# Patient Record
Sex: MALE | Race: WHITE | NOT HISPANIC OR LATINO | Employment: OTHER | URBAN - METROPOLITAN AREA
[De-identification: names, ages, dates, MRNs, and addresses within clinical notes are randomized per-mention and may not be internally consistent; named-entity substitution may affect disease eponyms.]

---

## 2020-10-17 ENCOUNTER — HOSPITAL ENCOUNTER (EMERGENCY)
Facility: HOSPITAL | Age: 65
Discharge: HOME | End: 2020-10-17
Attending: EMERGENCY MEDICINE | Admitting: EMERGENCY MEDICINE
Payer: MEDICARE

## 2020-10-17 VITALS
BODY MASS INDEX: 29.82 KG/M2 | SYSTOLIC BLOOD PRESSURE: 182 MMHG | HEART RATE: 96 BPM | DIASTOLIC BLOOD PRESSURE: 94 MMHG | HEIGHT: 67 IN | RESPIRATION RATE: 14 BRPM | OXYGEN SATURATION: 99 % | WEIGHT: 190 LBS | TEMPERATURE: 97.3 F

## 2020-10-17 DIAGNOSIS — R45.851 SUICIDAL IDEATION: ICD-10-CM

## 2020-10-17 DIAGNOSIS — Z72.89 ALCOHOL USE: ICD-10-CM

## 2020-10-17 DIAGNOSIS — F10.929 ACUTE ALCOHOL INTOXICATION (HCC): Primary | ICD-10-CM

## 2020-10-17 LAB
ALBUMIN SERPL BCP-MCNC: 3.5 G/DL (ref 3.5–5)
ALP SERPL-CCNC: 76 U/L (ref 46–116)
ALT SERPL W P-5'-P-CCNC: 29 U/L (ref 12–78)
AMPHETAMINES SERPL QL SCN: NEGATIVE
ANION GAP SERPL CALCULATED.3IONS-SCNC: 7 MMOL/L (ref 4–13)
AST SERPL W P-5'-P-CCNC: 26 U/L (ref 5–45)
ATRIAL RATE: 83 BPM
BARBITURATES UR QL: NEGATIVE
BASOPHILS # BLD AUTO: 0.05 THOUSANDS/ΜL (ref 0–0.1)
BASOPHILS NFR BLD AUTO: 1 % (ref 0–1)
BENZODIAZ UR QL: NEGATIVE
BILIRUB SERPL-MCNC: 0.6 MG/DL (ref 0.2–1)
BUN SERPL-MCNC: 12 MG/DL (ref 5–25)
CALCIUM SERPL-MCNC: 8.1 MG/DL (ref 8.3–10.1)
CHLORIDE SERPL-SCNC: 109 MMOL/L (ref 100–108)
CLARITY, POC: CLEAR
CO2 SERPL-SCNC: 27 MMOL/L (ref 21–32)
COCAINE UR QL: NEGATIVE
COLOR, POC: YELLOW
CREAT SERPL-MCNC: 1.11 MG/DL (ref 0.6–1.3)
EOSINOPHIL # BLD AUTO: 0.6 THOUSAND/ΜL (ref 0–0.61)
EOSINOPHIL NFR BLD AUTO: 8 % (ref 0–6)
ERYTHROCYTE [DISTWIDTH] IN BLOOD BY AUTOMATED COUNT: 14.5 % (ref 11.6–15.1)
ETHANOL EXG-MCNC: 0.07 MG/DL
ETHANOL EXG-MCNC: 0.08 MG/DL
ETHANOL EXG-MCNC: 0.11 MG/DL
ETHANOL EXG-MCNC: 0.12 MG/DL
ETHANOL EXG-MCNC: 0.22 MG/DL
ETHANOL EXG-MCNC: 0.24 MG/DL
EXT BILIRUBIN, UA: NEGATIVE
EXT BLOOD URINE: NEGATIVE
EXT GLUCOSE, UA: NEGATIVE
EXT KETONES: NEGATIVE
EXT NITRITE, UA: NEGATIVE
EXT PH, UA: 6
EXT PROTEIN, UA: NEGATIVE
EXT SPECIFIC GRAVITY, UA: 1.01
EXT UROBILINOGEN: 0.2
GFR SERPL CREATININE-BSD FRML MDRD: 70 ML/MIN/1.73SQ M
GLUCOSE SERPL-MCNC: 102 MG/DL (ref 65–140)
HCT VFR BLD AUTO: 34.9 % (ref 36.5–49.3)
HGB BLD-MCNC: 11.7 G/DL (ref 12–17)
IMM GRANULOCYTES # BLD AUTO: 0.01 THOUSAND/UL (ref 0–0.2)
IMM GRANULOCYTES NFR BLD AUTO: 0 % (ref 0–2)
LYMPHOCYTES # BLD AUTO: 4.48 THOUSANDS/ΜL (ref 0.6–4.47)
LYMPHOCYTES NFR BLD AUTO: 57 % (ref 14–44)
MAGNESIUM SERPL-MCNC: 1.8 MG/DL (ref 1.6–2.6)
MCH RBC QN AUTO: 33.1 PG (ref 26.8–34.3)
MCHC RBC AUTO-ENTMCNC: 33.5 G/DL (ref 31.4–37.4)
MCV RBC AUTO: 99 FL (ref 82–98)
METHADONE UR QL: NEGATIVE
MONOCYTES # BLD AUTO: 0.77 THOUSAND/ΜL (ref 0.17–1.22)
MONOCYTES NFR BLD AUTO: 10 % (ref 4–12)
NEUTROPHILS # BLD AUTO: 1.83 THOUSANDS/ΜL (ref 1.85–7.62)
NEUTS SEG NFR BLD AUTO: 24 % (ref 43–75)
NRBC BLD AUTO-RTO: 0 /100 WBCS
OPIATES UR QL SCN: NEGATIVE
OXYCODONE+OXYMORPHONE UR QL SCN: NEGATIVE
P AXIS: 54 DEGREES
PCP UR QL: NEGATIVE
PLATELET # BLD AUTO: 309 THOUSANDS/UL (ref 149–390)
PMV BLD AUTO: 9.8 FL (ref 8.9–12.7)
POTASSIUM SERPL-SCNC: 3.5 MMOL/L (ref 3.5–5.3)
PR INTERVAL: 170 MS
PROT SERPL-MCNC: 7.4 G/DL (ref 6.4–8.2)
QRS AXIS: 12 DEGREES
QRSD INTERVAL: 74 MS
QT INTERVAL: 388 MS
QTC INTERVAL: 455 MS
RBC # BLD AUTO: 3.53 MILLION/UL (ref 3.88–5.62)
SARS-COV-2 RNA RESP QL NAA+PROBE: NEGATIVE
SODIUM SERPL-SCNC: 143 MMOL/L (ref 136–145)
T WAVE AXIS: 48 DEGREES
THC UR QL: NEGATIVE
TSH SERPL DL<=0.05 MIU/L-ACNC: 1.66 UIU/ML (ref 0.36–3.74)
VENTRICULAR RATE: 83 BPM
WBC # BLD AUTO: 7.74 THOUSAND/UL (ref 4.31–10.16)
WBC # BLD EST: NEGATIVE 10*3/UL

## 2020-10-17 PROCEDURE — 85025 COMPLETE CBC W/AUTO DIFF WBC: CPT

## 2020-10-17 PROCEDURE — 87635 SARS-COV-2 COVID-19 AMP PRB: CPT | Performed by: EMERGENCY MEDICINE

## 2020-10-17 PROCEDURE — 93005 ELECTROCARDIOGRAM TRACING: CPT

## 2020-10-17 PROCEDURE — 80307 DRUG TEST PRSMV CHEM ANLYZR: CPT

## 2020-10-17 PROCEDURE — 82075 ASSAY OF BREATH ETHANOL: CPT | Performed by: EMERGENCY MEDICINE

## 2020-10-17 PROCEDURE — 36415 COLL VENOUS BLD VENIPUNCTURE: CPT

## 2020-10-17 PROCEDURE — 99285 EMERGENCY DEPT VISIT HI MDM: CPT | Performed by: EMERGENCY MEDICINE

## 2020-10-17 PROCEDURE — 96361 HYDRATE IV INFUSION ADD-ON: CPT

## 2020-10-17 PROCEDURE — 93010 ELECTROCARDIOGRAM REPORT: CPT | Performed by: INTERNAL MEDICINE

## 2020-10-17 PROCEDURE — 81002 URINALYSIS NONAUTO W/O SCOPE: CPT | Performed by: EMERGENCY MEDICINE

## 2020-10-17 PROCEDURE — 82075 ASSAY OF BREATH ETHANOL: CPT

## 2020-10-17 PROCEDURE — 96360 HYDRATION IV INFUSION INIT: CPT

## 2020-10-17 PROCEDURE — 99285 EMERGENCY DEPT VISIT HI MDM: CPT

## 2020-10-17 PROCEDURE — 80053 COMPREHEN METABOLIC PANEL: CPT

## 2020-10-17 PROCEDURE — 84443 ASSAY THYROID STIM HORMONE: CPT | Performed by: EMERGENCY MEDICINE

## 2020-10-17 PROCEDURE — 83735 ASSAY OF MAGNESIUM: CPT | Performed by: EMERGENCY MEDICINE

## 2020-10-17 RX ORDER — FOLIC ACID 1 MG/1
1 TABLET ORAL DAILY
Status: DISCONTINUED | OUTPATIENT
Start: 2020-10-17 | End: 2020-10-17 | Stop reason: HOSPADM

## 2020-10-17 RX ORDER — THIAMINE MONONITRATE (VIT B1) 100 MG
100 TABLET ORAL DAILY
Status: DISCONTINUED | OUTPATIENT
Start: 2020-10-17 | End: 2020-10-17 | Stop reason: HOSPADM

## 2020-10-17 RX ADMIN — THIAMINE HCL TAB 100 MG 100 MG: 100 TAB at 08:03

## 2020-10-17 RX ADMIN — SODIUM CHLORIDE 1000 ML: 0.9 INJECTION, SOLUTION INTRAVENOUS at 02:30

## 2020-10-17 RX ADMIN — FOLIC ACID 1 MG: 1 TABLET ORAL at 08:03

## 2021-10-29 ENCOUNTER — HOSPITAL ENCOUNTER (EMERGENCY)
Facility: HOSPITAL | Age: 66
Discharge: HOME | End: 2021-10-29
Attending: EMERGENCY MEDICINE
Payer: MEDICARE

## 2021-10-29 VITALS
SYSTOLIC BLOOD PRESSURE: 147 MMHG | OXYGEN SATURATION: 94 % | RESPIRATION RATE: 17 BRPM | TEMPERATURE: 98.2 F | DIASTOLIC BLOOD PRESSURE: 81 MMHG | HEART RATE: 71 BPM

## 2021-10-29 DIAGNOSIS — F10.10 ALCOHOL ABUSE: ICD-10-CM

## 2021-10-29 DIAGNOSIS — K60.2 ANAL FISSURE: Primary | ICD-10-CM

## 2021-10-29 LAB
ETHANOL EXG-MCNC: 0.08 MG/DL
ETHANOL EXG-MCNC: 0.11 MG/DL
ETHANOL EXG-MCNC: NORMAL MG/DL

## 2021-10-29 PROCEDURE — 82272 OCCULT BLD FECES 1-3 TESTS: CPT

## 2021-10-29 PROCEDURE — 82075 ASSAY OF BREATH ETHANOL: CPT | Performed by: EMERGENCY MEDICINE

## 2021-10-29 PROCEDURE — 99285 EMERGENCY DEPT VISIT HI MDM: CPT

## 2021-10-29 PROCEDURE — 99282 EMERGENCY DEPT VISIT SF MDM: CPT | Performed by: EMERGENCY MEDICINE

## 2021-10-29 RX ORDER — BUPROPION HYDROCHLORIDE 150 MG/1
TABLET ORAL
COMMUNITY
End: 2022-07-26

## 2021-10-29 RX ORDER — DOCUSATE SODIUM 100 MG/1
100 CAPSULE, LIQUID FILLED ORAL EVERY 12 HOURS
Qty: 60 CAPSULE | Refills: 0 | Status: SHIPPED | OUTPATIENT
Start: 2021-10-29

## 2021-10-29 RX ORDER — FOLIC ACID 1 MG/1
1 TABLET ORAL DAILY
Status: DISCONTINUED | OUTPATIENT
Start: 2021-10-29 | End: 2021-10-29 | Stop reason: HOSPADM

## 2021-10-29 RX ORDER — LANOLIN ALCOHOL/MO/W.PET/CERES
100 CREAM (GRAM) TOPICAL DAILY
Status: DISCONTINUED | OUTPATIENT
Start: 2021-10-29 | End: 2021-10-29 | Stop reason: HOSPADM

## 2021-10-29 RX ORDER — MULTIVITAMIN/IRON/FOLIC ACID 18MG-0.4MG
1 TABLET ORAL DAILY
Status: DISCONTINUED | OUTPATIENT
Start: 2021-10-29 | End: 2021-10-29 | Stop reason: HOSPADM

## 2021-10-29 RX ORDER — PANTOPRAZOLE SODIUM 40 MG/1
TABLET, DELAYED RELEASE ORAL
COMMUNITY

## 2021-10-29 RX ORDER — CITALOPRAM 20 MG/1
TABLET ORAL
COMMUNITY
End: 2022-07-26

## 2021-10-29 RX ORDER — AMLODIPINE BESYLATE 10 MG/1
TABLET ORAL
COMMUNITY

## 2021-10-29 RX ORDER — AMITRIPTYLINE HYDROCHLORIDE 50 MG/1
TABLET, FILM COATED ORAL
COMMUNITY
End: 2022-07-26

## 2021-10-29 RX ADMIN — THIAMINE HCL TAB 100 MG 100 MG: 100 TAB at 08:04

## 2021-10-29 RX ADMIN — MULTIPLE VITAMINS W/ MINERALS TAB 1 TABLET: TAB ORAL at 08:04

## 2021-10-29 RX ADMIN — FOLIC ACID 1 MG: 1 TABLET ORAL at 08:04

## 2022-04-20 ENCOUNTER — HOSPITAL ENCOUNTER (EMERGENCY)
Facility: HOSPITAL | Age: 67
Discharge: HOME | End: 2022-04-20
Attending: EMERGENCY MEDICINE | Admitting: EMERGENCY MEDICINE
Payer: MEDICARE

## 2022-04-20 ENCOUNTER — APPOINTMENT (EMERGENCY)
Dept: CT IMAGING | Facility: HOSPITAL | Age: 67
End: 2022-04-20
Payer: MEDICARE

## 2022-04-20 VITALS
SYSTOLIC BLOOD PRESSURE: 208 MMHG | TEMPERATURE: 97.5 F | HEART RATE: 97 BPM | OXYGEN SATURATION: 99 % | DIASTOLIC BLOOD PRESSURE: 111 MMHG | RESPIRATION RATE: 18 BRPM

## 2022-04-20 DIAGNOSIS — Z91.14 NONCOMPLIANCE WITH MEDICATIONS: ICD-10-CM

## 2022-04-20 DIAGNOSIS — I10 POORLY-CONTROLLED HYPERTENSION: ICD-10-CM

## 2022-04-20 DIAGNOSIS — R10.9 ABDOMINAL PAIN: Primary | ICD-10-CM

## 2022-04-20 DIAGNOSIS — K29.70 GASTRITIS: ICD-10-CM

## 2022-04-20 LAB
ALBUMIN SERPL BCP-MCNC: 4 G/DL (ref 3.5–5)
ALP SERPL-CCNC: 106 U/L (ref 46–116)
ALT SERPL W P-5'-P-CCNC: 41 U/L (ref 12–78)
ANION GAP SERPL CALCULATED.3IONS-SCNC: 12 MMOL/L (ref 4–13)
AST SERPL W P-5'-P-CCNC: 37 U/L (ref 5–45)
BASOPHILS # BLD AUTO: 0.02 THOUSANDS/ΜL (ref 0–0.1)
BASOPHILS NFR BLD AUTO: 0 % (ref 0–1)
BILIRUB SERPL-MCNC: 2 MG/DL (ref 0.2–1)
BUN SERPL-MCNC: 15 MG/DL (ref 5–25)
CALCIUM SERPL-MCNC: 9 MG/DL (ref 8.3–10.1)
CHLORIDE SERPL-SCNC: 101 MMOL/L (ref 100–108)
CO2 SERPL-SCNC: 23 MMOL/L (ref 21–32)
CREAT SERPL-MCNC: 1.13 MG/DL (ref 0.6–1.3)
EOSINOPHIL # BLD AUTO: 0.14 THOUSAND/ΜL (ref 0–0.61)
EOSINOPHIL NFR BLD AUTO: 3 % (ref 0–6)
ERYTHROCYTE [DISTWIDTH] IN BLOOD BY AUTOMATED COUNT: 13.3 % (ref 11.6–15.1)
GFR SERPL CREATININE-BSD FRML MDRD: 67 ML/MIN/1.73SQ M
GLUCOSE SERPL-MCNC: 107 MG/DL (ref 65–140)
HCT VFR BLD AUTO: 44.9 % (ref 36.5–49.3)
HGB BLD-MCNC: 15.6 G/DL (ref 12–17)
IMM GRANULOCYTES # BLD AUTO: 0.02 THOUSAND/UL (ref 0–0.2)
IMM GRANULOCYTES NFR BLD AUTO: 0 % (ref 0–2)
LIPASE SERPL-CCNC: 75 U/L (ref 73–393)
LYMPHOCYTES # BLD AUTO: 1.59 THOUSANDS/ΜL (ref 0.6–4.47)
LYMPHOCYTES NFR BLD AUTO: 33 % (ref 14–44)
MCH RBC QN AUTO: 32.8 PG (ref 26.8–34.3)
MCHC RBC AUTO-ENTMCNC: 34.7 G/DL (ref 31.4–37.4)
MCV RBC AUTO: 95 FL (ref 82–98)
MONOCYTES # BLD AUTO: 0.37 THOUSAND/ΜL (ref 0.17–1.22)
MONOCYTES NFR BLD AUTO: 8 % (ref 4–12)
NEUTROPHILS # BLD AUTO: 2.75 THOUSANDS/ΜL (ref 1.85–7.62)
NEUTS SEG NFR BLD AUTO: 56 % (ref 43–75)
NRBC BLD AUTO-RTO: 0 /100 WBCS
PLATELET # BLD AUTO: 255 THOUSANDS/UL (ref 149–390)
PMV BLD AUTO: 10 FL (ref 8.9–12.7)
POTASSIUM SERPL-SCNC: 3.6 MMOL/L (ref 3.5–5.3)
PROT SERPL-MCNC: 8 G/DL (ref 6.4–8.2)
RBC # BLD AUTO: 4.75 MILLION/UL (ref 3.88–5.62)
SODIUM SERPL-SCNC: 136 MMOL/L (ref 136–145)
WBC # BLD AUTO: 4.89 THOUSAND/UL (ref 4.31–10.16)

## 2022-04-20 PROCEDURE — 99284 EMERGENCY DEPT VISIT MOD MDM: CPT

## 2022-04-20 PROCEDURE — 74177 CT ABD & PELVIS W/CONTRAST: CPT

## 2022-04-20 PROCEDURE — 83690 ASSAY OF LIPASE: CPT | Performed by: EMERGENCY MEDICINE

## 2022-04-20 PROCEDURE — G1004 CDSM NDSC: HCPCS

## 2022-04-20 PROCEDURE — 36415 COLL VENOUS BLD VENIPUNCTURE: CPT | Performed by: EMERGENCY MEDICINE

## 2022-04-20 PROCEDURE — 96375 TX/PRO/DX INJ NEW DRUG ADDON: CPT

## 2022-04-20 PROCEDURE — 85025 COMPLETE CBC W/AUTO DIFF WBC: CPT | Performed by: EMERGENCY MEDICINE

## 2022-04-20 PROCEDURE — 93005 ELECTROCARDIOGRAM TRACING: CPT

## 2022-04-20 PROCEDURE — 99285 EMERGENCY DEPT VISIT HI MDM: CPT | Performed by: EMERGENCY MEDICINE

## 2022-04-20 PROCEDURE — 96365 THER/PROPH/DIAG IV INF INIT: CPT

## 2022-04-20 PROCEDURE — 80053 COMPREHEN METABOLIC PANEL: CPT | Performed by: EMERGENCY MEDICINE

## 2022-04-20 PROCEDURE — 96366 THER/PROPH/DIAG IV INF ADDON: CPT

## 2022-04-20 PROCEDURE — C9113 INJ PANTOPRAZOLE SODIUM, VIA: HCPCS | Performed by: EMERGENCY MEDICINE

## 2022-04-20 RX ORDER — PANTOPRAZOLE SODIUM 40 MG/1
40 INJECTION, POWDER, FOR SOLUTION INTRAVENOUS ONCE
Status: COMPLETED | OUTPATIENT
Start: 2022-04-20 | End: 2022-04-20

## 2022-04-20 RX ORDER — ONDANSETRON 2 MG/ML
4 INJECTION INTRAMUSCULAR; INTRAVENOUS ONCE
Status: COMPLETED | OUTPATIENT
Start: 2022-04-20 | End: 2022-04-20

## 2022-04-20 RX ORDER — AMLODIPINE BESYLATE 5 MG/1
10 TABLET ORAL ONCE
Status: COMPLETED | OUTPATIENT
Start: 2022-04-20 | End: 2022-04-20

## 2022-04-20 RX ADMIN — IOHEXOL 100 ML: 350 INJECTION, SOLUTION INTRAVENOUS at 16:19

## 2022-04-20 RX ADMIN — PANTOPRAZOLE SODIUM 40 MG: 40 INJECTION, POWDER, FOR SOLUTION INTRAVENOUS at 15:05

## 2022-04-20 RX ADMIN — ONDANSETRON 4 MG: 2 INJECTION INTRAMUSCULAR; INTRAVENOUS at 15:05

## 2022-04-20 RX ADMIN — AMLODIPINE BESYLATE 10 MG: 5 TABLET ORAL at 15:06

## 2022-04-20 RX ADMIN — SODIUM CHLORIDE, SODIUM LACTATE, POTASSIUM CHLORIDE, AND CALCIUM CHLORIDE 1000 ML: .6; .31; .03; .02 INJECTION, SOLUTION INTRAVENOUS at 15:06

## 2022-04-20 NOTE — ED PROVIDER NOTES
History  Chief Complaint   Patient presents with    Abdominal Pain     Pt reports abdominal pain on left side, startred Matti morning  Pt reports vomiting "once or twice" on Matti morning, with no vomiting since then  Pt reports diarrhea since Sunday  Pt reports being an alcoholic, drinking "1/2 of a fifth of gin per day " Pt reports last drink was on Sunday night  Pt reports not eating or drinking since Sunday  Pt reports not taking prescribed medications since Saturday  73year old male brought by EMS for evaluation of dull left upper quadrant abdominal pain which has been constant for the past 4 days  Patient states he has not had anything to eat or drink since symptoms began  He also has not taken any of his prescribed medications including his antihypertensive  Patient reports that he drinks 1/2 of a fifth of gin daily with the last drink just before the onset of pain  He states he has had similar episodes in the past and typically stops eating and drinking with improvement in symptoms  Patient reports history of prior hospitalizations for alcohol withdrawal        History provided by:  Patient  Abdominal Pain  Pain location:  LUQ  Pain quality: aching    Pain radiates to:  Does not radiate  Pain severity:  Moderate  Onset quality:  Sudden  Duration:  4 days  Timing:  Constant  Progression:  Worsening  Chronicity:  Recurrent  Context: alcohol use    Relieved by:  Nothing  Worsened by:  Nothing  Ineffective treatments: not eating or drinking  Associated symptoms: diarrhea and nausea    Associated symptoms: no chest pain, no chills, no cough, no fever and no shortness of breath    Risk factors: alcohol abuse        Prior to Admission Medications   Prescriptions Last Dose Informant Patient Reported? Taking?    amLODIPine (NORVASC) 10 mg tablet   Yes No   amitriptyline (ELAVIL) 50 mg tablet   Yes No   buPROPion (WELLBUTRIN XL) 150 mg 24 hr tablet   Yes No   citalopram (CeleXA) 20 mg tablet   Yes No docusate sodium (COLACE) 100 mg capsule   No No   Sig: Take 1 capsule (100 mg total) by mouth every 12 (twelve) hours   pantoprazole (PROTONIX) 40 mg tablet   Yes No      Facility-Administered Medications: None       Past Medical History:   Diagnosis Date    Alcohol abuse     Hypertension        Past Surgical History:   Procedure Laterality Date    UMBILICAL HERNIA REPAIR         History reviewed  No pertinent family history  I have reviewed and agree with the history as documented  E-Cigarette/Vaping    E-Cigarette Use Never User      E-Cigarette/Vaping Substances    Nicotine No     THC No     CBD No     Flavoring No     Other No     Unknown No      Social History     Tobacco Use    Smoking status: Never Smoker    Smokeless tobacco: Never Used   Vaping Use    Vaping Use: Never used   Substance Use Topics    Alcohol use: Yes    Drug use: Never       Review of Systems   Constitutional: Positive for appetite change  Negative for chills and fever  HENT: Negative for congestion  Respiratory: Negative for cough and shortness of breath  Cardiovascular: Negative for chest pain  Gastrointestinal: Positive for abdominal pain, diarrhea and nausea  All other systems reviewed and are negative  Physical Exam  Physical Exam  Vitals and nursing note reviewed  Constitutional:       General: He is not in acute distress  Appearance: He is well-developed  He is not toxic-appearing or diaphoretic  HENT:      Head: Normocephalic and atraumatic  Right Ear: External ear normal       Left Ear: External ear normal       Nose: Nose normal    Eyes:      General: No scleral icterus  Cardiovascular:      Rate and Rhythm: Normal rate and regular rhythm  Pulses: Normal pulses  Pulmonary:      Effort: Pulmonary effort is normal  No respiratory distress  Abdominal:      General: There is no distension  Tenderness: There is abdominal tenderness in the left upper quadrant  Musculoskeletal:         General: No deformity  Normal range of motion  Skin:     Findings: No rash  Neurological:      General: No focal deficit present  Mental Status: He is alert        Gait: Gait normal    Psychiatric:         Mood and Affect: Mood normal          Vital Signs  ED Triage Vitals   Temperature Pulse Respirations Blood Pressure SpO2   04/20/22 1439 04/20/22 1439 04/20/22 1439 04/20/22 1439 04/20/22 1439   97 5 °F (36 4 °C) 97 18 (!) 221/118 99 %      Temp Source Heart Rate Source Patient Position - Orthostatic VS BP Location FiO2 (%)   04/20/22 1439 04/20/22 1439 04/20/22 1439 04/20/22 1439 --   Tympanic Monitor Sitting Left arm       Pain Score       04/20/22 1443       5           Vitals:    04/20/22 1439 04/20/22 1506 04/20/22 1654   BP: (!) 221/118 (!) 221/118 (!) 208/111   Pulse: 97     Patient Position - Orthostatic VS: Sitting           Visual Acuity      ED Medications  Medications   lactated ringers bolus 1,000 mL (1,000 mL Intravenous New Bag 4/20/22 1506)   ondansetron (ZOFRAN) injection 4 mg (4 mg Intravenous Given 4/20/22 1505)   amLODIPine (NORVASC) tablet 10 mg (10 mg Oral Given 4/20/22 1506)   pantoprazole (PROTONIX) injection 40 mg (40 mg Intravenous Given 4/20/22 1505)   iohexol (OMNIPAQUE) 350 MG/ML injection (SINGLE-DOSE) 100 mL (100 mL Intravenous Given 4/20/22 1619)       Diagnostic Studies  Results Reviewed     Procedure Component Value Units Date/Time    Comprehensive metabolic panel [[id number]]  (Abnormal) Collected: 04/20/22 1507    Lab Status: Final result Specimen: Blood from Arm, Left Updated: 04/20/22 1538     Sodium 136 mmol/L      Potassium 3 6 mmol/L      Chloride 101 mmol/L      CO2 23 mmol/L      ANION GAP 12 mmol/L      BUN 15 mg/dL      Creatinine 1 13 mg/dL      Glucose 107 mg/dL      Calcium 9 0 mg/dL      AST 37 U/L      ALT 41 U/L      Alkaline Phosphatase 106 U/L      Total Protein 8 0 g/dL      Albumin 4 0 g/dL      Total Bilirubin 2 00 mg/dL eGFR 67 ml/min/1 73sq m     Narrative:      [place] guidelines for Chronic Kidney Disease (CKD):     Stage 1 with normal or high GFR (GFR > 90 mL/min/1 73 square meters)    Stage 2 Mild CKD (GFR = 60-89 mL/min/1 73 square meters)    Stage 3A Moderate CKD (GFR = 45-59 mL/min/1 73 square meters)    Stage 3B Moderate CKD (GFR = 30-44 mL/min/1 73 square meters)    Stage 4 Severe CKD (GFR = 15-29 mL/min/1 73 square meters)    Stage 5 End Stage CKD (GFR <15 mL/min/1 73 square meters)  Note: GFR calculation is accurate only with a steady state creatinine    Lipase [[id number]]  (Normal) Collected: 04/20/22 1507    Lab Status: Final result Specimen: Blood from Arm, Left Updated: 04/20/22 1538     Lipase 75 u/L     CBC and differential [[id number]] Collected: 04/20/22 1507    Lab Status: Final result Specimen: Blood from Arm, Left Updated: 04/20/22 1515     WBC 4 89 Thousand/uL      RBC 4 75 Million/uL      Hemoglobin 15 6 g/dL      Hematocrit 44 9 %      MCV 95 fL      MCH 32 8 pg      MCHC 34 7 g/dL      RDW 13 3 %      MPV 10 0 fL      Platelets 998 Thousands/uL      nRBC 0 /100 WBCs      Neutrophils Relative 56 %      Immat GRANS % 0 %      Lymphocytes Relative 33 %      Monocytes Relative 8 %      Eosinophils Relative 3 %      Basophils Relative 0 %      Neutrophils Absolute 2 75 Thousands/µL      Immature Grans Absolute 0 02 Thousand/uL      Lymphocytes Absolute 1 59 Thousands/µL      Monocytes Absolute 0 37 Thousand/µL      Eosinophils Absolute 0 14 Thousand/µL      Basophils Absolute 0 02 Thousands/µL                  CT abdomen pelvis with contrast   Final Result by Jayesh Polo MD (04/20 1646)      No acute abnormality in the abdomen or pelvis        Workstation performed: [id number]                    Procedures  ECG 12 Lead Documentation Only    Date/Time: 4/20/2022 2:51 PM  Performed by: Tracey Mcmahon MD  Authorized by: Tracey Mcmahon MD     Indications / Diagnosis: Abdominal pain  ECG reviewed by me, the ED Provider: yes    Patient location:  ED  Previous ECG:     Previous ECG:  Compared to current    Comparison ECG info:  10/17/20 normal sinus rhythm with twave flattening III, aVL, V2, V3    Similarity:  No change  Interpretation:     Interpretation: non-specific    Rate:     ECG rate:  84    ECG rate assessment: normal    Rhythm:     Rhythm: sinus rhythm    Ectopy:     Ectopy: none    QRS:     QRS axis:  Normal    QRS intervals:  Normal  Conduction:     Conduction: normal    ST segments:     ST segments:  Normal  T waves:     T waves: flattening      Flattening:  V2             ED Course  ED Course as of 04/20/22 1656   Wed Apr 20, 2022   1549 TOTAL BILIRUBIN(!): 2 00  0 6 one year ago                                             MDM  Number of Diagnoses or Management Options  Abdominal pain: new and requires workup  Gastritis: new and requires workup  Noncompliance with medications: new and requires workup  Poorly-controlled hypertension: new and requires workup  Diagnosis management comments: 73year old male presents for evaluation of LUQ abdominal pain associated with alcohol abuse  Labs unremarkable with the exception of isolated elevation of bilirubin  Blood pressure improving after restarting patient's home antihypertensive  CT abd/pelvis unremarkable  Symptoms improved after IV protonix  Patient advised to take his medications, which include pantoprazole, as prescribed  Return precautions provided         Amount and/or Complexity of Data Reviewed  Clinical lab tests: ordered and reviewed  Tests in the radiology section of CPT®: ordered and reviewed    Patient Progress  Patient progress: stable      Disposition  Final diagnoses:   Abdominal pain   Poorly-controlled hypertension   Noncompliance with medications   Gastritis     Time reflects when diagnosis was documented in both MDM as applicable and the Disposition within this note     Time User Action Codes Description Comment    4/20/2022  4:06 PM Neelam Bettencourt Parkinson Add [R10 9] Abdominal pain     4/20/2022  4:06 PM Cynthia Blades Add [I10] Poorly-controlled hypertension     4/20/2022  4:06 PM Cynthia Blades Add [Z91 14] Noncompliance with medications     4/20/2022  4:49 PM Cynthia Blades Add [K29 70] Gastritis       ED Disposition     ED Disposition Condition Date/Time Comment    Discharge Stable Wed Apr 20, 2022  4:54  [facility] discharge to home/self care  Follow-up Information     Follow up With Specialties Details Why Contact Info Additional Information    Brady Long  Schedule an appointment as soon as possible for a visit in 2 days for recheck of your blood pressure [address] Emergency Department Emergency Medicine Go to  If symptoms worsen [address] [place], [address]  [address] [facility] Emergency Department, [address] [facility], [facility] [address]          Patient's Medications   Discharge Prescriptions    No medications on file       No discharge procedures on file      PDMP Review     None          ED Provider  Electronically Signed by           Maribel Chow MD  04/20/22 1470

## 2022-04-22 LAB
ATRIAL RATE: 84 BPM
P AXIS: 65 DEGREES
PR INTERVAL: 166 MS
QRS AXIS: -22 DEGREES
QRSD INTERVAL: 68 MS
QT INTERVAL: 378 MS
QTC INTERVAL: 446 MS
T WAVE AXIS: 64 DEGREES
VENTRICULAR RATE: 84 BPM

## 2022-04-22 PROCEDURE — 93010 ELECTROCARDIOGRAM REPORT: CPT | Performed by: INTERNAL MEDICINE

## 2022-05-20 ENCOUNTER — TELEPHONE (OUTPATIENT)
Dept: GASTROENTEROLOGY | Facility: CLINIC | Age: 67
End: 2022-05-20

## 2022-05-20 NOTE — TELEPHONE ENCOUNTER
Message from [facility] @ [facility] ph: [phone]   - pt seen recently for abd pain in ED  Needs office visit   Thank you

## 2022-06-03 ENCOUNTER — OFFICE VISIT (OUTPATIENT)
Dept: GASTROENTEROLOGY | Facility: CLINIC | Age: 67
End: 2022-06-03
Payer: MEDICARE

## 2022-06-03 VITALS
BODY MASS INDEX: 29.67 KG/M2 | WEIGHT: 195.8 LBS | SYSTOLIC BLOOD PRESSURE: 166 MMHG | DIASTOLIC BLOOD PRESSURE: 110 MMHG | HEIGHT: 68 IN

## 2022-06-03 DIAGNOSIS — E80.6 HYPERBILIRUBINEMIA: ICD-10-CM

## 2022-06-03 DIAGNOSIS — K62.5 RECTAL BLEEDING: Primary | ICD-10-CM

## 2022-06-03 DIAGNOSIS — F10.10 ALCOHOL ABUSE: ICD-10-CM

## 2022-06-03 DIAGNOSIS — Z12.11 SCREENING FOR COLON CANCER: ICD-10-CM

## 2022-06-03 PROCEDURE — 99204 OFFICE O/P NEW MOD 45 MIN: CPT | Performed by: NURSE PRACTITIONER

## 2022-06-03 RX ORDER — POLYETHYLENE GLYCOL 3350, SODIUM SULFATE ANHYDROUS, SODIUM BICARBONATE, SODIUM CHLORIDE, POTASSIUM CHLORIDE 236; 22.74; 6.74; 5.86; 2.97 G/4L; G/4L; G/4L; G/4L; G/4L
4000 POWDER, FOR SOLUTION ORAL ONCE
Qty: 4000 ML | Refills: 0 | OUTPATIENT
Start: 2022-06-03 | End: 2022-07-26

## 2022-06-03 NOTE — PROGRESS NOTES
[id number] - Outpatient Consultation  Piper May 73 y o  male MRN: [id number]  Encounter: [id number]    ASSESSMENT AND PLAN:      1  Rectal bleeding  Patient states he is having increased rectal bleeding over the past 1-2 months  Patient states he is noticing more frequency on the toilet tissue  He denies constipation  States he believes he has a history of hemorrhoids  Hemoglobin 15 6, platelets 236 with recent lab work  Consider hemorrhoid, anal fissure, microscopic colitis, diverticular disease     - Colonoscopy scheduled at [facility])  - polyethylene glycol (Golytely) 4000 mL solution; Take 4,000 mL by mouth once for 1 dose Take 4000 mL by mouth once for 1 dose  Use as directed  Dispense: 4000 mL; Refill: 0    2  Hyperbilirubinemia  Annual blood work with [facility] was normal with the exception of T bili at 2 0  Patient is unaware of any historical elevated liver enzymes  With patient's history of alcohol abuse, will repeat basic lab and right upper quadrant ultrasound  Consider primary biliary cholangitis, hepatic injury, less likely Gilbert's  - Comprehensive metabolic panel  - Fe+TIBC+Matthew  - Direct bilirubin  - US right upper quadrant; Future    3  Alcohol abuse  Patient states he has been drinking a gal of gin for approximately 20-25 years  He did attend and rehab for a month last year and was sober for approximately a month thereafter however states he started back up again  4  Screening for colon cancer  Patient states he has had a colonoscopy in the past but is unsure when and where or results  Requesting medical records from his PCP who he believes has information      - colonoscopy ordered at [facility])    Followup Appointment:  2-3 months after colonoscopy  ______________________________________________________________________    Chief Complaint   Patient presents with    Colonoscopy    Abdominal Pain    Constipation    Rectal Bleeding    Diarrhea HPI:   Anayeli Del Angel is a 73y o  year old male with history of hypertension and alcohol abuse presents with complaint rectal bleeding for approximately the last 2 months  Patient also notes that he consumes approximately a gal of gin a week he states for the past 20-25 years  He denies nausea however states he does have some vomiting with excessive drinking  Denies any acid reflux symptoms while taking pantoprazole 40 mg daily  States he has occasional dull abdominal pain that comes and goes is not dependent on food intake  Suspect sick could be also due to his drinking  Patient states he is having 1-2 soft to normal bowel movements per day  States he does have hematochezia however notes he was told he has a history of hemorrhoids  He does state there has been increased blood on the tissue when wiping in last 1-2 months  He denies any melena  Recent lab work 04/20/2022; lipase normal, CBC normal, liver enzymes with T bili 2 0  Patient states he had a colonoscopy in the past as remember how long ago but does not remember polyps or hemorrhoids    We are requesting medical records from his PCP to get prior medical information    Historical Information   Past Medical History:   Diagnosis Date    Alcohol abuse     Hypertension      Past Surgical History:   Procedure Laterality Date    UMBILICAL HERNIA REPAIR       Social History     Substance and Sexual Activity   Alcohol Use Yes    Comment: a gallon of gin a week     Social History     Substance and Sexual Activity   Drug Use Never     Social History     Tobacco Use   Smoking Status Never Smoker   Smokeless Tobacco Never Used     Family History   Problem Relation Age of Onset    Colon cancer Neg Hx     Colon polyps Neg Hx        Meds/Allergies     Current Outpatient Medications:     amitriptyline (ELAVIL) 50 mg tablet    amLODIPine (NORVASC) 10 mg tablet    buPROPion (WELLBUTRIN XL) 150 mg 24 hr tablet    citalopram (CeleXA) 20 mg tablet   docusate sodium (COLACE) 100 mg capsule    pantoprazole (PROTONIX) 40 mg tablet    polyethylene glycol (Golytely) 4000 mL solution    No Known Allergies    PHYSICAL EXAM:    Blood pressure (!) 166/110, height 5' 8" (1 727 m), weight 88 8 kg (195 lb 12 8 oz)  Body mass index is 29 77 kg/m²  Normal exam    General Appearance: NAD, cooperative, alert  Eyes: Anicteric, PERRLA, EOMI  ENT:  Normocephalic, atraumatic, normal mucosa  Neck:  Supple, symmetrical, trachea midline,   Resp:  Clear to auscultation bilaterally; no rales, rhonchi or wheezing; respirations unlabored   CV:  S1 S2, Regular rate and rhythm; no murmur, rub, or gallop  GI:  Soft, non-tender, non-distended; normal bowel sounds; no masses, no organomegaly   Rectal: Deferred  Musculoskeletal: No cyanosis, clubbing or edema  Normal ROM  Skin:  No jaundice, rashes, or lesions   Heme/Lymph: No palpable cervical lymphadenopathy  Psych: Normal affect, good eye contact  Neuro: No gross deficits, AAOx3    Lab Results:   Lab Results   Component Value Date    WBC 4 89 04/20/2022    HGB 15 6 04/20/2022    HCT 44 9 04/20/2022    MCV 95 04/20/2022     04/20/2022     Lab Results   Component Value Date    K 3 6 04/20/2022     04/20/2022    CO2 23 04/20/2022    BUN 15 04/20/2022    CREATININE 1 13 04/20/2022    CALCIUM 9 0 04/20/2022    AST 37 04/20/2022    ALT 41 04/20/2022    ALKPHOS 106 04/20/2022    EGFR 67 04/20/2022     No results found for: IRON, TIBC, FERRITIN  Lab Results   Component Value Date    LIPASE 75 04/20/2022       Radiology Results:   No results found  REVIEW OF SYSTEMS:    CONSTITUTIONAL: Denies any fever, chills, rigors, and weight loss  HEENT: No earache or tinnitus  Denies hearing loss or visual disturbances  CARDIOVASCULAR: No chest pain or palpitations  RESPIRATORY: Denies any cough, hemoptysis, shortness of breath or dyspnea on exertion  GASTROINTESTINAL: As noted in the History of Present Illness  GENITOURINARY: No problems with urination  Denies any hematuria or dysuria  NEUROLOGIC: No dizziness or vertigo, denies headaches  MUSCULOSKELETAL: Denies any muscle or joint pain  SKIN: Denies skin rashes or itching  ENDOCRINE: Denies excessive thirst  Denies intolerance to heat or cold  PSYCHOSOCIAL: Denies depression or anxiety  Denies any recent memory loss

## 2022-06-03 NOTE — TELEPHONE ENCOUNTER
Scheduled date of colonoscopy (as of today):7/26/2022  Physician performing colonoscopy: Dr Goyo Juárez  Location of colonoscopy:[facility]  Bowel prep reviewed with patient:Colyte  Instructions reviewed with patient by:Nusrat Garcia CMA  Clearances: None

## 2022-07-01 LAB
ALBUMIN SERPL-MCNC: 4.5 G/DL (ref 3.8–4.8)
ALBUMIN/GLOB SERPL: 1.7 {RATIO} (ref 1.2–2.2)
ALP SERPL-CCNC: 92 IU/L (ref 44–121)
ALT SERPL-CCNC: 23 IU/L (ref 0–44)
AST SERPL-CCNC: 23 IU/L (ref 0–40)
BILIRUB DIRECT SERPL-MCNC: 0.39 MG/DL (ref 0–0.4)
BILIRUB SERPL-MCNC: 1.5 MG/DL (ref 0–1.2)
BUN SERPL-MCNC: 12 MG/DL (ref 8–27)
BUN/CREAT SERPL: 10 (ref 10–24)
CALCIUM SERPL-MCNC: 9.5 MG/DL (ref 8.6–10.2)
CHLORIDE SERPL-SCNC: 100 MMOL/L (ref 96–106)
CO2 SERPL-SCNC: 23 MMOL/L (ref 20–29)
CREAT SERPL-MCNC: 1.17 MG/DL (ref 0.76–1.27)
EGFR: 69 ML/MIN/1.73
GLOBULIN SER-MCNC: 2.7 G/DL (ref 1.5–4.5)
GLUCOSE SERPL-MCNC: 111 MG/DL (ref 65–99)
IRON SATN MFR SERPL: 61 % (ref 15–55)
IRON SERPL-MCNC: 248 UG/DL (ref 38–169)
POTASSIUM SERPL-SCNC: 4.7 MMOL/L (ref 3.5–5.2)
PROT SERPL-MCNC: 7.2 G/DL (ref 6–8.5)
SODIUM SERPL-SCNC: 136 MMOL/L (ref 134–144)
TIBC SERPL-MCNC: 409 UG/DL (ref 250–450)
UIBC SERPL-MCNC: 161 UG/DL (ref 111–343)

## 2022-07-22 ENCOUNTER — TELEPHONE (OUTPATIENT)
Dept: GASTROENTEROLOGY | Facility: AMBULATORY SURGERY CENTER | Age: 67
End: 2022-07-22

## 2022-07-26 ENCOUNTER — TELEPHONE (OUTPATIENT)
Dept: GASTROENTEROLOGY | Facility: CLINIC | Age: 67
End: 2022-07-26

## 2022-07-26 ENCOUNTER — ANESTHESIA EVENT (OUTPATIENT)
Dept: GASTROENTEROLOGY | Facility: AMBULATORY SURGERY CENTER | Age: 67
End: 2022-07-26

## 2022-07-26 ENCOUNTER — ANESTHESIA (OUTPATIENT)
Dept: GASTROENTEROLOGY | Facility: AMBULATORY SURGERY CENTER | Age: 67
End: 2022-07-26

## 2022-07-26 ENCOUNTER — HOSPITAL ENCOUNTER (OUTPATIENT)
Dept: GASTROENTEROLOGY | Facility: AMBULATORY SURGERY CENTER | Age: 67
Discharge: HOME | End: 2022-07-26
Payer: MEDICARE

## 2022-07-26 VITALS
HEART RATE: 82 BPM | OXYGEN SATURATION: 98 % | HEIGHT: 68 IN | SYSTOLIC BLOOD PRESSURE: 196 MMHG | DIASTOLIC BLOOD PRESSURE: 91 MMHG | RESPIRATION RATE: 22 BRPM | WEIGHT: 195 LBS | TEMPERATURE: 97.3 F | BODY MASS INDEX: 29.55 KG/M2

## 2022-07-26 DIAGNOSIS — K62.5 RECTAL BLEEDING: ICD-10-CM

## 2022-07-26 PROCEDURE — 45378 DIAGNOSTIC COLONOSCOPY: CPT | Performed by: INTERNAL MEDICINE

## 2022-07-26 RX ORDER — PROPOFOL 10 MG/ML
INJECTION, EMULSION INTRAVENOUS AS NEEDED
Status: DISCONTINUED | OUTPATIENT
Start: 2022-07-26 | End: 2022-07-26

## 2022-07-26 RX ORDER — SODIUM CHLORIDE, SODIUM LACTATE, POTASSIUM CHLORIDE, CALCIUM CHLORIDE 600; 310; 30; 20 MG/100ML; MG/100ML; MG/100ML; MG/100ML
INJECTION, SOLUTION INTRAVENOUS CONTINUOUS PRN
Status: DISCONTINUED | OUTPATIENT
Start: 2022-07-26 | End: 2022-07-26

## 2022-07-26 RX ORDER — SODIUM CHLORIDE, SODIUM LACTATE, POTASSIUM CHLORIDE, CALCIUM CHLORIDE 600; 310; 30; 20 MG/100ML; MG/100ML; MG/100ML; MG/100ML
50 INJECTION, SOLUTION INTRAVENOUS CONTINUOUS
Status: DISCONTINUED | OUTPATIENT
Start: 2022-07-26 | End: 2022-07-30 | Stop reason: HOSPADM

## 2022-07-26 RX ORDER — FLUOXETINE HYDROCHLORIDE 20 MG/1
20 CAPSULE ORAL EVERY MORNING
COMMUNITY
Start: 2022-06-15

## 2022-07-26 RX ORDER — LEVETIRACETAM 500 MG/1
500 TABLET ORAL 2 TIMES DAILY
COMMUNITY
Start: 2022-07-11

## 2022-07-26 RX ADMIN — PROPOFOL 50 MG: 10 INJECTION, EMULSION INTRAVENOUS at 10:05

## 2022-07-26 RX ADMIN — PROPOFOL 100 MG: 10 INJECTION, EMULSION INTRAVENOUS at 09:59

## 2022-07-26 RX ADMIN — PROPOFOL 50 MG: 10 INJECTION, EMULSION INTRAVENOUS at 10:07

## 2022-07-26 RX ADMIN — SODIUM CHLORIDE, SODIUM LACTATE, POTASSIUM CHLORIDE, CALCIUM CHLORIDE 50 ML/HR: 600; 310; 30; 20 INJECTION, SOLUTION INTRAVENOUS at 09:21

## 2022-07-26 RX ADMIN — SODIUM CHLORIDE, SODIUM LACTATE, POTASSIUM CHLORIDE, CALCIUM CHLORIDE: 600; 310; 30; 20 INJECTION, SOLUTION INTRAVENOUS at 09:46

## 2022-07-26 RX ADMIN — PROPOFOL 50 MG: 10 INJECTION, EMULSION INTRAVENOUS at 10:01

## 2022-07-26 RX ADMIN — PROPOFOL 50 MG: 10 INJECTION, EMULSION INTRAVENOUS at 10:10

## 2022-07-26 NOTE — ANESTHESIA PREPROCEDURE EVALUATION
Procedure:  COLONOSCOPY    Relevant Problems   CARDIO   (+) Hypertension      GI/HEPATIC   (+) GERD (gastroesophageal reflux disease)      NEURO/PSYCH   (+) Anxiety      Other   (+) Alcohol abuse        Physical Exam    Airway    Mallampati score: II  TM Distance: >3 FB  Neck ROM: full     Dental   Comment: Extremely poor dentition  Missing, broken teeth with severe decay throughout ,     Cardiovascular      Pulmonary      Other Findings        Anesthesia Plan  ASA Score- 3     Anesthesia Type- IV sedation with anesthesia with ASA Monitors  Additional Monitors:   Airway Plan:     Comment: Pts /101  Looking back at past ER visits, pt is non-compliant with meds and has had /119 range  Instructed we will do the colonoscopy because he is prepped and pt needs to be more compliant with meds and see his PCP  Glynn Duran Plan Factors-    Patient summary reviewed  Patient is not a current smoker  Induction- intravenous  Postoperative Plan-     Informed Consent- Anesthetic plan and risks discussed with patient  I personally reviewed this patient with the CRNA  Discussed and agreed on the Anesthesia Plan with the KATI Duran

## 2022-07-26 NOTE — ANESTHESIA POSTPROCEDURE EVALUATION
Post-Op Assessment Note    CV Status:  Stable  Pain Score: 0    Pain management: adequate     Mental Status:  Arousable and sleepy   Hydration Status:  Stable   PONV Controlled:  Controlled   Airway Patency:  Patent      Post Op Vitals Reviewed: Yes      Staff: CRNA         No complications documented      BP  123/81   Temp 97 6   Pulse 79   Resp 14   SpO2 99

## 2022-07-26 NOTE — TELEPHONE ENCOUNTER
Patient had colonoscopy today for rectal bleeding  No findings other than hemorrhoids  He would like to set up banding, please arrange three banding visits, any physician

## 2022-07-26 NOTE — H&P
History and Physical - [facility]  Venkata Flank 73 y o  male MRN: [id number]    HPI: Venkata Armstrong is a 73y o  year old male who presents for rectal bleeding    REVIEW OF SYSTEMS: Per the HPI, and otherwise unremarkable  Historical Information   Past Medical History:   Diagnosis Date    Alcohol abuse     Anxiety     Cancer ([address] )     kidney    Depression     GERD (gastroesophageal reflux disease)     Hypertension      Past Surgical History:   Procedure Laterality Date    NEPHRECTOMY Left     UMBILICAL HERNIA REPAIR       Social History   Social History     Substance and Sexual Activity   Alcohol Use Yes    Comment: a gallon of gin a week     Social History     Substance and Sexual Activity   Drug Use Never     Social History     Tobacco Use   Smoking Status Never Smoker   Smokeless Tobacco Never Used     Family History   Problem Relation Age of Onset    Colon cancer Neg Hx     Colon polyps Neg Hx        Meds/Allergies       Current Outpatient Medications:     amLODIPine (NORVASC) 10 mg tablet    docusate sodium (COLACE) 100 mg capsule    FLUoxetine (PROzac) 20 mg capsule    levETIRAcetam (KEPPRA) 500 mg tablet    pantoprazole (PROTONIX) 40 mg tablet    polyethylene glycol (Golytely) 4000 mL solution    Current Facility-Administered Medications:     lactated ringers infusion, 50 mL/hr, Intravenous, Continuous, 50 mL/hr at 07/26/22 2522    Facility-Administered Medications Ordered in Other Encounters:     lactated ringers infusion, , Intravenous, Continuous PRN, New Bag at 07/26/22 0946    No Known Allergies    Objective     BP (!) 206/101   Pulse 87   Temp (!) 97 3 °F (36 3 °C) (Temporal)   Resp 20   Ht 5' 8" (1 727 m)   Wt 88 5 kg (195 lb)   SpO2 96%   BMI 29 65 kg/m²     PHYSICAL EXAM    Gen: NAD AAOx3  Head: Normocephalic, Atraumatic  CV: S1S2 RRR no m/r/g  CHEST: Clear b/l no c/r/w  ABD: soft, +BS NT/ND  EXT: no edema    ASSESSMENT/PLAN:  This is a 73 y o  year old male here for colonoscopy, and he is stable and optimized for his procedure

## 2022-08-23 ENCOUNTER — TELEPHONE (OUTPATIENT)
Dept: OTHER | Facility: OTHER | Age: 67
End: 2022-08-23

## 2022-08-23 NOTE — TELEPHONE ENCOUNTER
Patient is calling regarding cancelling an appointment      Date/Time: 8/23/22 2:00pm     Patient was rescheduled: YES [] NO [x]    Patient requesting call back to reschedule: YES [x] NO []

## 2022-09-01 ENCOUNTER — TELEPHONE (OUTPATIENT)
Dept: GASTROENTEROLOGY | Facility: CLINIC | Age: 67
End: 2022-09-01

## 2022-09-01 NOTE — TELEPHONE ENCOUNTER
Left message for patient regarding lab work from previous office visit as well as ultrasound of the abdomen that has not been done per EMR  Requested patient contact the office with any questions

## 2022-09-08 ENCOUNTER — HOSPITAL ENCOUNTER (EMERGENCY)
Facility: HOSPITAL | Age: 67
Discharge: HOME | End: 2022-09-08
Attending: EMERGENCY MEDICINE
Payer: MEDICARE

## 2022-09-08 ENCOUNTER — APPOINTMENT (OUTPATIENT)
Dept: RADIOLOGY | Facility: HOSPITAL | Age: 67
End: 2022-09-08
Attending: EMERGENCY MEDICINE
Payer: MEDICARE

## 2022-09-08 ENCOUNTER — APPOINTMENT (EMERGENCY)
Dept: CT IMAGING | Facility: HOSPITAL | Age: 67
End: 2022-09-08
Attending: EMERGENCY MEDICINE
Payer: MEDICARE

## 2022-09-08 VITALS
HEART RATE: 67 BPM | TEMPERATURE: 97.8 F | SYSTOLIC BLOOD PRESSURE: 122 MMHG | WEIGHT: 195 LBS | BODY MASS INDEX: 29.55 KG/M2 | DIASTOLIC BLOOD PRESSURE: 77 MMHG | OXYGEN SATURATION: 99 % | HEIGHT: 68 IN | RESPIRATION RATE: 16 BRPM

## 2022-09-08 DIAGNOSIS — F10.929 ALCOHOL INTOXICATION (HCC): ICD-10-CM

## 2022-09-08 DIAGNOSIS — S00.83XA FACIAL CONTUSION: Primary | ICD-10-CM

## 2022-09-08 LAB
ALBUMIN SERPL BCP-MCNC: 3.9 G/DL (ref 3.5–5)
ALP SERPL-CCNC: 93 U/L (ref 46–116)
ALT SERPL W P-5'-P-CCNC: 37 U/L (ref 12–78)
ANION GAP SERPL CALCULATED.3IONS-SCNC: 11 MMOL/L (ref 4–13)
AST SERPL W P-5'-P-CCNC: 22 U/L (ref 5–45)
BASOPHILS # BLD AUTO: 0.05 THOUSANDS/ΜL (ref 0–0.1)
BASOPHILS NFR BLD AUTO: 1 % (ref 0–1)
BILIRUB SERPL-MCNC: 0.7 MG/DL (ref 0.2–1)
BUN SERPL-MCNC: 9 MG/DL (ref 5–25)
CALCIUM SERPL-MCNC: 8.4 MG/DL (ref 8.3–10.1)
CHLORIDE SERPL-SCNC: 104 MMOL/L (ref 96–108)
CO2 SERPL-SCNC: 26 MMOL/L (ref 21–32)
CREAT SERPL-MCNC: 1.26 MG/DL (ref 0.6–1.3)
EOSINOPHIL # BLD AUTO: 0.29 THOUSAND/ΜL (ref 0–0.61)
EOSINOPHIL NFR BLD AUTO: 5 % (ref 0–6)
ERYTHROCYTE [DISTWIDTH] IN BLOOD BY AUTOMATED COUNT: 13.3 % (ref 11.6–15.1)
ETHANOL EXG-MCNC: 0.06 MG/DL
ETHANOL EXG-MCNC: 0.17 MG/DL
ETHANOL SERPL-MCNC: 291 MG/DL (ref 0–3)
GFR SERPL CREATININE-BSD FRML MDRD: 59 ML/MIN/1.73SQ M
GLUCOSE SERPL-MCNC: 110 MG/DL (ref 65–140)
HCT VFR BLD AUTO: 42.4 % (ref 36.5–49.3)
HGB BLD-MCNC: 14.8 G/DL (ref 12–17)
IMM GRANULOCYTES # BLD AUTO: 0.02 THOUSAND/UL (ref 0–0.2)
IMM GRANULOCYTES NFR BLD AUTO: 0 % (ref 0–2)
LYMPHOCYTES # BLD AUTO: 3.15 THOUSANDS/ΜL (ref 0.6–4.47)
LYMPHOCYTES NFR BLD AUTO: 56 % (ref 14–44)
MCH RBC QN AUTO: 32.1 PG (ref 26.8–34.3)
MCHC RBC AUTO-ENTMCNC: 34.9 G/DL (ref 31.4–37.4)
MCV RBC AUTO: 92 FL (ref 82–98)
MONOCYTES # BLD AUTO: 0.43 THOUSAND/ΜL (ref 0.17–1.22)
MONOCYTES NFR BLD AUTO: 8 % (ref 4–12)
NEUTROPHILS # BLD AUTO: 1.66 THOUSANDS/ΜL (ref 1.85–7.62)
NEUTS SEG NFR BLD AUTO: 30 % (ref 43–75)
NRBC BLD AUTO-RTO: 0 /100 WBCS
PLATELET # BLD AUTO: 273 THOUSANDS/UL (ref 149–390)
PMV BLD AUTO: 9.9 FL (ref 8.9–12.7)
POTASSIUM SERPL-SCNC: 3.8 MMOL/L (ref 3.5–5.3)
PROT SERPL-MCNC: 7.7 G/DL (ref 6.4–8.4)
RBC # BLD AUTO: 4.61 MILLION/UL (ref 3.88–5.62)
SODIUM SERPL-SCNC: 141 MMOL/L (ref 135–147)
WBC # BLD AUTO: 5.6 THOUSAND/UL (ref 4.31–10.16)

## 2022-09-08 PROCEDURE — 70450 CT HEAD/BRAIN W/O DYE: CPT

## 2022-09-08 PROCEDURE — 72170 X-RAY EXAM OF PELVIS: CPT

## 2022-09-08 PROCEDURE — 82075 ASSAY OF BREATH ETHANOL: CPT | Performed by: EMERGENCY MEDICINE

## 2022-09-08 PROCEDURE — 93005 ELECTROCARDIOGRAM TRACING: CPT

## 2022-09-08 PROCEDURE — 99285 EMERGENCY DEPT VISIT HI MDM: CPT | Performed by: EMERGENCY MEDICINE

## 2022-09-08 PROCEDURE — 82077 ASSAY SPEC XCP UR&BREATH IA: CPT | Performed by: EMERGENCY MEDICINE

## 2022-09-08 PROCEDURE — 80053 COMPREHEN METABOLIC PANEL: CPT | Performed by: EMERGENCY MEDICINE

## 2022-09-08 PROCEDURE — 72125 CT NECK SPINE W/O DYE: CPT

## 2022-09-08 PROCEDURE — 99285 EMERGENCY DEPT VISIT HI MDM: CPT

## 2022-09-08 PROCEDURE — 36415 COLL VENOUS BLD VENIPUNCTURE: CPT | Performed by: EMERGENCY MEDICINE

## 2022-09-08 PROCEDURE — 71045 X-RAY EXAM CHEST 1 VIEW: CPT

## 2022-09-08 PROCEDURE — 85025 COMPLETE CBC W/AUTO DIFF WBC: CPT | Performed by: EMERGENCY MEDICINE

## 2022-09-08 PROCEDURE — 82075 ASSAY OF BREATH ETHANOL: CPT

## 2022-09-08 NOTE — ED PROVIDER NOTES
Emergency Department Trauma Note  Tyson Mahoney 73 y o  male MRN: [id number]  Unit/Bed#: Z1 H2/Z1 H2 Encounter: [id number]      Trauma Alert: Trauma Acuity: Trauma Evaluation  Model of Arrival: Mode of Arrival: BLS via    Trauma Team: Current Providers  Attending Provider: Tao Sin DO  Registered Nurse: Brenda Salinas RN  Registered Nurse: Ryan Hsieh RN  Consultants:     None      History of Present Illness     Chief Complaint:   Chief Complaint   Patient presents with    Fall     Patient presents to the ED s/p mechanical fall while walking outside, states ETOH this morning      HPI:  Tyson Mahoney is a 73 y o  male who presents with  Facial injury after trip and fall patient is intoxicated so trauma evaluation was called  Mechanism:Details of Incident: mechanical fall while walking  Injury Date: 09/08/22 Injury Time: 80       71-year-old male who tripped and fell on the sidewalk striking his face trauma evaluation called secondary to alcohol intoxication  He  Has poor dental hygiene with them that abrasion to the mid upper gumline  History provided by:  Patient  Medical Problem  Location:   facial  Quality:   contusion and abrasion  Severity:  Moderate  Onset quality:  Sudden  Timing:  Constant  Progression:  Unchanged  Chronicity:  New  Context:   trip and fall   Worsened by:   alcohol intoxication    Review of Systems   HENT: Positive for facial swelling  Facial injury   Musculoskeletal: Negative for back pain  Neurological: Negative for syncope  All other systems reviewed and are negative  Historical Information     Immunizations: There is no immunization history on file for this patient      Past Medical History:   Diagnosis Date    Alcohol abuse     Anxiety     Cancer ([facility] [address] )     kidney    Depression     GERD (gastroesophageal reflux disease)     Hypertension        Family History   Problem Relation Age of Onset    Colon cancer Neg Hx     Colon polyps Neg Hx      Past Surgical History:   Procedure Laterality Date    NEPHRECTOMY Left     UMBILICAL HERNIA REPAIR       Social History     Tobacco Use    Smoking status: Never Smoker    Smokeless tobacco: Never Used   Vaping Use    Vaping Use: Never used   Substance Use Topics    Alcohol use: Yes     Comment: a gallon of gin a week    Drug use: Never     E-Cigarette/Vaping    E-Cigarette Use Never User      E-Cigarette/Vaping Substances    Nicotine No     THC No     CBD No     Flavoring No     Other No     Unknown No        Family History: non-contributory    Meds/Allergies   Prior to Admission Medications   Prescriptions Last Dose Informant Patient Reported? Taking? FLUoxetine (PROzac) 20 mg capsule   Yes No   Sig: Take 20 mg by mouth every morning   amLODIPine (NORVASC) 10 mg tablet  Self Yes Yes   docusate sodium (COLACE) 100 mg capsule  Self No No   Sig: Take 1 capsule (100 mg total) by mouth every 12 (twelve) hours   levETIRAcetam (KEPPRA) 500 mg tablet   Yes Yes   Sig: Take 500 mg by mouth 2 (two) times a day   pantoprazole (PROTONIX) 40 mg tablet  Self Yes No      Facility-Administered Medications: None       No Known Allergies    PHYSICAL EXAM    PE limited by:   Alcohol use    Objective   Vitals:   First set: Temperature: 97 5 °F (36 4 °C) (09/08/22 1049)  Pulse: 87 (09/08/22 1047)  Respirations: 18 (09/08/22 1047)  Blood Pressure: 162/81 (09/08/22 1047)  SpO2: 95 % (09/08/22 1047)    Primary Survey:   (A) Airway:  patent  (B) Breathing:  Clear bilateral  (C) Circulation: Pulses:   normal  (D) Disabliity:  GCS Total:  15  (E) Expose:  Completed    Secondary Survey: (Click on Physical Exam tab above)  Physical Exam  Vitals and nursing note reviewed  Constitutional:       General: He is not in acute distress  Appearance: He is not ill-appearing, toxic-appearing or diaphoretic  Comments: Intoxicated but awake and oriented answering appropriately   HENT:      Head: Normocephalic  Right Ear: Tympanic membrane, ear canal and external ear normal       Left Ear: Tympanic membrane, ear canal and external ear normal       Mouth/Throat:      Comments: Mouth contusion and abrasion to the upper gumline with poor dental hygiene and multiple   Corroded teeth  Eyes:      General: No scleral icterus  Right eye: No discharge  Left eye: No discharge  Extraocular Movements: Extraocular movements intact  Comments: Pupils 3-4 mm and sluggish bilateral   Neck:      Comments: Cervical collar in place  Cardiovascular:      Rate and Rhythm: Normal rate and regular rhythm  Pulses: Normal pulses  Heart sounds: No murmur heard  No friction rub  No gallop  Pulmonary:      Effort: Pulmonary effort is normal  No respiratory distress  Breath sounds: No stridor  No wheezing, rhonchi or rales  Abdominal:      General: There is no distension  Palpations: Abdomen is soft  Tenderness: There is no abdominal tenderness  There is no guarding or rebound  Musculoskeletal:         General: No swelling, tenderness, deformity or signs of injury  Normal range of motion  Right lower leg: No edema  Left lower leg: No edema  Skin:     General: Skin is warm and dry  Coloration: Skin is not jaundiced  Findings: No bruising, erythema or rash  Neurological:      General: No focal deficit present  Mental Status: He is oriented to person, place, and time  Cranial Nerves: No cranial nerve deficit  Sensory: No sensory deficit  Coordination: Coordination abnormal    Psychiatric:         Behavior: Behavior normal          Thought Content: Thought content normal          Cervical spine cleared by clinical criteria?  No (imaging required)      Invasive Devices  Report    Peripheral Intravenous Line  Duration           Peripheral IV 09/08/22 Right Antecubital <1 day                Lab Results:   Results Reviewed     Procedure Component Value Units Date/Time    POCT alcohol breath test [[id number]]  (Normal) Resulted: 09/08/22 1551    Lab Status: Final result Updated: 09/08/22 1551     EXTBreath Alcohol 0 17    Comprehensive metabolic panel [[id number]] Collected: 09/08/22 1100    Lab Status: Final result Specimen: Blood from Arm, Right Updated: 09/08/22 1126     Sodium 141 mmol/L      Potassium 3 8 mmol/L      Chloride 104 mmol/L      CO2 26 mmol/L      ANION GAP 11 mmol/L      BUN 9 mg/dL      Creatinine 1 26 mg/dL      Glucose 110 mg/dL      Calcium 8 4 mg/dL      AST 22 U/L      ALT 37 U/L      Alkaline Phosphatase 93 U/L      Total Protein 7 7 g/dL      Albumin 3 9 g/dL      Total Bilirubin 0 70 mg/dL      eGFR 59 ml/min/1 73sq m     Narrative:      [facility] guidelines for Chronic Kidney Disease (CKD):     Stage 1 with normal or high GFR (GFR > 90 mL/min/1 73 square meters)    Stage 2 Mild CKD (GFR = 60-89 mL/min/1 73 square meters)    Stage 3A Moderate CKD (GFR = 45-59 mL/min/1 73 square meters)    Stage 3B Moderate CKD (GFR = 30-44 mL/min/1 73 square meters)    Stage 4 Severe CKD (GFR = 15-29 mL/min/1 73 square meters)    Stage 5 End Stage CKD (GFR <15 mL/min/1 73 square meters)  Note: GFR calculation is accurate only with a steady state creatinine    Ethanol [[id number]]  (Abnormal) Collected: 09/08/22 1100    Lab Status: Final result Specimen: Blood from Arm, Right Updated: 09/08/22 1121     Ethanol Lvl 291 mg/dL     CBC and differential [[id number]]  (Abnormal) Collected: 09/08/22 1100    Lab Status: Final result Specimen: Blood from Arm, Right Updated: 09/08/22 1109     WBC 5 60 Thousand/uL      RBC 4 61 Million/uL      Hemoglobin 14 8 g/dL      Hematocrit 42 4 %      MCV 92 fL      MCH 32 1 pg      MCHC 34 9 g/dL      RDW 13 3 %      MPV 9 9 fL      Platelets 262 Thousands/uL      nRBC 0 /100 WBCs      Neutrophils Relative 30 %      Immat GRANS % 0 %      Lymphocytes Relative 56 %      Monocytes Relative 8 % Eosinophils Relative 5 %      Basophils Relative 1 %      Neutrophils Absolute 1 66 Thousands/µL      Immature Grans Absolute 0 02 Thousand/uL      Lymphocytes Absolute 3 15 Thousands/µL      Monocytes Absolute 0 43 Thousand/µL      Eosinophils Absolute 0 29 Thousand/µL      Basophils Absolute 0 05 Thousands/µL                  Imaging Studies:   Direct to CT: Yes  TRAUMA - CT head wo contrast   Final Result by Lois West MD (09/08 1152)      No acute intracranial CT abnormality  Workstation performed: [id number]         TRAUMA - CT spine cervical wo contrast   Final Result by Lois West MD (09/08 1158)      1  No acute cervical spine fracture or traumatic malalignment  2   Degenerative change as above  Workstation performed: [id number]         XR Trauma chest portable   Final Result by Araceli Stewart MD (09/08 1116)      No acute cardiopulmonary disease  Workstation performed: [id number]         XR Trauma pelvis ap only 1 or 2 vw   Final Result by Araceli Stewart MD (09/08 1117)      No acute osseous abnormality        Workstation performed: [id number]               Procedures  ECG 12 Lead Documentation Only    Date/Time: 9/8/2022 11:03 AM  Performed by: Wilmar Eli DO  Authorized by: Wilmar Eli DO     ECG reviewed by me, the ED Provider: yes    Patient location:  ED  Rate:     ECG rate:  79  Rhythm:     Rhythm: sinus rhythm    Conduction:     Conduction: normal    ST segments:     ST segments:  Non-specific             ED Course  ED Course as of 09/08/22 1630   Thu Sep 08, 2022   1249  Patient cleared medically other than alcohol intoxication he states that he lives alone and does not have anybody to pick him up will need to observe until his level comes down           MDM  Number of Diagnoses or Management Options  Diagnosis management comments:  Trip and fall while intoxicated trauma evaluation called workup in progress including lab work and x-rays       Amount and/or Complexity of Data Reviewed  Clinical lab tests: ordered  Tests in the radiology section of CPT®: ordered            Disposition  [facility] Transfer: No  Final diagnoses:   Facial contusion   Alcohol intoxication ([facility] [address] )     Time reflects when diagnosis was documented in both MDM as applicable and the Disposition within this note     Time User Action Codes Description Comment    9/8/2022 11:43 AM B[place]ide [place] Add [S00 83XA] Facial contusion     9/8/2022 11:43 AM Lily Columbus Add [F10 929] Alcohol intoxication [facility])       ED Disposition     None      Follow-up Information     Follow up With Specialties Details Why Contact Info Additional Information    Chely Almanza MD Internal Medicine   [address] Emergency Department Emergency Medicine  As needed, If symptoms worsen [address] [place], [address]  [address] [facility] Emergency Department, [address] [facility], [facility], [facility]        Patient's Medications   Discharge Prescriptions    No medications on file     No discharge procedures on file      PDMP Review     None          ED Provider  Electronically Signed by         Maxime Saenz DO  09/08/22 1205

## 2022-09-09 LAB
ATRIAL RATE: 79 BPM
P AXIS: 62 DEGREES
PR INTERVAL: 170 MS
QRS AXIS: -33 DEGREES
QRSD INTERVAL: 68 MS
QT INTERVAL: 404 MS
QTC INTERVAL: 463 MS
T WAVE AXIS: 39 DEGREES
VENTRICULAR RATE: 79 BPM

## 2022-09-09 PROCEDURE — 93010 ELECTROCARDIOGRAM REPORT: CPT | Performed by: INTERNAL MEDICINE

## 2022-09-15 ENCOUNTER — TELEPHONE (OUTPATIENT)
Dept: GASTROENTEROLOGY | Facility: CLINIC | Age: 67
End: 2022-09-15

## 2022-09-15 NOTE — TELEPHONE ENCOUNTER
Called pt today to check on labs/US ordered in June  Reminded him of appt 9/26/2022 at 9:30 am   He states he has to arrange his "bus" for the appointment

## 2022-09-26 ENCOUNTER — TELEPHONE (OUTPATIENT)
Dept: GASTROENTEROLOGY | Facility: CLINIC | Age: 67
End: 2022-09-26

## 2022-10-06 ENCOUNTER — HOSPITAL ENCOUNTER (EMERGENCY)
Facility: HOSPITAL | Age: 67
Discharge: HOME | End: 2022-10-06
Attending: EMERGENCY MEDICINE
Payer: MEDICARE

## 2022-10-06 VITALS
HEIGHT: 68 IN | SYSTOLIC BLOOD PRESSURE: 114 MMHG | RESPIRATION RATE: 17 BRPM | TEMPERATURE: 97.9 F | DIASTOLIC BLOOD PRESSURE: 64 MMHG | HEART RATE: 74 BPM | BODY MASS INDEX: 28.64 KG/M2 | OXYGEN SATURATION: 94 % | WEIGHT: 189 LBS

## 2022-10-06 DIAGNOSIS — F10.929 ALCOHOL INTOXICATION (HCC): Primary | ICD-10-CM

## 2022-10-06 DIAGNOSIS — R45.851 SUICIDAL IDEATION: ICD-10-CM

## 2022-10-06 LAB
ALBUMIN SERPL BCP-MCNC: 3.6 G/DL (ref 3.5–5)
ALP SERPL-CCNC: 105 U/L (ref 46–116)
ALT SERPL W P-5'-P-CCNC: 25 U/L (ref 12–78)
AMPHETAMINES SERPL QL SCN: NEGATIVE
ANION GAP SERPL CALCULATED.3IONS-SCNC: 12 MMOL/L (ref 4–13)
APTT PPP: 26 SECONDS (ref 23–37)
AST SERPL W P-5'-P-CCNC: 32 U/L (ref 5–45)
BARBITURATES UR QL: NEGATIVE
BASOPHILS # BLD AUTO: 0.05 THOUSANDS/ΜL (ref 0–0.1)
BASOPHILS NFR BLD AUTO: 1 % (ref 0–1)
BENZODIAZ UR QL: NEGATIVE
BILIRUB SERPL-MCNC: 1.2 MG/DL (ref 0.2–1)
BILIRUB UR QL STRIP: NEGATIVE
BUN SERPL-MCNC: 9 MG/DL (ref 5–25)
CALCIUM SERPL-MCNC: 8.6 MG/DL (ref 8.3–10.1)
CHLORIDE SERPL-SCNC: 95 MMOL/L (ref 96–108)
CLARITY UR: CLEAR
CO2 SERPL-SCNC: 24 MMOL/L (ref 21–32)
COCAINE UR QL: NEGATIVE
COLOR UR: ABNORMAL
CREAT SERPL-MCNC: 1.22 MG/DL (ref 0.6–1.3)
EOSINOPHIL # BLD AUTO: 0.26 THOUSAND/ΜL (ref 0–0.61)
EOSINOPHIL NFR BLD AUTO: 4 % (ref 0–6)
ERYTHROCYTE [DISTWIDTH] IN BLOOD BY AUTOMATED COUNT: 13.4 % (ref 11.6–15.1)
ETHANOL EXG-MCNC: 0.17 MG/DL
ETHANOL EXG-MCNC: 0.22 MG/DL
FLUAV RNA RESP QL NAA+PROBE: NEGATIVE
FLUBV RNA RESP QL NAA+PROBE: NEGATIVE
GFR SERPL CREATININE-BSD FRML MDRD: 61 ML/MIN/1.73SQ M
GLUCOSE SERPL-MCNC: 96 MG/DL (ref 65–140)
GLUCOSE UR STRIP-MCNC: NEGATIVE MG/DL
HCT VFR BLD AUTO: 40 % (ref 36.5–49.3)
HGB BLD-MCNC: 14.1 G/DL (ref 12–17)
HGB UR QL STRIP.AUTO: NEGATIVE
IMM GRANULOCYTES # BLD AUTO: 0.01 THOUSAND/UL (ref 0–0.2)
IMM GRANULOCYTES NFR BLD AUTO: 0 % (ref 0–2)
INR PPP: 0.98 (ref 0.84–1.19)
KETONES UR STRIP-MCNC: NEGATIVE MG/DL
LEUKOCYTE ESTERASE UR QL STRIP: NEGATIVE
LYMPHOCYTES # BLD AUTO: 3.33 THOUSANDS/ΜL (ref 0.6–4.47)
LYMPHOCYTES NFR BLD AUTO: 43 % (ref 14–44)
MAGNESIUM SERPL-MCNC: 2 MG/DL (ref 1.6–2.6)
MCH RBC QN AUTO: 31.8 PG (ref 26.8–34.3)
MCHC RBC AUTO-ENTMCNC: 35.3 G/DL (ref 31.4–37.4)
MCV RBC AUTO: 90 FL (ref 82–98)
METHADONE UR QL: NEGATIVE
MONOCYTES # BLD AUTO: 0.64 THOUSAND/ΜL (ref 0.17–1.22)
MONOCYTES NFR BLD AUTO: 9 % (ref 4–12)
NEUTROPHILS # BLD AUTO: 3.23 THOUSANDS/ΜL (ref 1.85–7.62)
NEUTS SEG NFR BLD AUTO: 43 % (ref 43–75)
NITRITE UR QL STRIP: NEGATIVE
NRBC BLD AUTO-RTO: 0 /100 WBCS
OPIATES UR QL SCN: NEGATIVE
OXYCODONE+OXYMORPHONE UR QL SCN: NEGATIVE
PCP UR QL: NEGATIVE
PH UR STRIP.AUTO: 5.5 [PH]
PLATELET # BLD AUTO: 285 THOUSANDS/UL (ref 149–390)
PMV BLD AUTO: 9.4 FL (ref 8.9–12.7)
POTASSIUM SERPL-SCNC: 3.4 MMOL/L (ref 3.5–5.3)
PROT SERPL-MCNC: 7.4 G/DL (ref 6.4–8.4)
PROT UR STRIP-MCNC: NEGATIVE MG/DL
PROTHROMBIN TIME: 13.7 SECONDS (ref 11.6–14.5)
RBC # BLD AUTO: 4.44 MILLION/UL (ref 3.88–5.62)
RSV RNA RESP QL NAA+PROBE: NEGATIVE
SARS-COV-2 RNA RESP QL NAA+PROBE: NEGATIVE
SODIUM SERPL-SCNC: 131 MMOL/L (ref 135–147)
SP GR UR STRIP.AUTO: <1.005 (ref 1–1.03)
THC UR QL: NEGATIVE
TSH SERPL DL<=0.05 MIU/L-ACNC: 1.68 UIU/ML (ref 0.45–4.5)
UROBILINOGEN UR STRIP-ACNC: 2 MG/DL
WBC # BLD AUTO: 7.52 THOUSAND/UL (ref 4.31–10.16)

## 2022-10-06 PROCEDURE — 85025 COMPLETE CBC W/AUTO DIFF WBC: CPT | Performed by: EMERGENCY MEDICINE

## 2022-10-06 PROCEDURE — 36415 COLL VENOUS BLD VENIPUNCTURE: CPT | Performed by: EMERGENCY MEDICINE

## 2022-10-06 PROCEDURE — 99285 EMERGENCY DEPT VISIT HI MDM: CPT | Performed by: EMERGENCY MEDICINE

## 2022-10-06 PROCEDURE — 85730 THROMBOPLASTIN TIME PARTIAL: CPT | Performed by: EMERGENCY MEDICINE

## 2022-10-06 PROCEDURE — 81003 URINALYSIS AUTO W/O SCOPE: CPT | Performed by: EMERGENCY MEDICINE

## 2022-10-06 PROCEDURE — 93005 ELECTROCARDIOGRAM TRACING: CPT

## 2022-10-06 PROCEDURE — 83735 ASSAY OF MAGNESIUM: CPT | Performed by: EMERGENCY MEDICINE

## 2022-10-06 PROCEDURE — 84443 ASSAY THYROID STIM HORMONE: CPT | Performed by: EMERGENCY MEDICINE

## 2022-10-06 PROCEDURE — 82075 ASSAY OF BREATH ETHANOL: CPT | Performed by: EMERGENCY MEDICINE

## 2022-10-06 PROCEDURE — 80307 DRUG TEST PRSMV CHEM ANLYZR: CPT | Performed by: EMERGENCY MEDICINE

## 2022-10-06 PROCEDURE — 99285 EMERGENCY DEPT VISIT HI MDM: CPT

## 2022-10-06 PROCEDURE — 80053 COMPREHEN METABOLIC PANEL: CPT | Performed by: EMERGENCY MEDICINE

## 2022-10-06 PROCEDURE — 85610 PROTHROMBIN TIME: CPT | Performed by: EMERGENCY MEDICINE

## 2022-10-06 PROCEDURE — 0241U HB NFCT DS VIR RESP RNA 4 TRGT: CPT | Performed by: EMERGENCY MEDICINE

## 2022-10-06 RX ORDER — PANTOPRAZOLE SODIUM 40 MG/1
40 TABLET, DELAYED RELEASE ORAL
Status: DISCONTINUED | OUTPATIENT
Start: 2022-10-06 | End: 2022-10-06 | Stop reason: HOSPADM

## 2022-10-06 RX ORDER — FOLIC ACID 1 MG/1
1 TABLET ORAL DAILY
Status: DISCONTINUED | OUTPATIENT
Start: 2022-10-06 | End: 2022-10-06 | Stop reason: HOSPADM

## 2022-10-06 RX ORDER — POTASSIUM CHLORIDE 20 MEQ/1
20 TABLET, EXTENDED RELEASE ORAL ONCE
Status: COMPLETED | OUTPATIENT
Start: 2022-10-06 | End: 2022-10-06

## 2022-10-06 RX ORDER — FLUOXETINE 10 MG/1
20 CAPSULE ORAL DAILY
Status: DISCONTINUED | OUTPATIENT
Start: 2022-10-06 | End: 2022-10-06 | Stop reason: HOSPADM

## 2022-10-06 RX ORDER — LEVETIRACETAM 250 MG/1
500 TABLET ORAL EVERY 12 HOURS SCHEDULED
Status: DISCONTINUED | OUTPATIENT
Start: 2022-10-06 | End: 2022-10-06 | Stop reason: HOSPADM

## 2022-10-06 RX ORDER — LANOLIN ALCOHOL/MO/W.PET/CERES
100 CREAM (GRAM) TOPICAL DAILY
Status: DISCONTINUED | OUTPATIENT
Start: 2022-10-06 | End: 2022-10-06 | Stop reason: HOSPADM

## 2022-10-06 RX ORDER — DOCUSATE SODIUM 100 MG/1
100 CAPSULE, LIQUID FILLED ORAL DAILY
Status: DISCONTINUED | OUTPATIENT
Start: 2022-10-06 | End: 2022-10-06 | Stop reason: HOSPADM

## 2022-10-06 RX ORDER — AMLODIPINE BESYLATE 5 MG/1
10 TABLET ORAL DAILY
Status: DISCONTINUED | OUTPATIENT
Start: 2022-10-06 | End: 2022-10-06 | Stop reason: HOSPADM

## 2022-10-06 RX ADMIN — POTASSIUM CHLORIDE 20 MEQ: 20 TABLET, EXTENDED RELEASE ORAL at 05:02

## 2022-10-06 RX ADMIN — DOCUSATE SODIUM 100 MG: 100 CAPSULE, LIQUID FILLED ORAL at 08:41

## 2022-10-06 RX ADMIN — MULTIPLE VITAMINS W/ MINERALS TAB 1 TABLET: TAB ORAL at 08:41

## 2022-10-06 RX ADMIN — FOLIC ACID 1 MG: 1 TABLET ORAL at 08:41

## 2022-10-06 RX ADMIN — AMLODIPINE BESYLATE 10 MG: 5 TABLET ORAL at 08:41

## 2022-10-06 RX ADMIN — PANTOPRAZOLE SODIUM 40 MG: 40 TABLET, DELAYED RELEASE ORAL at 08:41

## 2022-10-06 RX ADMIN — FLUOXETINE 20 MG: 10 CAPSULE ORAL at 08:41

## 2022-10-06 RX ADMIN — LEVETIRACETAM 500 MG: 250 TABLET, FILM COATED ORAL at 08:41

## 2022-10-06 RX ADMIN — THIAMINE HCL TAB 100 MG 100 MG: 100 TAB at 08:40

## 2022-10-06 NOTE — ED CARE HANDOFF
Emergency Department Sign Out Note        Sign out and transfer of care from Dr Ursula Allen  See Separate Emergency Department note  The patient, Destiny Ortiz, was evaluated by the previous provider for suicidal ideation and alcohol intoxication  Workup Completed:  Labs unremarkable  Awaiting sobriety  ED Course / Workup Pending (followup): Patient evaluated by Crisis  Declined 201  No criteria for 302  Patient discharged with outpatient resources  Return precautions provided  Procedures  MDM        Disposition  Final diagnoses:   Alcohol intoxication ([address] )   Suicidal ideation     Time reflects when diagnosis was documented in both MDM as applicable and the Disposition within this note     Time User Action Codes Description Comment    10/6/2022  4:46 AM Jose Pina Add [F10 929] Alcohol intoxication ([address] )     10/6/2022  4:46 AM Jose Pina Add [H33 048] Suicidal ideation       ED Disposition     ED Disposition   Discharge    Condition   Stable    Date/Time   Thu Oct 6, 2022 12:26 PM    Comment   Destiny Ortiz discharge to home/self care  Follow-up Information     Follow up With Specialties Details Why Contact Info Additional Sanjeev Maynard [address] Emergency Department Emergency Medicine Go to  If symptoms worsen, thoughts of hurting yourself or others [address] [place],[address]  [address] [facility] Emergency Department, [address] [place] [address] 10    [address]  Call  As needed for rehab and mental health resources in [address] [place] [address]  [address] [place] [address] [address] [facility]  [phone]           Patient's Medications   Discharge Prescriptions    No medications on file     No discharge procedures on file         ED Provider  Electronically Signed by     Silvia Vilchis MD  10/06/22 6723

## 2022-10-06 NOTE — ED PROVIDER NOTES
History  Chief Complaint   Patient presents with    Alcohol Intoxication     Pt been drinking all day and had passive suicidal thoughts  Pt states he had no plan  74 yo M BIBA from [facility] for suicidal ideation and alcohol intoxication  Pt states he is an alcoholic, and he likes drinking  No h/o withdrawal or seizure, however he is on keppra  States he got depressed and thought about killing himself, but he has no plan and "would never do it because I don't like pain " Denies any other complaints  No SI or hallucinations  No recent trauma  Has had inpt [facility] for SI in past        History provided by:  Patient, medical records and EMS personnel   used: No    Suicidal  Presenting symptoms: suicidal thoughts    Degree of incapacity (severity): Unable to specify  Onset quality:  Unable to specify  Chronicity:  Recurrent  Context: alcohol use    Associated symptoms: no abdominal pain, no anxiety, no chest pain, no fatigue and no headaches        Prior to Admission Medications   Prescriptions Last Dose Informant Patient Reported? Taking?    FLUoxetine (PROzac) 20 mg capsule   Yes No   Sig: Take 20 mg by mouth every morning   amLODIPine (NORVASC) 10 mg tablet  Self Yes No   docusate sodium (COLACE) 100 mg capsule  Self No No   Sig: Take 1 capsule (100 mg total) by mouth every 12 (twelve) hours   levETIRAcetam (KEPPRA) 500 mg tablet   Yes No   Sig: Take 500 mg by mouth 2 (two) times a day   pantoprazole (PROTONIX) 40 mg tablet  Self Yes No      Facility-Administered Medications: None       Past Medical History:   Diagnosis Date    Alcohol abuse     Anxiety     Cancer ([address] )     kidney    Depression     GERD (gastroesophageal reflux disease)     Hypertension        Past Surgical History:   Procedure Laterality Date    NEPHRECTOMY Left     UMBILICAL HERNIA REPAIR         Family History   Problem Relation Age of Onset    Colon cancer Neg Hx     Colon polyps Neg Hx      I have reviewed and agree with the history as documented  E-Cigarette/Vaping    E-Cigarette Use Never User      E-Cigarette/Vaping Substances    Nicotine No     THC No     CBD No     Flavoring No     Other No     Unknown No      Social History     Tobacco Use    Smoking status: Never Smoker    Smokeless tobacco: Never Used   Vaping Use    Vaping Use: Never used   Substance Use Topics    Alcohol use: Yes     Comment: a gallon of gin a week    Drug use: Never       Review of Systems   Constitutional: Negative for chills, diaphoresis, fatigue, fever and unexpected weight change  HENT: Negative for congestion, ear pain, rhinorrhea, sore throat, trouble swallowing and voice change  Eyes: Negative for pain and visual disturbance  Respiratory: Negative for cough, chest tightness and shortness of breath  Cardiovascular: Negative for chest pain, palpitations and leg swelling  Gastrointestinal: Negative for abdominal pain, blood in stool, constipation, diarrhea, nausea and vomiting  Genitourinary: Negative for difficulty urinating and hematuria  Musculoskeletal: Negative for arthralgias, back pain and neck pain  Skin: Negative for rash  Neurological: Negative for dizziness, syncope, light-headedness and headaches  Psychiatric/Behavioral: Positive for suicidal ideas  Negative for confusion  The patient is not nervous/anxious  Physical Exam  Physical Exam  Vitals and nursing note reviewed  Constitutional:       General: He is not in acute distress  Appearance: He is well-developed  He is not diaphoretic  Comments: Very disheveled and unkempt  HENT:      Head: Normocephalic and atraumatic  Right Ear: External ear normal       Left Ear: External ear normal       Ears:      Comments: Very [place]     Nose: Nose normal       Mouth/Throat:      Dentition: Abnormal dentition  Dental caries present  Comments: Poor dentition throughout  Eyes:      General: No scleral icterus          Right eye: No discharge  Left eye: No discharge  Conjunctiva/sclera: Conjunctivae normal       Pupils: Pupils are equal, round, and reactive to light  Neck:      Vascular: No JVD  Trachea: No tracheal deviation  Cardiovascular:      Rate and Rhythm: Normal rate and regular rhythm  Heart sounds: Normal heart sounds  No murmur heard  No friction rub  No gallop  Pulmonary:      Effort: Pulmonary effort is normal  No respiratory distress  Breath sounds: Normal breath sounds  No stridor  No wheezing or rales  Chest:      Chest wall: No tenderness  Abdominal:      General: Bowel sounds are normal  There is no distension  Palpations: Abdomen is soft  Tenderness: There is no abdominal tenderness  There is no guarding or rebound  Musculoskeletal:         General: No tenderness or deformity  Normal range of motion  Cervical back: Normal range of motion and neck supple  Lymphadenopathy:      Cervical: No cervical adenopathy  Skin:     General: Skin is warm and dry  Findings: No rash  Neurological:      General: No focal deficit present  Mental Status: He is alert and oriented to person, place, and time  GCS: GCS eye subscore is 4  GCS verbal subscore is 5  GCS motor subscore is 6  Cranial Nerves: No cranial nerve deficit  Sensory: No sensory deficit  Motor: Motor function is intact  Coordination: Coordination normal       Comments: Not tremulous  Awake and alert  Psychiatric:         Mood and Affect: Mood normal          Speech: Speech normal          Behavior: Behavior normal          Thought Content: Thought content includes suicidal ideation           Vital Signs  ED Triage Vitals [10/06/22 0351]   Temperature Pulse Respirations Blood Pressure SpO2   97 9 °F (36 6 °C) 80 18 145/89 98 %      Temp Source Heart Rate Source Patient Position - Orthostatic VS BP Location FiO2 (%)   Temporal Monitor Lying Right arm --      Pain Score No Pain           Vitals:    10/06/22 0351 10/06/22 0415 10/06/22 0430 10/06/22 0530   BP: 145/89 145/89 122/78 120/76   Pulse: 80 93 83 76   Patient Position - Orthostatic VS: Lying            Visual Acuity      ED Medications  Medications   thiamine tablet 100 mg (has no administration in time range)   folic acid (FOLVITE) tablet 1 mg (has no administration in time range)   multivitamin-minerals (CENTRUM) tablet 1 tablet (has no administration in time range)   FLUoxetine (PROzac) capsule 20 mg (has no administration in time range)   amLODIPine (NORVASC) tablet 10 mg (has no administration in time range)   docusate sodium (COLACE) capsule 100 mg (has no administration in time range)   pantoprazole (PROTONIX) EC tablet 40 mg (has no administration in time range)   levETIRAcetam (KEPPRA) tablet 500 mg (has no administration in time range)   potassium chloride (K-DUR,KLOR-CON) CR tablet 20 mEq (20 mEq Oral Given 10/6/22 0502)       Diagnostic Studies  Results Reviewed     Procedure Component Value Units Date/Time    FLU/RSV/COVID - if FLU/RSV clinically relevant [[id number]]  (Normal) Collected: 10/06/22 0406    Lab Status: Final result Specimen: Nares from Nose Updated: 10/06/22 0452     SARS-CoV-2 Negative     INFLUENZA A PCR Negative     INFLUENZA B PCR Negative     RSV PCR Negative    Narrative:      FOR PEDIATRIC PATIENTS - copy/paste COVID Guidelines URL to browser: [email or web address] org/  [email or web address]    SARS-CoV-2 assay is a Nucleic Acid Amplification assay intended for the  qualitative detection of nucleic acid from SARS-CoV-2 in nasopharyngeal  swabs  Results are for the presumptive identification of SARS-CoV-2 RNA  Positive results are indicative of infection with SARS-CoV-2, the virus  causing COVID-19, but do not rule out bacterial infection or co-infection  with other viruses   Laboratories within the [place] and its  territories are required to report all positive results to the appropriate  public health authorities  Negative results do not preclude SARS-CoV-2  infection and should not be used as the sole basis for treatment or other  patient management decisions  Negative results must be combined with  clinical observations, patient history, and epidemiological information  This test has not been FDA cleared or approved  This test has been authorized by FDA under an Emergency Use Authorization  (EUA)  This test is only authorized for the duration of time the  declaration that circumstances exist justifying the authorization of the  emergency use of an in vitro diagnostic tests for detection of SARS-CoV-2  virus and/or diagnosis of COVID-19 infection under section 564(b)(1) of  the Act, 21 U  S C  311HIX-6(L)(5), unless the authorization is terminated  or revoked sooner  The test has been validated but independent review by FDA  and CLIA is pending  Test performed using [employer] Gene[employer]pert: This RT-PCR assay targets N2,  a region unique to SARS-CoV-2  A conserved region in the E-gene was chosen  for pan-Sarbecovirus detection which includes SARS-CoV-2  According to CMS-2020-01-R, this platform meets the definition of high-throughput technology  Rapid drug screen, urine [[id number]]  (Normal) Collected: 10/06/22 0422    Lab Status: Final result Specimen: Urine, Clean Catch Updated: 10/06/22 0450     Amph/Meth UR Negative     Barbiturate Ur Negative     Benzodiazepine Urine Negative     Cocaine Urine Negative     Methadone Urine Negative     Opiate Urine Negative     PCP Ur Negative     THC Urine Negative     Oxycodone Urine Negative    Narrative:      FOR MEDICAL PURPOSES ONLY  IF CONFIRMATION NEEDED PLEASE CONTACT THE LAB WITHIN 5 DAYS      Drug Screen Cutoff Levels:  AMPHETAMINE/METHAMPHETAMINES  1000 ng/mL  BARBITURATES     200 ng/mL  BENZODIAZEPINES     200 ng/mL  COCAINE      300 ng/mL  METHADONE      300 ng/mL  OPIATES      300 ng/mL  PHENCYCLIDINE     25 ng/mL  THC       50 ng/mL  OXYCODONE      100 ng/mL    UA w Reflex to Microscopic w Reflex to Culture [[id number]]  (Abnormal) Collected: 10/06/22 0422    Lab Status: Final result Specimen: Urine, Clean Catch Updated: 10/06/22 0448     Color, UA Light Yellow     Clarity, UA Clear     Specific Gravity, UA <1 005     pH, UA 5 5     Leukocytes, UA Negative     Nitrite, UA Negative     Protein, UA Negative mg/dl      Glucose, UA Negative mg/dl      Ketones, UA Negative mg/dl      Urobilinogen, UA 2 0 mg/dl      Bilirubin, UA Negative     Occult Blood, UA Negative    TSH [[id number]]  (Normal) Collected: 10/06/22 0406    Lab Status: Final result Specimen: Blood from Arm, Left Updated: 10/06/22 0441     TSH 3RD GENERATON 1 675 uIU/mL     Narrative:      Patients undergoing fluorescein dye angiography may retain small amounts of fluorescein in the body for 48-72 hours post procedure  Samples containing fluorescein can produce falsely depressed TSH values  If the patient had this procedure,a specimen should be resubmitted post fluorescein clearance        Protime-INR [[id number]]  (Normal) Collected: 10/06/22 0406    Lab Status: Final result Specimen: Blood from Arm, Left Updated: 10/06/22 0433     Protime 13 7 seconds      INR 0 98    APTT [[id number]]  (Normal) Collected: 10/06/22 0406    Lab Status: Final result Specimen: Blood from Arm, Left Updated: 10/06/22 0433     PTT 26 seconds     Comprehensive metabolic panel [[id number]]  (Abnormal) Collected: 10/06/22 0406    Lab Status: Final result Specimen: Blood from Arm, Left Updated: 10/06/22 0433     Sodium 131 mmol/L      Potassium 3 4 mmol/L      Chloride 95 mmol/L      CO2 24 mmol/L      ANION GAP 12 mmol/L      BUN 9 mg/dL      Creatinine 1 22 mg/dL      Glucose 96 mg/dL      Calcium 8 6 mg/dL      AST 32 U/L      ALT 25 U/L      Alkaline Phosphatase 105 U/L      Total Protein 7 4 g/dL      Albumin 3 6 g/dL      Total Bilirubin 1 20 mg/dL eGFR 61 ml/min/1 73sq m     Narrative:      [place] guidelines for Chronic Kidney Disease (CKD):     Stage 1 with normal or high GFR (GFR > 90 mL/min/1 73 square meters)    Stage 2 Mild CKD (GFR = 60-89 mL/min/1 73 square meters)    Stage 3A Moderate CKD (GFR = 45-59 mL/min/1 73 square meters)    Stage 3B Moderate CKD (GFR = 30-44 mL/min/1 73 square meters)    Stage 4 Severe CKD (GFR = 15-29 mL/min/1 73 square meters)    Stage 5 End Stage CKD (GFR <15 mL/min/1 73 square meters)  Note: GFR calculation is accurate only with a steady state creatinine    Magnesium [[id number]]  (Normal) Collected: 10/06/22 0406    Lab Status: Final result Specimen: Blood from Arm, Left Updated: 10/06/22 0427     Magnesium 2 0 mg/dL     CBC and differential [[id number]] Collected: 10/06/22 0406    Lab Status: Final result Specimen: Blood from Arm, Left Updated: 10/06/22 0418     WBC 7 52 Thousand/uL      RBC 4 44 Million/uL      Hemoglobin 14 1 g/dL      Hematocrit 40 0 %      MCV 90 fL      MCH 31 8 pg      MCHC 35 3 g/dL      RDW 13 4 %      MPV 9 4 fL      Platelets 409 Thousands/uL      nRBC 0 /100 WBCs      Neutrophils Relative 43 %      Immat GRANS % 0 %      Lymphocytes Relative 43 %      Monocytes Relative 9 %      Eosinophils Relative 4 %      Basophils Relative 1 %      Neutrophils Absolute 3 23 Thousands/µL      Immature Grans Absolute 0 01 Thousand/uL      Lymphocytes Absolute 3 33 Thousands/µL      Monocytes Absolute 0 64 Thousand/µL      Eosinophils Absolute 0 26 Thousand/µL      Basophils Absolute 0 05 Thousands/µL     POCT alcohol breath test [[id number]]  (Normal) Resulted: 10/06/22 0353    Lab Status: Final result Updated: 10/06/22 0353     EXTBreath Alcohol 0 22                 No orders to display              Procedures  ECG 12 Lead Documentation Only    Date/Time: 10/6/2022 4:26 AM  Performed by: Daniel Hamilton MD  Authorized by: Daniel Hamilton MD     Indications / Diagnosis:  Psych  ECG reviewed by me, the ED Provider: yes    Patient location:  ED  Previous ECG:     Previous ECG:  Compared to current    Similarity:  No change    Comparison to cardiac monitor: Yes    Interpretation:     Interpretation: non-specific    Rate:     ECG rate:  80    ECG rate assessment: normal    Rhythm:     Rhythm: sinus rhythm    Ectopy:     Ectopy: none    QRS:     QRS axis:  Left    QRS intervals:  Normal  Conduction:     Conduction: normal    ST segments:     ST segments:  Normal  T waves:     T waves: non-specific               ED Course  ED Course as of 10/06/22 0627   Thu Oct 06, 2022   0447 Pt intoxicated, suicidal with no plan  Will need reeval when sober for crisis  Regular medications ordered, CIWA ordered though pt denies withdrawal in past     1014 S/o to Dr Manuel Hull at end of shift  SBIRT 20yo+    Flowsheet Row Most Recent Value   SBIRT (23 yo +)    In order to provide better care to our patients, we are screening all of our patients for alcohol and drug use  Would it be okay to ask you these screening questions? Yes Filed at: 10/06/2022 0430   Initial Alcohol Screen: US AUDIT-C     1  How often do you have a drink containing alcohol? 6 Filed at: 10/06/2022 0430   2  How many drinks containing alcohol do you have on a typical day you are drinking? 6 Filed at: 10/06/2022 0430   3a  Male UNDER 65: How often do you have five or more drinks on one occasion? 6 Filed at: 10/06/2022 0430   3b  FEMALE Any Age, or MALE 65+: How often do you have 4 or more drinks on one occassion? 0 Filed at: 10/06/2022 0430   Audit-C Score 18 Filed at: 10/06/2022 0430   Full Alcohol Screen: US AUDIT    4  How often during the last year have you found that you were not able to stop drinking once you had started? 4 Filed at: 10/06/2022 0430   5  How often during past year have you failed to do what was normally expected of you because of drinking?   4 Filed at: 10/06/2022 0430 6  How often in past year have you needed a first drink in the morning to get yourself going after a heavy drinking session? 4 Filed at: 10/06/2022 0430   7  How often in past year have you had feeling of guilt or remorse after drinking? 1 Filed at: 10/06/2022 0430   8  How often in past year have you been unable to remember what happened night before because you had been drinking? 3 Filed at: 10/06/2022 0430   9  Have you or someone else been injured as a result of your drinking? 0 Filed at: 10/06/2022 0430   10  Has a relative, friend, doctor or other health worker been concerned about your drinking and suggested you cut down? 4 Filed at: 10/06/2022 0430   AUDIT Total Score 38 Filed at: 10/06/2022 0430   GERARD: How many times in the past year have you    Used an illegal drug or used a prescription medication for non-medical reasons?  Never Filed at: 10/06/2022 0430                    MDM  Number of Diagnoses or Management Options  Alcohol intoxication ([facility] ): new and requires workup  Suicidal ideation: new and requires workup     Amount and/or Complexity of Data Reviewed  Clinical lab tests: ordered and reviewed  Review and summarize past medical records: yes    Risk of Complications, Morbidity, and/or Mortality  Presenting problems: low  Diagnostic procedures: low  Management options: low    Patient Progress  Patient progress: stable      Disposition  Final diagnoses:   Alcohol intoxication ([facility] )   Suicidal ideation     Time reflects when diagnosis was documented in both MDM as applicable and the Disposition within this note     Time User Action Codes Description Comment    10/6/2022  4:46 AM Sunitha RUEDA Add [F10 929] Alcohol intoxication ([facility] [address] )     10/6/2022  4:46 AM Vee Rizzo Add [J30 915] Suicidal ideation       ED Disposition     ED Disposition   Transfer to [address] [place] [address]   --    Date/Time   Thu Oct 6, 2022  5:49 AM    Comment   Bushra Carlos should be transferred out to [facility] and has been medically cleared  Follow-up Information    None         Patient's Medications   Discharge Prescriptions    No medications on file       No discharge procedures on file      PDMP Review     None          ED Provider  Electronically Signed by           Bang Rashid MD  10/06/22 4408

## 2022-10-06 NOTE — ED NOTES
This writer discussed the patients current presentation and recommended discharge plan with Gonzalo Madison  They agree with the patient being discharged at this time with referrals and/or information about currently denying resources for mental health and drug and alcohol  The patient was Instructed to follow up with their Primary care  The patient was provided with referral information for:   currently denying resources for mental health and drug and alcohol  This writer and the patient completed a safety plan  The patient was provided with a copy of their safety plan with encouragement to utilize the plan following discharge  In addition, the patient was instructed to call [facility] crisis, other crisis services, [facility] or to go to the nearest ER immediately if their situation changes at any time  This writer discussed discharge plans with the patient who agrees with and understands the discharge plans  SAFETY PLAN  Warning Signs (thoughts, images, mood, behavior, situations) of a potential crisis: Housing and gossip there      Coping Skills (what can I do to take my mind off the problem, or to keep myself safe): reading and watching crime shows  Outside Support (who can I reach out to for support and help): Friends at [facility] Suicide Prevention Hotline:  Jamshid [address] [place] [address] [phone] - [place] Crisis/Mobile Crisis   [address]: [phone]  [facility]: [address] [phone] - Crisis   [phone] - Peer Support Talk Line (1-9pm daily)  [phone] - Teen Support Talk Line (1-9pm daily)  [address] [place] ([place]) [phone] - [address] [facility]

## 2022-10-06 NOTE — DISCHARGE INSTRUCTIONS
This writer discussed the patients current presentation and recommended discharge plan with Lior Cam  They agree with the patient being discharged at this time with referrals and/or information about currently denying resources for mental health and drug and alcohol  The patient was Instructed to follow up with their Primary care  The patient was provided with referral information for:   currently denying resources for mental health and drug and alcohol  This writer and the patient completed a safety plan  The patient was provided with a copy of their safety plan with encouragement to utilize the plan following discharge  In addition, the patient was instructed to call [facility] crisis, other crisis services, [facility] or to go to the nearest ER immediately if their situation changes at any time  This writer discussed discharge plans with the patient who agrees with and understands the discharge plans  SAFETY PLAN  Warning Signs (thoughts, images, mood, behavior, situations) of a potential crisis: Housing and gossip there      Coping Skills (what can I do to take my mind off the problem, or to keep myself safe): reading and watching crime shows  Outside Support (who can I reach out to for support and help): Friends at [facility] Suicide Prevention Hotline:  Jamshid [address] [place] [address] [phone] - [place] Crisis/Mobile Crisis   [address]: [phone]  [facility]: [address] [phone] - Crisis   [phone] - Peer Support Talk Line (1-9pm daily)  [phone] - Teen Support Talk Line (1-9pm daily)  [address] [place] ([address] [place] [address]) [phone] - 2696 [facility]

## 2022-10-06 NOTE — ED NOTES
Crisis met with pt in his room  Pt reported thoughts of suicide without plan and reported that he was initially unsure of his stressors  Pt [facility] and that the gossip there from the older women is a stressor  Pt reported that he is now retired and used to be a " "  Pt reported that he is currently on a waitlist for therapy and is unsure of what the place is called  It appears that pt is referred to [facility] from their clinical system  Pt reported that he is prescribed mental health medications from his primary care  Pt reported no medical issues  Pt denies legal issues  Pt reported drinking daily about "half of a fifth" of liquor  Pt reported that he does not smoke cigarettes  Pt reported suicidal ideation without plan and would never attempt due to not liking pain  Pt no history of attempts or self injurious behaviors  Pt denies homicidal ideation  Pt denies delusions or hallucinations  Pt is denying assistance with mental health referrals or drug and alcohol referrals

## 2022-10-06 NOTE — ED NOTES
[facility] contacted to set up Lyft ride back to pt's apartment  Will call back with pickup time when available        Claudia Wallis RN  10/06/22 1332

## 2022-10-06 NOTE — ED NOTES
Security states they have no processions for patient  Home medications returned from pharmacy and given back to patient prior to discharge        Valorie Abreu RN  10/06/22 7599

## 2022-10-07 ENCOUNTER — DOCUMENTATION (OUTPATIENT)
Dept: PSYCHIATRY | Facility: CLINIC | Age: 67
End: 2022-10-07

## 2022-10-08 LAB
ATRIAL RATE: 80 BPM
P AXIS: 49 DEGREES
PR INTERVAL: 164 MS
QRS AXIS: -26 DEGREES
QRSD INTERVAL: 66 MS
QT INTERVAL: 384 MS
QTC INTERVAL: 442 MS
T WAVE AXIS: 55 DEGREES
VENTRICULAR RATE: 80 BPM

## 2022-10-08 PROCEDURE — 93010 ELECTROCARDIOGRAM REPORT: CPT | Performed by: INTERNAL MEDICINE

## 2022-10-15 ENCOUNTER — HOSPITAL ENCOUNTER (EMERGENCY)
Facility: HOSPITAL | Age: 67
Discharge: HOME | End: 2022-10-15
Attending: EMERGENCY MEDICINE
Payer: MEDICARE

## 2022-10-15 ENCOUNTER — APPOINTMENT (EMERGENCY)
Dept: CT IMAGING | Facility: HOSPITAL | Age: 67
End: 2022-10-15
Payer: MEDICARE

## 2022-10-15 VITALS
BODY MASS INDEX: 28.03 KG/M2 | HEIGHT: 68 IN | TEMPERATURE: 98.4 F | RESPIRATION RATE: 16 BRPM | HEART RATE: 113 BPM | SYSTOLIC BLOOD PRESSURE: 150 MMHG | WEIGHT: 184.97 LBS | DIASTOLIC BLOOD PRESSURE: 70 MMHG | OXYGEN SATURATION: 98 %

## 2022-10-15 DIAGNOSIS — F10.929 ALCOHOL INTOXICATION (HCC): ICD-10-CM

## 2022-10-15 DIAGNOSIS — R79.89 ELEVATED SERUM CREATININE: ICD-10-CM
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
## 2022-10-15 LAB
4HR DELTA HS TROPONIN: 1 NG/L
ABO GROUP BLD: NORMAL
ALBUMIN SERPL BCP-MCNC: 3.5 G/DL (ref 3.5–5)
ALP SERPL-CCNC: 102 U/L (ref 46–116)
ALT SERPL W P-5'-P-CCNC: 24 U/L (ref 12–78)
ANION GAP SERPL CALCULATED.3IONS-SCNC: 10 MMOL/L (ref 4–13)
AST SERPL W P-5'-P-CCNC: 19 U/L (ref 5–45)
ATRIAL RATE: 70 BPM
BASOPHILS # BLD AUTO: 0.06 THOUSANDS/ΜL (ref 0–0.1)
BASOPHILS NFR BLD AUTO: 1 % (ref 0–1)
BILIRUB DIRECT SERPL-MCNC: 0.24 MG/DL (ref 0–0.2)
BILIRUB SERPL-MCNC: 0.7 MG/DL (ref 0.2–1)
BLD GP AB SCN SERPL QL: NEGATIVE
BUN SERPL-MCNC: 9 MG/DL (ref 5–25)
CALCIUM SERPL-MCNC: 8.5 MG/DL (ref 8.3–10.1)
CARDIAC TROPONIN I PNL SERPL HS: 6 NG/L
CARDIAC TROPONIN I PNL SERPL HS: 7 NG/L
CHLORIDE SERPL-SCNC: 103 MMOL/L (ref 96–108)
CO2 SERPL-SCNC: 26 MMOL/L (ref 21–32)
CREAT SERPL-MCNC: 1.32 MG/DL (ref 0.6–1.3)
EOSINOPHIL # BLD AUTO: 0.33 THOUSAND/ΜL (ref 0–0.61)
EOSINOPHIL NFR BLD AUTO: 6 % (ref 0–6)
ERYTHROCYTE [DISTWIDTH] IN BLOOD BY AUTOMATED COUNT: 14.5 % (ref 11.6–15.1)
ETHANOL EXG-MCNC: 0.08 MG/DL
ETHANOL EXG-MCNC: 0.1 MG/DL
ETHANOL EXG-MCNC: 0.12 MG/DL
ETHANOL SERPL-MCNC: 280 MG/DL (ref 0–3)
GFR SERPL CREATININE-BSD FRML MDRD: 55 ML/MIN/1.73SQ M
GLUCOSE SERPL-MCNC: 114 MG/DL (ref 65–140)
HCT VFR BLD AUTO: 39.6 % (ref 36.5–49.3)
HGB BLD-MCNC: 13.6 G/DL (ref 12–17)
IMM GRANULOCYTES # BLD AUTO: 0.01 THOUSAND/UL (ref 0–0.2)
IMM GRANULOCYTES NFR BLD AUTO: 0 % (ref 0–2)
LYMPHOCYTES # BLD AUTO: 3.27 THOUSANDS/ΜL (ref 0.6–4.47)
LYMPHOCYTES NFR BLD AUTO: 59 % (ref 14–44)
MCH RBC QN AUTO: 32.9 PG (ref 26.8–34.3)
MCHC RBC AUTO-ENTMCNC: 34.3 G/DL (ref 31.4–37.4)
MCV RBC AUTO: 96 FL (ref 82–98)
MONOCYTES # BLD AUTO: 0.42 THOUSAND/ΜL (ref 0.17–1.22)
MONOCYTES NFR BLD AUTO: 8 % (ref 4–12)
NEUTROPHILS # BLD AUTO: 1.45 THOUSANDS/ΜL (ref 1.85–7.62)
NEUTS SEG NFR BLD AUTO: 26 % (ref 43–75)
NRBC BLD AUTO-RTO: 0 /100 WBCS
P AXIS: 64 DEGREES
PLATELET # BLD AUTO: 242 THOUSANDS/UL (ref 149–390)
PMV BLD AUTO: 9.4 FL (ref 8.9–12.7)
POTASSIUM SERPL-SCNC: 3.6 MMOL/L (ref 3.5–5.3)
PR INTERVAL: 176 MS
PROT SERPL-MCNC: 7.3 G/DL (ref 6.4–8.4)
QRS AXIS: -27 DEGREES
QRSD INTERVAL: 72 MS
QT INTERVAL: 410 MS
QTC INTERVAL: 442 MS
RBC # BLD AUTO: 4.14 MILLION/UL (ref 3.88–5.62)
RH BLD: POSITIVE
SODIUM SERPL-SCNC: 139 MMOL/L (ref 135–147)
SPECIMEN EXPIRATION DATE: NORMAL
T WAVE AXIS: 43 DEGREES
VENTRICULAR RATE: 70 BPM
WBC # BLD AUTO: 5.54 THOUSAND/UL (ref 4.31–10.16)

## 2022-10-15 PROCEDURE — 86850 RBC ANTIBODY SCREEN: CPT | Performed by: EMERGENCY MEDICINE

## 2022-10-15 PROCEDURE — 99284 EMERGENCY DEPT VISIT MOD MDM: CPT | Performed by: EMERGENCY MEDICINE

## 2022-10-15 PROCEDURE — 86900 BLOOD TYPING SEROLOGIC ABO: CPT | Performed by: EMERGENCY MEDICINE

## 2022-10-15 PROCEDURE — 93005 ELECTROCARDIOGRAM TRACING: CPT

## 2022-10-15 PROCEDURE — 86901 BLOOD TYPING SEROLOGIC RH(D): CPT | Performed by: EMERGENCY MEDICINE

## 2022-10-15 PROCEDURE — 36415 COLL VENOUS BLD VENIPUNCTURE: CPT | Performed by: EMERGENCY MEDICINE

## 2022-10-15 PROCEDURE — 80076 HEPATIC FUNCTION PANEL: CPT | Performed by: EMERGENCY MEDICINE

## 2022-10-15 PROCEDURE — 70450 CT HEAD/BRAIN W/O DYE: CPT

## 2022-10-15 PROCEDURE — 84484 ASSAY OF TROPONIN QUANT: CPT | Performed by: EMERGENCY MEDICINE

## 2022-10-15 PROCEDURE — 80048 BASIC METABOLIC PNL TOTAL CA: CPT | Performed by: EMERGENCY MEDICINE

## 2022-10-15 PROCEDURE — 82075 ASSAY OF BREATH ETHANOL: CPT | Performed by: EMERGENCY MEDICINE

## 2022-10-15 PROCEDURE — 96361 HYDRATE IV INFUSION ADD-ON: CPT

## 2022-10-15 PROCEDURE — 99284 EMERGENCY DEPT VISIT MOD MDM: CPT

## 2022-10-15 PROCEDURE — 93010 ELECTROCARDIOGRAM REPORT: CPT | Performed by: INTERNAL MEDICINE

## 2022-10-15 PROCEDURE — 82077 ASSAY SPEC XCP UR&BREATH IA: CPT | Performed by: EMERGENCY MEDICINE

## 2022-10-15 PROCEDURE — 96360 HYDRATION IV INFUSION INIT: CPT

## 2022-10-15 PROCEDURE — 85025 COMPLETE CBC W/AUTO DIFF WBC: CPT | Performed by: EMERGENCY MEDICINE

## 2022-10-15 RX ADMIN — SODIUM CHLORIDE 1000 ML: 0.9 INJECTION, SOLUTION INTRAVENOUS at 05:21

## 2022-10-15 NOTE — ED PROVIDER NOTES
Emergency Department Trauma Note  Herminia Tidwell 78 y o  male MRN: [id number]  Unit/Bed#: ED 06/ED 06 Encounter: [id number]      Trauma Alert: Trauma Acuity: Trauma Evaluation  Model of Arrival: Mode of Arrival: BLS via Trauma Squad Name and Number: [id number]  Trauma Team: Current Providers  Attending Provider: Basia Luna DO  Registered Nurse: Janel Xavier, RN  Registered Nurse: Helena Ang RN  Consultants:     None      History of Present Illness     Chief Complaint:   Chief Complaint   Patient presents with   • Fall     EMS from [facility]; drinking Gin and states he fell and hit head     HPI:  Herminia Tidwell is a 78 y o  male who presents with fall  Mechanism:Details of Incident: pt was drinking and fell onto floor hitting head, no LOC Injury Date: 10/15/22 Injury Time: 0415 Injury Occurence Location - [address] [place] [address] Way: [address]    61-year-old male, past medical history of depression, previous seizures on Keppra, alcohol abuse, GERD, hypertension presents for evaluation of fall with head strike with no specific complaints, patient does admit to drinking every day states that he would like to get help with his drinking however “not until after the holidays" does not want to go into rehab at this time  He states that his doctor told him to get evaluated pre time he falls so he called for ambulance transport to the emergency department  No specific complaints currently, does have some slurred speech but answering questions appropriately  No external evidence of trauma, no chest pain or shortness of breath        Review of Systems   Constitutional: Negative for appetite change, chills and fever  HENT: Negative for rhinorrhea and sore throat  Eyes: Negative for photophobia and visual disturbance  Respiratory: Negative for cough and shortness of breath  Cardiovascular: Negative for chest pain and palpitations  Gastrointestinal: Negative for abdominal pain and diarrhea     Genitourinary: Negative for dysuria, frequency and urgency  Skin: Negative for rash  Neurological: Negative for dizziness and weakness  All other systems reviewed and are negative  Historical Information     Immunizations: There is no immunization history on file for this patient  Past Medical History:   Diagnosis Date   • Alcohol abuse    • Anxiety    • Cancer ([address] )     kidney   • Depression    • GERD (gastroesophageal reflux disease)    • Hypertension        Family History   Problem Relation Age of Onset   • Colon cancer Neg Hx    • Colon polyps Neg Hx      Past Surgical History:   Procedure Laterality Date   • NEPHRECTOMY Left    • UMBILICAL HERNIA REPAIR       Social History     Tobacco Use   • Smoking status: Never Smoker   • Smokeless tobacco: Never Used   Vaping Use   • Vaping Use: Never used   Substance Use Topics   • Alcohol use: Yes     Comment: a gallon of gin a week   • Drug use: Never     E-Cigarette/Vaping   • E-Cigarette Use Never User      E-Cigarette/Vaping Substances   • Nicotine No    • THC No    • CBD No    • Flavoring No    • Other No    • Unknown No        Family History: non-contributory    Meds/Allergies   Prior to Admission Medications   Prescriptions Last Dose Informant Patient Reported? Taking?    FLUoxetine (PROzac) 20 mg capsule   Yes No   Sig: Take 20 mg by mouth every morning   amLODIPine (NORVASC) 10 mg tablet  Self Yes No   docusate sodium (COLACE) 100 mg capsule  Self No No   Sig: Take 1 capsule (100 mg total) by mouth every 12 (twelve) hours   levETIRAcetam (KEPPRA) 500 mg tablet   Yes No   Sig: Take 500 mg by mouth 2 (two) times a day   pantoprazole (PROTONIX) 40 mg tablet  Self Yes No      Facility-Administered Medications: None       No Known Allergies    PHYSICAL EXAM    PE limited by: none    Objective   Vitals:   First set: Temperature: 98 4 °F (36 9 °C) (10/15/22 0440)  Pulse: 74 (10/15/22 0440)  Respirations: 18 (10/15/22 0440)  Blood Pressure: 161/79 (10/15/22 0440)  SpO2: 100 % (10/15/22 4010)    Primary Survey:   (A) Airway: intact  (B) Breathing: cta b/l  (C) Circulation: Pulses:   normal  (D) Disabliity:  GCS Total:  15  (E) Expose:  Completed    Secondary Survey: (Click on Physical Exam tab above)  Physical Exam  Vitals and nursing note reviewed  Constitutional:       Appearance: He is well-developed  HENT:      Head: Normocephalic and atraumatic  Right Ear: External ear normal       Left Ear: External ear normal    Eyes:      Conjunctiva/sclera: Conjunctivae normal       Pupils: Pupils are equal, round, and reactive to light  Neck:      Vascular: No JVD  Trachea: No tracheal deviation  Cardiovascular:      Rate and Rhythm: Normal rate and regular rhythm  Heart sounds: Normal heart sounds  No murmur heard  No friction rub  No gallop  Pulmonary:      Effort: Pulmonary effort is normal  No respiratory distress  Breath sounds: No stridor  No wheezing or rales  Abdominal:      General: There is no distension  Palpations: Abdomen is soft  There is no mass  Tenderness: There is no abdominal tenderness  There is no guarding or rebound  Musculoskeletal:         General: Normal range of motion  Cervical back: Normal range of motion and neck supple  No rigidity or tenderness  Skin:     General: Skin is warm and dry  Coloration: Skin is not pale  Findings: No erythema or rash  Neurological:      General: No focal deficit present  Mental Status: He is alert and oriented to person, place, and time  Mental status is at baseline  Cranial Nerves: No cranial nerve deficit  Cervical spine cleared by clinical criteria? Yes   Breath sounds equal  No crepitus or chest wall tenderness with compression over the chest  No pain or instability with compression over the pelvis  No bedside imaging required  Patient is stable to proceed to CT scan        Invasive Devices  Report    None                 Lab Results:   Results Reviewed     Procedure Component Value Units Date/Time    POCT alcohol breath test [[id number]]  (Normal) Resulted: 10/15/22 1527    Lab Status: Final result Updated: 10/15/22 1527     EXTBreath Alcohol 0 078    POCT alcohol breath test [[id number]]  (Normal) Resulted: 10/15/22 1402    Lab Status: Final result Updated: 10/15/22 1403     EXTBreath Alcohol 0 102    POCT alcohol breath test [[id number]]  (Normal) Resulted: 10/15/22 1130    Lab Status: Final result Updated: 10/15/22 1239     EXTBreath Alcohol 0 122    HS Troponin I 4hr [[id number]]  (Normal) Collected: 10/15/22 0909    Lab Status: Final result Specimen: Blood from Arm, Right Updated: 10/15/22 0946     hs TnI 4hr 7 ng/L      Delta 4hr hsTnI 1 ng/L     HS Troponin 0hr (reflex protocol) [[id number]]  (Normal) Collected: 10/15/22 0445    Lab Status: Final result Specimen: Blood from Arm, Right Updated: 10/15/22 0517     hs TnI 0hr 6 ng/L     Basic metabolic panel [[id number]]  (Abnormal) Collected: 10/15/22 0445    Lab Status: Final result Specimen: Blood from Arm, Right Updated: 10/15/22 0510     Sodium 139 mmol/L      Potassium 3 6 mmol/L      Chloride 103 mmol/L      CO2 26 mmol/L      ANION GAP 10 mmol/L      BUN 9 mg/dL      Creatinine 1 32 mg/dL      Glucose 114 mg/dL      Calcium 8 5 mg/dL      eGFR 55 ml/min/1 73sq m     Narrative:      [facility] guidelines for Chronic Kidney Disease (CKD):   •  Stage 1 with normal or high GFR (GFR > 90 mL/min/1 73 square meters)  •  Stage 2 Mild CKD (GFR = 60-89 mL/min/1 73 square meters)  •  Stage 3A Moderate CKD (GFR = 45-59 mL/min/1 73 square meters)  •  Stage 3B Moderate CKD (GFR = 30-44 mL/min/1 73 square meters)  •  Stage 4 Severe CKD (GFR = 15-29 mL/min/1 73 square meters)  •  Stage 5 End Stage CKD (GFR <15 mL/min/1 73 square meters)  Note: GFR calculation is accurate only with a steady state creatinine    Hepatic function panel [[id number]]  (Abnormal) Collected: 10/15/22 0445    Lab Status: Final result Specimen: Blood from Arm, Right Updated: 10/15/22 0510     Total Bilirubin 0 70 mg/dL      Bilirubin, Direct 0 24 mg/dL      Alkaline Phosphatase 102 U/L      AST 19 U/L      ALT 24 U/L      Total Protein 7 3 g/dL      Albumin 3 5 g/dL     Ethanol [[id number]]  (Abnormal) Collected: 10/15/22 0445    Lab Status: Final result Specimen: Blood from Arm, Right Updated: 10/15/22 0505     Ethanol Lvl 280 mg/dL     CBC and differential [[id number]]  (Abnormal) Collected: 10/15/22 0445    Lab Status: Final result Specimen: Blood from Arm, Right Updated: 10/15/22 0454     WBC 5 54 Thousand/uL      RBC 4 14 Million/uL      Hemoglobin 13 6 g/dL      Hematocrit 39 6 %      MCV 96 fL      MCH 32 9 pg      MCHC 34 3 g/dL      RDW 14 5 %      MPV 9 4 fL      Platelets 951 Thousands/uL      nRBC 0 /100 WBCs      Neutrophils Relative 26 %      Immat GRANS % 0 %      Lymphocytes Relative 59 %      Monocytes Relative 8 %      Eosinophils Relative 6 %      Basophils Relative 1 %      Neutrophils Absolute 1 45 Thousands/µL      Immature Grans Absolute 0 01 Thousand/uL      Lymphocytes Absolute 3 27 Thousands/µL      Monocytes Absolute 0 42 Thousand/µL      Eosinophils Absolute 0 33 Thousand/µL      Basophils Absolute 0 06 Thousands/µL                  Imaging Studies:   Direct to CT: No  TRAUMA - CT head wo contrast   Final Result by Madeline Bonilla MD (10/15 0545)      No acute intracranial abnormality  The study was marked in [facility] for immediate notification              Workstation performed: [id number]               Procedures  Procedures         ED Course  ED Course as of 10/15/22 2207   Sat Oct 15, 2022   0864 Procedure Note: EKG  Date/Time: 10/15/22 4:50 AM   Performed by: Matilde Starr  Authorized by: Matilde Starr  Indications / Diagnosis: Fall  ECG reviewed by me, the ED Provider: yes   The EKG demonstrates:  Rhythm: normal sinus  Intervals: normal intervals  Axis: normal axis  QRS/Blocks:normal QRS  ST Changes: No acute ST Changes, no STD/EMELIA        0513 MEDICAL ALCOHOL(!): 280   0514 Creatinine(!): 1 32           MDM  Number of Diagnoses or Management Options  Alcohol intoxication ([facility] [address] ): new and requires workup  Elevated serum creatinine: new and requires workup  Fall, initial encounter: new and requires workup  Diagnosis management comments: 44-year-old male history of alcohol abuse presents for evaluation of status post fall obtain CT scan, lab work will re-evaluate       Amount and/or Complexity of Data Reviewed  Clinical lab tests: reviewed and ordered  Tests in the radiology section of CPT®: ordered and reviewed  Tests in the medicine section of CPT®: ordered and reviewed  Independent visualization of images, tracings, or specimens: yes            Disposition  [facility] Transfer: No  Final diagnoses:   Fall, initial encounter   Alcohol intoxication ([facility] )   Elevated serum creatinine     Time reflects when diagnosis was documented in both MDM as applicable and the Disposition within this note     Time User Action Codes Description Comment    10/15/2022  5:12 AM Roanna Pat Add [R93  AEEP] Fall, initial encounter     10/15/2022  5:12 AM Roanna Pat Add [F10 929] Alcohol intoxication ([facility] )     10/15/2022  5:12 AM Roanna Pat Add [R79 89] Elevated serum creatinine       ED Disposition     ED Disposition   Discharge    Condition   Stable    Date/Time   Sat Oct 15, 2022  3:28 PM    [place] discharge to home/self care  Follow-up Information     Follow up With Specialties Details Why Contact Info Additional Information    Chuyita Ashley MD Internal Medicine Schedule an appointment as soon as possible for a visit  As needed [address] Emergency Department Emergency Medicine  If symptoms worsen [address] [address]  [phone] [address] [place] [address] [facility] Emergency Department, [address] [facility] [address], [facility], [address]        Discharge Medication List as of 10/15/2022  3:28 PM      CONTINUE these medications which have NOT CHANGED    Details   amLODIPine (NORVASC) 10 mg tablet Historical Med      docusate sodium (COLACE) 100 mg capsule Take 1 capsule (100 mg total) by mouth every 12 (twelve) hours, Starting Fri 10/29/2021, Normal      FLUoxetine (PROzac) 20 mg capsule Take 20 mg by mouth every morning, Starting Wed 6/15/2022, Historical Med      levETIRAcetam (KEPPRA) 500 mg tablet Take 500 mg by mouth 2 (two) times a day, Starting Mon 7/11/2022, Historical Med      pantoprazole (PROTONIX) 40 mg tablet Historical Med           No discharge procedures on file      PDMP Review     None          ED Provider  Electronically Signed by         Kassandra Duenas DO  10/15/22 2620 Stephen Willams DO  10/15/22 0648

## 2022-10-24 ENCOUNTER — APPOINTMENT (EMERGENCY)
Dept: RADIOLOGY | Facility: HOSPITAL | Age: 67
End: 2022-10-24
Payer: MEDICARE

## 2022-10-24 ENCOUNTER — APPOINTMENT (EMERGENCY)
Dept: CT IMAGING | Facility: HOSPITAL | Age: 67
End: 2022-10-24
Payer: MEDICARE

## 2022-10-24 ENCOUNTER — HOSPITAL ENCOUNTER (INPATIENT)
Facility: HOSPITAL | Age: 67
LOS: 1 days | Discharge: HOME HEALTH | End: 2022-10-26
Attending: EMERGENCY MEDICINE | Admitting: HOSPITALIST
Payer: MEDICARE

## 2022-10-24 DIAGNOSIS — R26.2 AMBULATORY DYSFUNCTION: ICD-10-CM

## 2022-10-24 DIAGNOSIS — S01.81XA LACERATION OF FACE: ICD-10-CM

## 2022-10-24 DIAGNOSIS — I10 PRIMARY HYPERTENSION: ICD-10-CM

## 2022-10-24 DIAGNOSIS — S01.81XA FACIAL LACERATION, INITIAL ENCOUNTER: Primary | ICD-10-CM
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
## 2022-10-24 DIAGNOSIS — F10.10 ALCOHOL ABUSE: ICD-10-CM

## 2022-10-24 PROBLEM — R82.2 BILIRUBIN IN URINE: Status: ACTIVE | Noted: 2022-10-24

## 2022-10-24 PROBLEM — N18.30 STAGE 3 CHRONIC KIDNEY DISEASE (HCC): Status: ACTIVE | Noted: 2022-10-24

## 2022-10-24 PROBLEM — E87.1 HYPONATREMIA: Status: ACTIVE | Noted: 2022-10-24

## 2022-10-24 PROBLEM — Z87.898 HISTORY OF SEIZURE: Status: ACTIVE | Noted: 2022-10-24

## 2022-10-24 LAB
2HR DELTA HS TROPONIN: 0 NG/L
4HR DELTA HS TROPONIN: -2 NG/L
ALBUMIN SERPL BCP-MCNC: 3.7 G/DL (ref 3.5–5)
ALP SERPL-CCNC: 113 U/L (ref 46–116)
ALT SERPL W P-5'-P-CCNC: 67 U/L (ref 12–78)
ANION GAP SERPL CALCULATED.3IONS-SCNC: 6 MMOL/L (ref 4–13)
APTT PPP: 25 SECONDS (ref 23–37)
AST SERPL W P-5'-P-CCNC: 75 U/L (ref 5–45)
ATRIAL RATE: 84 BPM
BACTERIA UR QL AUTO: ABNORMAL /HPF
BASOPHILS # BLD AUTO: 0.05 THOUSANDS/ÂΜL (ref 0–0.1)
BASOPHILS NFR BLD AUTO: 1 % (ref 0–1)
BILIRUB DIRECT SERPL-MCNC: 0.4 MG/DL (ref 0–0.2)
BILIRUB SERPL-MCNC: 2.4 MG/DL (ref 0.2–1)
BILIRUB UR QL STRIP: ABNORMAL
BUN SERPL-MCNC: 21 MG/DL (ref 5–25)
CALCIUM SERPL-MCNC: 9 MG/DL (ref 8.3–10.1)
CARDIAC TROPONIN I PNL SERPL HS: 7 NG/L
CARDIAC TROPONIN I PNL SERPL HS: 9 NG/L
CARDIAC TROPONIN I PNL SERPL HS: 9 NG/L
CHLORIDE SERPL-SCNC: 99 MMOL/L (ref 96–108)
CLARITY UR: CLEAR
CO2 SERPL-SCNC: 28 MMOL/L (ref 21–32)
COLOR UR: ABNORMAL
CREAT SERPL-MCNC: 1.32 MG/DL (ref 0.6–1.3)
EOSINOPHIL # BLD AUTO: 0.27 THOUSAND/ÂΜL (ref 0–0.61)
EOSINOPHIL NFR BLD AUTO: 3 % (ref 0–6)
ERYTHROCYTE [DISTWIDTH] IN BLOOD BY AUTOMATED COUNT: 14.9 % (ref 11.6–15.1)
ETHANOL SERPL-MCNC: <3 MG/DL (ref 0–3)
GFR SERPL CREATININE-BSD FRML MDRD: 55 ML/MIN/1.73SQ M
GLUCOSE SERPL-MCNC: 120 MG/DL (ref 65–140)
GLUCOSE SERPL-MCNC: 193 MG/DL (ref 65–140)
GLUCOSE UR STRIP-MCNC: NEGATIVE MG/DL
HCT VFR BLD AUTO: 42.7 % (ref 36.5–49.3)
HGB BLD-MCNC: 15.2 G/DL (ref 12–17)
HGB UR QL STRIP.AUTO: NEGATIVE
HYALINE CASTS #/AREA URNS LPF: ABNORMAL /LPF
IMM GRANULOCYTES # BLD AUTO: 0.02 THOUSAND/UL (ref 0–0.2)
IMM GRANULOCYTES NFR BLD AUTO: 0 % (ref 0–2)
INR PPP: 0.96 (ref 0.84–1.19)
KETONES UR STRIP-MCNC: ABNORMAL MG/DL
LEUKOCYTE ESTERASE UR QL STRIP: ABNORMAL
LYMPHOCYTES # BLD AUTO: 1.69 THOUSANDS/ÂΜL (ref 0.6–4.47)
LYMPHOCYTES NFR BLD AUTO: 20 % (ref 14–44)
MCH RBC QN AUTO: 33.4 PG (ref 26.8–34.3)
MCHC RBC AUTO-ENTMCNC: 35.6 G/DL (ref 31.4–37.4)
MCV RBC AUTO: 94 FL (ref 82–98)
MONOCYTES # BLD AUTO: 0.88 THOUSAND/ÂΜL (ref 0.17–1.22)
MONOCYTES NFR BLD AUTO: 10 % (ref 4–12)
MUCOUS THREADS UR QL AUTO: ABNORMAL
NEUTROPHILS # BLD AUTO: 5.73 THOUSANDS/ÂΜL (ref 1.85–7.62)
NEUTS SEG NFR BLD AUTO: 66 % (ref 43–75)
NITRITE UR QL STRIP: NEGATIVE
NON-SQ EPI CELLS URNS QL MICRO: ABNORMAL /HPF
NRBC BLD AUTO-RTO: 0 /100 WBCS
OSMOLALITY UR/SERPL-RTO: 294 MMOL/KG (ref 282–298)
OSMOLALITY UR: 882 MMOL/KG
P AXIS: 51 DEGREES
PH UR STRIP.AUTO: 5.5 [PH]
PLATELET # BLD AUTO: 288 THOUSANDS/UL (ref 149–390)
PMV BLD AUTO: 10.3 FL (ref 8.9–12.7)
POTASSIUM SERPL-SCNC: 3.5 MMOL/L (ref 3.5–5.3)
PR INTERVAL: 156 MS
PROT SERPL-MCNC: 7.8 G/DL (ref 6.4–8.4)
PROT UR STRIP-MCNC: ABNORMAL MG/DL
PROTHROMBIN TIME: 13.5 SECONDS (ref 11.6–14.5)
QRS AXIS: -2 DEGREES
QRSD INTERVAL: 68 MS
QT INTERVAL: 384 MS
QTC INTERVAL: 453 MS
RBC # BLD AUTO: 4.55 MILLION/UL (ref 3.88–5.62)
RBC #/AREA URNS AUTO: ABNORMAL /HPF
SODIUM 24H UR-SCNC: 26 MOL/L
SODIUM SERPL-SCNC: 133 MMOL/L (ref 135–147)
SP GR UR STRIP.AUTO: >=1.03 (ref 1–1.03)
T WAVE AXIS: 74 DEGREES
UROBILINOGEN UR STRIP-ACNC: >=12 MG/DL
VENTRICULAR RATE: 84 BPM
WBC # BLD AUTO: 8.64 THOUSAND/UL (ref 4.31–10.16)
WBC #/AREA URNS AUTO: ABNORMAL /HPF

## 2022-10-24 PROCEDURE — 80076 HEPATIC FUNCTION PANEL: CPT | Performed by: PHYSICIAN ASSISTANT

## 2022-10-24 PROCEDURE — 93010 ELECTROCARDIOGRAM REPORT: CPT | Performed by: INTERNAL MEDICINE

## 2022-10-24 PROCEDURE — 82948 REAGENT STRIP/BLOOD GLUCOSE: CPT

## 2022-10-24 PROCEDURE — 80177 DRUG SCRN QUAN LEVETIRACETAM: CPT | Performed by: PHYSICIAN ASSISTANT

## 2022-10-24 PROCEDURE — 70450 CT HEAD/BRAIN W/O DYE: CPT

## 2022-10-24 PROCEDURE — 70486 CT MAXILLOFACIAL W/O DYE: CPT

## 2022-10-24 PROCEDURE — 36415 COLL VENOUS BLD VENIPUNCTURE: CPT | Performed by: EMERGENCY MEDICINE

## 2022-10-24 PROCEDURE — 84484 ASSAY OF TROPONIN QUANT: CPT | Performed by: EMERGENCY MEDICINE

## 2022-10-24 PROCEDURE — 83935 ASSAY OF URINE OSMOLALITY: CPT | Performed by: PHYSICIAN ASSISTANT

## 2022-10-24 PROCEDURE — 99220 PR INITIAL OBSERVATION CARE/DAY 70 MINUTES: CPT | Performed by: HOSPITALIST

## 2022-10-24 PROCEDURE — 72125 CT NECK SPINE W/O DYE: CPT

## 2022-10-24 PROCEDURE — 84300 ASSAY OF URINE SODIUM: CPT | Performed by: PHYSICIAN ASSISTANT

## 2022-10-24 PROCEDURE — 85025 COMPLETE CBC W/AUTO DIFF WBC: CPT | Performed by: EMERGENCY MEDICINE

## 2022-10-24 PROCEDURE — 85730 THROMBOPLASTIN TIME PARTIAL: CPT | Performed by: EMERGENCY MEDICINE

## 2022-10-24 PROCEDURE — 71045 X-RAY EXAM CHEST 1 VIEW: CPT

## 2022-10-24 PROCEDURE — 0HQ1XZZ REPAIR FACE SKIN, EXTERNAL APPROACH: ICD-10-PCS | Performed by: EMERGENCY MEDICINE

## 2022-10-24 PROCEDURE — 90715 TDAP VACCINE 7 YRS/> IM: CPT | Performed by: EMERGENCY MEDICINE

## 2022-10-24 PROCEDURE — 81001 URINALYSIS AUTO W/SCOPE: CPT | Performed by: EMERGENCY MEDICINE

## 2022-10-24 PROCEDURE — 83930 ASSAY OF BLOOD OSMOLALITY: CPT | Performed by: PHYSICIAN ASSISTANT

## 2022-10-24 PROCEDURE — 99285 EMERGENCY DEPT VISIT HI MDM: CPT | Performed by: EMERGENCY MEDICINE

## 2022-10-24 PROCEDURE — 93005 ELECTROCARDIOGRAM TRACING: CPT

## 2022-10-24 PROCEDURE — 85610 PROTHROMBIN TIME: CPT | Performed by: EMERGENCY MEDICINE

## 2022-10-24 PROCEDURE — 80048 BASIC METABOLIC PNL TOTAL CA: CPT | Performed by: EMERGENCY MEDICINE

## 2022-10-24 PROCEDURE — 82077 ASSAY SPEC XCP UR&BREATH IA: CPT | Performed by: EMERGENCY MEDICINE

## 2022-10-24 RX ORDER — ONDANSETRON 2 MG/ML
4 INJECTION INTRAMUSCULAR; INTRAVENOUS EVERY 6 HOURS PRN
Status: DISCONTINUED | OUTPATIENT
Start: 2022-10-24 | End: 2022-10-26 | Stop reason: HOSPADM

## 2022-10-24 RX ORDER — FLUOXETINE HYDROCHLORIDE 20 MG/1
20 CAPSULE ORAL EVERY MORNING
Status: DISCONTINUED | OUTPATIENT
Start: 2022-10-25 | End: 2022-10-26 | Stop reason: HOSPADM

## 2022-10-24 RX ORDER — HEPARIN SODIUM 5000 [USP'U]/ML
5000 INJECTION, SOLUTION INTRAVENOUS; SUBCUTANEOUS EVERY 8 HOURS SCHEDULED
Status: DISCONTINUED | OUTPATIENT
Start: 2022-10-24 | End: 2022-10-26 | Stop reason: HOSPADM

## 2022-10-24 RX ORDER — LIDOCAINE HYDROCHLORIDE AND EPINEPHRINE 10; 10 MG/ML; UG/ML
20 INJECTION, SOLUTION INFILTRATION; PERINEURAL ONCE
Status: COMPLETED | OUTPATIENT
Start: 2022-10-24 | End: 2022-10-24

## 2022-10-24 RX ORDER — SODIUM CHLORIDE, SODIUM GLUCONATE, SODIUM ACETATE, POTASSIUM CHLORIDE, MAGNESIUM CHLORIDE, SODIUM PHOSPHATE, DIBASIC, AND POTASSIUM PHOSPHATE .53; .5; .37; .037; .03; .012; .00082 G/100ML; G/100ML; G/100ML; G/100ML; G/100ML; G/100ML; G/100ML
100 INJECTION, SOLUTION INTRAVENOUS CONTINUOUS
Status: DISCONTINUED | OUTPATIENT
Start: 2022-10-24 | End: 2022-10-25

## 2022-10-24 RX ORDER — PANTOPRAZOLE SODIUM 40 MG/1
40 TABLET, DELAYED RELEASE ORAL
Status: DISCONTINUED | OUTPATIENT
Start: 2022-10-25 | End: 2022-10-26 | Stop reason: HOSPADM

## 2022-10-24 RX ORDER — ACETAMINOPHEN 325 MG/1
650 TABLET ORAL EVERY 6 HOURS PRN
Status: DISCONTINUED | OUTPATIENT
Start: 2022-10-24 | End: 2022-10-26 | Stop reason: HOSPADM

## 2022-10-24 RX ORDER — AMLODIPINE BESYLATE 5 MG/1
10 TABLET ORAL DAILY
Status: DISCONTINUED | OUTPATIENT
Start: 2022-10-25 | End: 2022-10-26 | Stop reason: HOSPADM

## 2022-10-24 RX ORDER — LEVETIRACETAM 500 MG/1
500 TABLET ORAL 2 TIMES DAILY
Status: DISCONTINUED | OUTPATIENT
Start: 2022-10-24 | End: 2022-10-26 | Stop reason: HOSPADM

## 2022-10-24 RX ADMIN — TETANUS TOXOID, REDUCED DIPHTHERIA TOXOID AND ACELLULAR PERTUSSIS VACCINE, ADSORBED 0.5 ML: 5; 2.5; 8; 8; 2.5 SUSPENSION INTRAMUSCULAR at 12:37

## 2022-10-24 RX ADMIN — LEVETIRACETAM 500 MG: 250 TABLET, FILM COATED ORAL at 22:03

## 2022-10-24 RX ADMIN — SODIUM CHLORIDE, SODIUM GLUCONATE, SODIUM ACETATE, POTASSIUM CHLORIDE, MAGNESIUM CHLORIDE, SODIUM PHOSPHATE, DIBASIC, AND POTASSIUM PHOSPHATE 100 ML/HR: .53; .5; .37; .037; .03; .012; .00082 INJECTION, SOLUTION INTRAVENOUS at 18:53

## 2022-10-24 RX ADMIN — LIDOCAINE HYDROCHLORIDE,EPINEPHRINE BITARTRATE 20 ML: 10; .01 INJECTION, SOLUTION INFILTRATION; PERINEURAL at 12:38

## 2022-10-24 RX ADMIN — HEPARIN SODIUM 5000 UNITS: 5000 INJECTION INTRAVENOUS; SUBCUTANEOUS at 22:03

## 2022-10-24 NOTE — ASSESSMENT & PLAN NOTE
· 4th fall in 2 months  · Seen in ER 1 week ago and diagnosed with a concussion after fall at that time, reports he has not felt well since  · Typically related to alcohol use, ETOH negative on admission   · Reports he was lightheaded prior to event today, mechanical in nature    · May be related to lack of AED medication  · Keppra level pending  · Echo pending  · Orthostatics pending  · PT/OT

## 2022-10-24 NOTE — ED PROVIDER NOTES
Emergency Department Trauma Note  Marychuy Gimenez 78 y o  male MRN: [id number]  Unit/Bed#: ED 09/ED 09 Encounter: [id number]      Trauma Alert: Trauma Acuity: Trauma Evaluation  Model of Arrival: Mode of Arrival: BLS via Trauma Squad Name and Number: [id number]  Trauma Team: Current Providers  Attending Provider: Carrie Lawson DO  Attending Provider: Alison Ventura MD  Registered Nurse: Rachel Gomez RN  Consultants:     None      History of Present Illness     Chief Complaint:   Chief Complaint   Patient presents with   • Fall     Pt to ED via EMS following a fall  Pt states he got dizzy and then fell  Denies LOC  Denies thinners  Pt was seen in ED last week, Dx with concussion at that time  Lac to R head  HPI:  Marychuy Gimenez is a 78 y o  male who presents with   Mechanical fall  Mechanism:Details of Incident: fall Injury Date: 10/24/22 Injury Time: 56       71-year-old male presents for evaluation of mechanical fall that occurred this morning  Patient noted to have laceration to right forehead  Patient denies preceding chest pain, shortness of breath  Patient does report report feeling lightheaded prior to the fall and he was attempting to get himself to the couch but was unable to make that for  Patient states lightheadedness is now improved  Patient with a history of multiple falls recently but he states they are typically related to his alcohol intoxication  Patient denies drinking alcohol today  He is able to move all 4 extremities and follows commands  Review of Systems   Constitutional: Negative for fever  Skin: Positive for wound  Neurological: Positive for light-headedness  All other systems reviewed and are negative        Historical Information     Immunizations:   Immunization History   Administered Date(s) Administered   • Tdap 10/24/2022       Past Medical History:   Diagnosis Date   • Alcohol abuse    • Anxiety    • Cancer [facility])     kidney   • Depression    • GERD (gastroesophageal reflux disease)    • Hypertension        Family History   Problem Relation Age of Onset   • Colon cancer Neg Hx    • Colon polyps Neg Hx      Past Surgical History:   Procedure Laterality Date   • NEPHRECTOMY Left    • UMBILICAL HERNIA REPAIR       Social History     Tobacco Use   • Smoking status: Never Smoker   • Smokeless tobacco: Never Used   Vaping Use   • Vaping Use: Never used   Substance Use Topics   • Alcohol use: Yes     Comment: a gallon of gin a week   • Drug use: Never     E-Cigarette/Vaping   • E-Cigarette Use Never User      E-Cigarette/Vaping Substances   • Nicotine No    • THC No    • CBD No    • Flavoring No    • Other No    • Unknown No        Family History: non-contributory    Meds/Allergies   Prior to Admission Medications   Prescriptions Last Dose Informant Patient Reported? Taking? FLUoxetine (PROzac) 20 mg capsule   Yes No   Sig: Take 20 mg by mouth every morning   amLODIPine (NORVASC) 10 mg tablet  Self Yes No   docusate sodium (COLACE) 100 mg capsule  Self No No   Sig: Take 1 capsule (100 mg total) by mouth every 12 (twelve) hours   levETIRAcetam (KEPPRA) 500 mg tablet   Yes No   Sig: Take 500 mg by mouth 2 (two) times a day   pantoprazole (PROTONIX) 40 mg tablet  Self Yes No      Facility-Administered Medications: None       No Known Allergies    PHYSICAL EXAM    PE limited by:  none    Objective   Vitals:   First set: Temperature: 97 9 °F (36 6 °C) (10/24/22 1054)  Pulse: 82 (10/24/22 1054)  Respirations: 20 (10/24/22 1054)  Blood Pressure: 154/96 (10/24/22 1054)  SpO2: 98 % (10/24/22 1054)    Primary Survey:   (A) Airway:  intact  (B) Breathing:  Bilateral breath sounds  (C) Circulation: Pulses:   normal  (D) Disabliity:  GCS Total:  15  (E) Expose:  Completed    Secondary Survey: (Click on Physical Exam tab above)  Physical Exam  Vitals and nursing note reviewed  Constitutional:       General: He is not in acute distress  Appearance: He is well-developed  HENT:      Head: Normocephalic  Comments: Complex laceration to right forehead     Right Ear: External ear normal       Left Ear: External ear normal       Nose: Nose normal    Eyes:      General: No scleral icterus  Neck:      Comments: No CT L-spine tenderness  Pulmonary:      Effort: Pulmonary effort is normal  No respiratory distress  Abdominal:      General: There is no distension  Palpations: Abdomen is soft  Musculoskeletal:         General: No deformity  Normal range of motion  Cervical back: Normal range of motion and neck supple  Comments: 5/5 strength of bilateral upper and lower extremities   Skin:     General: Skin is warm  Findings: No rash  Neurological:      General: No focal deficit present  Mental Status: He is alert  Gait: Gait normal    Psychiatric:         Mood and Affect: Mood normal          Cervical spine cleared by clinical criteria?  No (imaging required)      Invasive Devices  Report    None                 Lab Results:   Results Reviewed     Procedure Component Value Units Date/Time    Fingerstick Glucose (POCT) [[id number]]  (Abnormal) Collected: 10/24/22 1607    Lab Status: Final result Updated: 10/24/22 1608     POC Glucose 193 mg/dl     HS Troponin I 2hr [[id number]]  (Normal) Collected: 10/24/22 1448    Lab Status: Final result Specimen: Blood from Arm, Left Updated: 10/24/22 1518     hs TnI 2hr 9 ng/L      Delta 2hr hsTnI 0 ng/L     Urine Microscopic [[id number]]  (Abnormal) Collected: 10/24/22 1345    Lab Status: Final result Specimen: Urine, Clean Catch Updated: 10/24/22 1450     RBC, UA 0-1 /hpf      WBC, UA 0-1 /hpf      Epithelial Cells None Seen /hpf      Bacteria, UA Occasional /hpf      MUCUS THREADS Innumerable     Hyaline Casts, UA 1-2 /lpf     HS Troponin I 4hr [[id number]]     Lab Status: No result Specimen: Blood     UA w Reflex to Microscopic w Reflex to Culture [[id number]]  (Abnormal) Collected: 10/24/22 1345    Lab Status: Final result Specimen: Urine, Clean Catch Updated: 10/24/22 1406     Color, UA Brown     Clarity, UA Clear     Specific Gravity, UA >=1 030     pH, UA 5 5     Leukocytes, UA Trace     Nitrite, UA Negative     Protein, UA 30 (1+) mg/dl      Glucose, UA Negative mg/dl      Ketones, UA Trace mg/dl      Urobilinogen, UA >=12 0 mg/dl      Bilirubin, UA Large     Occult Blood, UA Negative    Ethanol [[id number]]  (Normal) Collected: 10/24/22 1236    Lab Status: Final result Specimen: Blood from Arm, Right Updated: 10/24/22 1347     Ethanol Lvl <3 mg/dL     Basic metabolic panel [[id number]]  (Abnormal) Collected: 10/24/22 1236    Lab Status: Final result Specimen: Blood from Arm, Right Updated: 10/24/22 1325     Sodium 133 mmol/L      Potassium 3 5 mmol/L      Chloride 99 mmol/L      CO2 28 mmol/L      ANION GAP 6 mmol/L      BUN 21 mg/dL      Creatinine 1 32 mg/dL      Glucose 120 mg/dL      Calcium 9 0 mg/dL      eGFR 55 ml/min/1 73sq m     Narrative:      [facility] guidelines for Chronic Kidney Disease (CKD):   •  Stage 1 with normal or high GFR (GFR > 90 mL/min/1 73 square meters)  •  Stage 2 Mild CKD (GFR = 60-89 mL/min/1 73 square meters)  •  Stage 3A Moderate CKD (GFR = 45-59 mL/min/1 73 square meters)  •  Stage 3B Moderate CKD (GFR = 30-44 mL/min/1 73 square meters)  •  Stage 4 Severe CKD (GFR = 15-29 mL/min/1 73 square meters)  •  Stage 5 End Stage CKD (GFR <15 mL/min/1 73 square meters)  Note: GFR calculation is accurate only with a steady state creatinine    Protime-INR [[id number]]  (Normal) Collected: 10/24/22 1303    Lab Status: Final result Specimen: Blood from Arm, Right Updated: 10/24/22 1323     Protime 13 5 seconds      INR 0 96    APTT [[id number]]  (Normal) Collected: 10/24/22 1303    Lab Status: Final result Specimen: Blood from Arm, Right Updated: 10/24/22 1323     PTT 25 seconds     HS Troponin 0hr (reflex protocol) [[id number]]  (Normal) Collected: 10/24/22 9311    Lab Status: Final result Specimen: Blood from Arm, Right Updated: 10/24/22 1315     hs TnI 0hr 9 ng/L     CBC and differential [[id number]] Collected: 10/24/22 1236    Lab Status: Final result Specimen: Blood from Arm, Right Updated: 10/24/22 1244     WBC 8 64 Thousand/uL      RBC 4 55 Million/uL      Hemoglobin 15 2 g/dL      Hematocrit 42 7 %      MCV 94 fL      MCH 33 4 pg      MCHC 35 6 g/dL      RDW 14 9 %      MPV 10 3 fL      Platelets 319 Thousands/uL      nRBC 0 /100 WBCs      Neutrophils Relative 66 %      Immat GRANS % 0 %      Lymphocytes Relative 20 %      Monocytes Relative 10 %      Eosinophils Relative 3 %      Basophils Relative 1 %      Neutrophils Absolute 5 73 Thousands/µL      Immature Grans Absolute 0 02 Thousand/uL      Lymphocytes Absolute 1 69 Thousands/µL      Monocytes Absolute 0 88 Thousand/µL      Eosinophils Absolute 0 27 Thousand/µL      Basophils Absolute 0 05 Thousands/µL                  Imaging Studies:   Direct to CT: Yes  TRAUMA - CT head wo contrast   Final Result by Mark Hines MD (10/24 1140)      No acute intracranial abnormality  Chronic microangiopathic changes  Workstation performed: [id number]         TRAUMA - CT spine cervical wo contrast   Final Result by Mark Hines MD (10/24 1159)      No cervical spine fracture or traumatic malalignment  Workstation performed: [id number]         TRAUMA - CT facial bones wo contrast   Final Result by Mark Hines MD (10/24 1149)   No facial bone fracture  Workstation performed: [id number]         XR Trauma chest portable   Final Result by Igor Segovia MD (10/24 1231)      No acute cardiopulmonary disease        Findings are stable            Workstation performed: [id number]               Procedures  Procedures         ED Course           MDM  Number of Diagnoses or Management Options  Ambulatory dysfunction: new and requires workup  Facial laceration, initial encounter: new and requires workup  Fall, initial encounter: new and requires workup  Diagnosis management comments:   67-year-old male presenting with mechanical fall  Trauma evaluation called  CT head, cervical spine, chest x-ray and also obtain labs/ekg given presyncopal episode  discussed all results with patient  Per laceration repaired  Patient was discharge but upon leaving the department, had 2 mechanical falls  Patient was caught by staff members and no further trauma sustained  Patient is unable to safely go home given his ambulatory dysfunction  Will admit to [facility] for further PT/OT evaluation       Amount and/or Complexity of Data Reviewed  Clinical lab tests: reviewed and ordered  Tests in the radiology section of CPT®: reviewed and ordered  Tests in the medicine section of CPT®: ordered and reviewed  Decide to obtain previous medical records or to obtain history from someone other than the patient: yes  Review and summarize past medical records: yes            Disposition  [facility] One Transfer: No  Final diagnoses:   Fall, initial encounter   Facial laceration, initial encounter   Ambulatory dysfunction     Time reflects when diagnosis was documented in both MDM as applicable and the Disposition within this note     Time User Action Codes Description Comment    10/24/2022  3:27 PM Angella Rosamaria Add [S60  XYSC] Fall, initial encounter     10/24/2022  3:27 PM Angella Rosamaria Add [M03 95UB] Facial laceration, initial encounter     10/24/2022  3:27 PM Georgann Pulse [N77  WTKG] Fall, initial encounter     10/24/2022  3:27 PM Angella Rosamaria Modify [S01 81XA] Facial laceration, initial encounter     10/24/2022  4:07 PM Angella Rosamaria Add [R26 2] Ambulatory dysfunction       ED Disposition     ED Disposition   Admit    Condition   Stable    Date/Time   Mon Oct 24, 2022  4:07 PM    Comment   Case was discussed with LENIN and the patient's admission status was agreed to be Admission Status: observation status to the service of Dr James Saenz   Follow-up Information     Follow up With Specialties Details Why Contact Info Additional Information    Gregoria Pantoja MD Internal Medicine In 5 days For removal of 6 total sutures [address] Emergency Department Emergency Medicine  If symptoms worsen [address] [place],[address] [id number]  [address] [facility] Emergency Department, [address] [place] [address], [facility] [place] [facility] [address]        Patient's Medications   Discharge Prescriptions    No medications on file     No discharge procedures on file      PDMP Review     None          ED Provider  Electronically Signed by         Lance Ji DO  10/24/22 1822

## 2022-10-24 NOTE — Clinical Note
[facility] discharge to home/self care  Provider at bedside       Nikkie Seals RN  01/30/20 0679

## 2022-10-24 NOTE — H&P
[place]  H&P- Donta Gonzalez 1955, 78 y o  male MRN: [id number]  Unit/Bed#: ED 09 Encounter: [id number]  Primary Care Provider: Daly Yeboah MD   Date and time admitted to hospital: 10/24/2022 10:52 AM    Ambulatory dysfunction  Assessment & Plan  · 4th fall in 2 months  · Seen in ER 1 week ago and diagnosed with a concussion after fall at that time, reports he has not felt well since  · Typically related to alcohol use, ETOH negative on admission   · Reports he was lightheaded prior to event today, mechanical in nature    · May be related to lack of AED medication  · Keppra level pending  · Echo pending  · Orthostatics pending  · PT/OT     Bilirubin in urine  Assessment & Plan  · RUQ ultrasound pending  · LFTs pending    Hyponatremia  Assessment & Plan  · 133 on admission  · Osmolality, urine osmolality and sodium ordered  · IVF overnight  · Repeat BMP in AM    Laceration of face  Assessment & Plan  · Above right eye, sutures placed by ED  · Remove sutures in approximately 5 days    History of seizure  Assessment & Plan  · Possibly alcohol related, patient denies history of seizure  · Maintained on Keppra 500 mg b i d   · Patient reports not having taken his medications for number of days after his recent discharge due to not feeling well    Stage 3 chronic kidney disease [facility])  Assessment & Plan  Lab Results   Component Value Date    EGFR 55 10/24/2022    EGFR 55 10/15/2022    EGFR 61 10/06/2022    CREATININE 1 32 (H) 10/24/2022    CREATININE 1 32 (H) 10/15/2022    CREATININE 1 22 10/06/2022     · Baseline 1 10-1 30  · 1 32 on admission  · Trend BMP while admitted    Hypertension  Assessment & Plan  · Continue Norvasc 10 mg daily    GERD (gastroesophageal reflux disease)  Assessment & Plan  · Continue PPI    Alcohol abuse  Assessment & Plan  · CIWA protocol  · Denies history of withdrawal or withdrawal seizures but does not appear to be accurate historian    VTE Pharmacologic Prophylaxis: VTE Score: 3 Moderate Risk (Score 3-4) - Pharmacological DVT Prophylaxis Ordered: heparin  Code Status: No Order Full Code  Discussion with family: Patient declined call to   Anticipated Length of Stay: Patient will be admitted on an observation basis with an anticipated length of stay of less than 2 midnights secondary to ambulatory dysfunction  Total Time for Visit, including Counseling / Coordination of Care: 60 minutes Greater than 50% of this total time spent on direct patient counseling and coordination of care  Chief Complaint:  Fall    History of Present Illness:  Paulina Price is a 78 y o  male with a PMH of heavy alcohol use, frequent falls, GERD, prior seizure who presents with fall  Patient reports he has felt unwell since his recent discharge from the ER last week after he was evaluated for fall due to heavy alcohol use  This time he denies any alcohol use aside from last evening  Was mechanical in nature, felt lightheaded prior to fall  He denies taking any of his prescribed medications until today, because he was not feeling well  Trauma evaluation was negative  He does have a laceration above his right eye which was sutured in the emergency department  Slightly elevated creatinine, though appears to be within patient's baseline, administer IVF overnight for hyponatremia of 133 (likely related to chronic alcohol use)  Obtain echocardiogram, orthostatics, resume home medications including Keppra, Keppra level pending  PT/OT to evaluate  Review of Systems:  Review of Systems   Neurological: Positive for dizziness, weakness and light-headedness  All other systems reviewed and are negative        Past Medical and Surgical History:   Past Medical History:   Diagnosis Date   • Alcohol abuse    • Anxiety    • Cancer ([facility] [address] )     kidney   • Depression    • GERD (gastroesophageal reflux disease)    • Hypertension        Past Surgical History: Procedure Laterality Date   • NEPHRECTOMY Left    • UMBILICAL HERNIA REPAIR         Meds/Allergies:  Prior to Admission medications    Medication Sig Start Date End Date Taking? Authorizing Provider   amLODIPine (NORVASC) 10 mg tablet     Historical Provider, MD   docusate sodium (COLACE) 100 mg capsule Take 1 capsule (100 mg total) by mouth every 12 (twelve) hours 10/29/21   Latia Mccord MD   FLUoxetine (PROzac) 20 mg capsule Take 20 mg by mouth every morning 6/15/22   Historical Provider, MD   levETIRAcetam (KEPPRA) 500 mg tablet Take 500 mg by mouth 2 (two) times a day 7/11/22   Historical Provider, MD   pantoprazole (PROTONIX) 40 mg tablet     Historical Provider, MD   polyethylene glycol (Golytely) 4000 mL solution Take 4,000 mL by mouth once for 1 dose Take 4000 mL by mouth once for 1 dose  Use as directed 6/3/22 7/26/22  Marc Love, [address] have reviewed home medications with patient personally  - he brought prescription bottle medications    Allergies: No Known Allergies    Social History:  Marital Status: Single   Occupation:   Patient Pre-hospital Living Situation: Home  Patient Pre-hospital Level of Mobility: walks  Patient Pre-hospital Diet Restrictions: None  Substance Use History:   Social History     Substance and Sexual Activity   Alcohol Use Yes    Comment: a gallon of gin a week     Social History     Tobacco Use   Smoking Status Never Smoker   Smokeless Tobacco Never Used     Social History     Substance and Sexual Activity   Drug Use Never       Family History:  Family History   Problem Relation Age of Onset   • Colon cancer Neg Hx    • Colon polyps Neg Hx        Physical Exam:     Vitals:   Blood Pressure: 119/78 (10/24/22 1700)  Pulse: 78 (10/24/22 1700)  Temperature: 97 9 °F (36 6 °C) (10/24/22 1054)  Temp Source: Oral (10/24/22 1054)  Respirations: 22 (10/24/22 1400)  Height: 5' 8" (172 7 cm) (10/24/22 1054)  Weight - Scale: 83 5 kg (184 lb) (10/24/22 1054)  SpO2: 94 % (10/24/22 1700)    Physical Exam  Vitals and nursing note reviewed  Constitutional:       General: He is not in acute distress  Appearance: He is well-developed  Comments: Disheveled, appears older than stated age   HENT:      Head: Normocephalic  Laceration (Above R eye, sutures in place) present  Eyes:      General: No scleral icterus  Conjunctiva/sclera: Conjunctivae normal    Cardiovascular:      Rate and Rhythm: Normal rate and regular rhythm  Heart sounds: No murmur heard  Pulmonary:      Effort: Pulmonary effort is normal       Breath sounds: No wheezing, rhonchi or rales  Abdominal:      General: There is no distension  Palpations: Abdomen is soft  Skin:     General: Skin is warm and dry  Neurological:      General: No focal deficit present  Mental Status: He is alert  Psychiatric:         Mood and Affect: Mood normal         Additional Data:     Lab Results:  Results from last 7 days   Lab Units 10/24/22  1236   WBC Thousand/uL 8 64   HEMOGLOBIN g/dL 15 2   HEMATOCRIT % 42 7   PLATELETS Thousands/uL 288   NEUTROS PCT % 66   LYMPHS PCT % 20   MONOS PCT % 10   EOS PCT % 3     Results from last 7 days   Lab Units 10/24/22  1236   SODIUM mmol/L 133*   POTASSIUM mmol/L 3 5   CHLORIDE mmol/L 99   CO2 mmol/L 28   BUN mg/dL 21   CREATININE mg/dL 1 32*   ANION GAP mmol/L 6   CALCIUM mg/dL 9 0   GLUCOSE RANDOM mg/dL 120     Results from last 7 days   Lab Units 10/24/22  1303   INR  0 96     Results from last 7 days   Lab Units 10/24/22  1607   POC GLUCOSE mg/dl 193*               Imaging: Reviewed radiology reports from this admission including: CT head and CT cervical spine  TRAUMA - CT head wo contrast   Final Result by Richard Herbert MD (10/24 1100)      No acute intracranial abnormality  Chronic microangiopathic changes                    Workstation performed: [id number]         TRAUMA - CT spine cervical wo contrast   Final Result by Richard Herbert MD (10/24 4171)      No cervical spine fracture or traumatic malalignment  Workstation performed: [id number]         TRAUMA - CT facial bones wo contrast   Final Result by Sarah Headley MD (10/24 1144)   No facial bone fracture  Workstation performed: [id number]         XR Trauma chest portable   Final Result by Anthony Fuentes MD (10/24 3494)      No acute cardiopulmonary disease  Findings are stable            Workstation performed: [id number]         US right upper quadrant    (Results Pending)       EKG and Other Studies Reviewed on Admission:   · EKG: NSR  HR 84bpm     ** Please Note: This note has been constructed using a voice recognition system   **

## 2022-10-24 NOTE — ASSESSMENT & PLAN NOTE
· Possibly alcohol related, patient denies history of seizure  · Maintained on Keppra 500 mg b i d   · Patient reports not having taken his medications for number of days after his recent discharge due to not feeling well

## 2022-10-24 NOTE — ASSESSMENT & PLAN NOTE
Lab Results   Component Value Date    EGFR 55 10/24/2022    EGFR 55 10/15/2022    EGFR 61 10/06/2022    CREATININE 1 32 (H) 10/24/2022    CREATININE 1 32 (H) 10/15/2022    CREATININE 1 22 10/06/2022     · Baseline 1 10-1 30  · 1 32 on admission  · Trend BMP while admitted

## 2022-10-24 NOTE — ASSESSMENT & PLAN NOTE
· CIWA protocol  · Denies history of withdrawal or withdrawal seizures but does not appear to be accurate historian

## 2022-10-24 NOTE — ASSESSMENT & PLAN NOTE
· 133 on admission  · Osmolality, urine osmolality and sodium ordered  · IVF overnight  · Repeat BMP in AM

## 2022-10-25 ENCOUNTER — APPOINTMENT (OUTPATIENT)
Dept: ULTRASOUND IMAGING | Facility: HOSPITAL | Age: 67
End: 2022-10-25
Payer: MEDICARE

## 2022-10-25 ENCOUNTER — APPOINTMENT (OUTPATIENT)
Dept: NON INVASIVE DIAGNOSTICS | Facility: HOSPITAL | Age: 67
End: 2022-10-25
Payer: MEDICARE

## 2022-10-25 PROBLEM — E87.1 HYPONATREMIA: Status: RESOLVED | Noted: 2022-10-24 | Resolved: 2022-10-25

## 2022-10-25 PROBLEM — E87.6 HYPOKALEMIA: Status: ACTIVE | Noted: 2022-10-25

## 2022-10-25 PROBLEM — R82.2 BILIRUBIN IN URINE: Status: RESOLVED | Noted: 2022-10-24 | Resolved: 2022-10-25

## 2022-10-25 PROBLEM — R26.2 AMBULATORY DYSFUNCTION: Status: RESOLVED | Noted: 2022-10-24 | Resolved: 2022-10-25

## 2022-10-25 LAB
ALBUMIN SERPL BCP-MCNC: 3.3 G/DL (ref 3.5–5)
ALP SERPL-CCNC: 96 U/L (ref 46–116)
ALT SERPL W P-5'-P-CCNC: 55 U/L (ref 12–78)
ANION GAP SERPL CALCULATED.3IONS-SCNC: 7 MMOL/L (ref 4–13)
AORTIC ROOT: 3.3 CM
APICAL FOUR CHAMBER EJECTION FRACTION: 61 %
ASCENDING AORTA: 3.3 CM
AST SERPL W P-5'-P-CCNC: 35 U/L (ref 5–45)
BASOPHILS # BLD AUTO: 0.04 THOUSANDS/ÂΜL (ref 0–0.1)
BASOPHILS NFR BLD AUTO: 1 % (ref 0–1)
BILIRUB DIRECT SERPL-MCNC: 0.65 MG/DL (ref 0–0.2)
BILIRUB SERPL-MCNC: 1.7 MG/DL (ref 0.2–1)
BUN SERPL-MCNC: 16 MG/DL (ref 5–25)
CALCIUM SERPL-MCNC: 8.2 MG/DL (ref 8.3–10.1)
CHLORIDE SERPL-SCNC: 102 MMOL/L (ref 96–108)
CO2 SERPL-SCNC: 27 MMOL/L (ref 21–32)
CREAT SERPL-MCNC: 1.19 MG/DL (ref 0.6–1.3)
DME PARACHUTE DELIVERY DATE REQUESTED: NORMAL
DME PARACHUTE ITEM DESCRIPTION: NORMAL
DME PARACHUTE ORDER STATUS: NORMAL
DME PARACHUTE SUPPLIER NAME: NORMAL
DME PARACHUTE SUPPLIER PHONE: NORMAL
E WAVE DECELERATION TIME: 276 MS
EOSINOPHIL # BLD AUTO: 0.24 THOUSAND/ÂΜL (ref 0–0.61)
EOSINOPHIL NFR BLD AUTO: 4 % (ref 0–6)
ERYTHROCYTE [DISTWIDTH] IN BLOOD BY AUTOMATED COUNT: 15 % (ref 11.6–15.1)
FRACTIONAL SHORTENING: 34 (ref 28–44)
GFR SERPL CREATININE-BSD FRML MDRD: 63 ML/MIN/1.73SQ M
GLUCOSE SERPL-MCNC: 94 MG/DL (ref 65–140)
HCT VFR BLD AUTO: 36.4 % (ref 36.5–49.3)
HGB BLD-MCNC: 13 G/DL (ref 12–17)
IMM GRANULOCYTES # BLD AUTO: 0 THOUSAND/UL (ref 0–0.2)
IMM GRANULOCYTES NFR BLD AUTO: 0 % (ref 0–2)
INTERVENTRICULAR SEPTUM IN DIASTOLE (PARASTERNAL SHORT AXIS VIEW): 1.2 CM
INTERVENTRICULAR SEPTUM: 1.2 CM (ref 0.6–1.1)
LAAS-AP2: 10.8 CM2
LAAS-AP4: 14.5 CM2
LEFT ATRIUM SIZE: 3.4 CM
LEFT INTERNAL DIMENSION IN SYSTOLE: 2.9 CM (ref 2.1–4)
LEFT VENTRICLE DIASTOLIC VOLUME (MOD BIPLANE): 94 ML
LEFT VENTRICLE SYSTOLIC VOLUME (MOD BIPLANE): 37 ML
LEFT VENTRICULAR INTERNAL DIMENSION IN DIASTOLE: 4.4 CM (ref 3.5–6)
LEFT VENTRICULAR POSTERIOR WALL IN END DIASTOLE: 1.2 CM
LEFT VENTRICULAR STROKE VOLUME: 56 ML
LV EF: 60 %
LVSV (TEICH): 56 ML
LYMPHOCYTES # BLD AUTO: 1.87 THOUSANDS/ÂΜL (ref 0.6–4.47)
LYMPHOCYTES NFR BLD AUTO: 34 % (ref 14–44)
MAGNESIUM SERPL-MCNC: 1.7 MG/DL (ref 1.6–2.6)
MCH RBC QN AUTO: 33.4 PG (ref 26.8–34.3)
MCHC RBC AUTO-ENTMCNC: 35.7 G/DL (ref 31.4–37.4)
MCV RBC AUTO: 94 FL (ref 82–98)
MONOCYTES # BLD AUTO: 0.65 THOUSAND/ÂΜL (ref 0.17–1.22)
MONOCYTES NFR BLD AUTO: 12 % (ref 4–12)
MV E'TISSUE VEL-SEP: 11 CM/S
MV PEAK A VEL: 0.93 M/S
MV PEAK E VEL: 57 CM/S
MV STENOSIS PRESSURE HALF TIME: 80 MS
MV VALVE AREA P 1/2 METHOD: 2.75
NEUTROPHILS # BLD AUTO: 2.76 THOUSANDS/ÂΜL (ref 1.85–7.62)
NEUTS SEG NFR BLD AUTO: 49 % (ref 43–75)
NRBC BLD AUTO-RTO: 0 /100 WBCS
PLATELET # BLD AUTO: 202 THOUSANDS/UL (ref 149–390)
PLATELET # BLD AUTO: 216 THOUSANDS/UL (ref 149–390)
PMV BLD AUTO: 9.8 FL (ref 8.9–12.7)
PMV BLD AUTO: 9.9 FL (ref 8.9–12.7)
POTASSIUM SERPL-SCNC: 2.8 MMOL/L (ref 3.5–5.3)
POTASSIUM SERPL-SCNC: 3.3 MMOL/L (ref 3.5–5.3)
PROT SERPL-MCNC: 6.8 G/DL (ref 6.4–8.4)
RBC # BLD AUTO: 3.89 MILLION/UL (ref 3.88–5.62)
RIGHT ATRIUM AREA SYSTOLE A4C: 17.3 CM2
RIGHT VENTRICLE ID DIMENSION: 2.8 CM
SL CV LEFT ATRIUM LENGTH A2C: 4.1 CM
SL CV LV EF: 65
SL CV PED ECHO LEFT VENTRICLE DIASTOLIC VOLUME (MOD BIPLANE) 2D: 87 ML
SL CV PED ECHO LEFT VENTRICLE SYSTOLIC VOLUME (MOD BIPLANE) 2D: 32 ML
SODIUM SERPL-SCNC: 136 MMOL/L (ref 135–147)
TR MAX PG: 28 MMHG
TR PEAK VELOCITY: 2.7 M/S
TRICUSPID VALVE PEAK REGURGITATION VELOCITY: 2.66 M/S
WBC # BLD AUTO: 5.56 THOUSAND/UL (ref 4.31–10.16)

## 2022-10-25 PROCEDURE — 76705 ECHO EXAM OF ABDOMEN: CPT

## 2022-10-25 PROCEDURE — 93306 TTE W/DOPPLER COMPLETE: CPT

## 2022-10-25 PROCEDURE — 97116 GAIT TRAINING THERAPY: CPT

## 2022-10-25 PROCEDURE — 99225 PR SBSQ OBSERVATION CARE/DAY 25 MINUTES: CPT | Performed by: PHYSICIAN ASSISTANT

## 2022-10-25 PROCEDURE — 97163 PT EVAL HIGH COMPLEX 45 MIN: CPT

## 2022-10-25 PROCEDURE — 93306 TTE W/DOPPLER COMPLETE: CPT | Performed by: INTERNAL MEDICINE

## 2022-10-25 PROCEDURE — 84132 ASSAY OF SERUM POTASSIUM: CPT | Performed by: PHYSICIAN ASSISTANT

## 2022-10-25 PROCEDURE — 36415 COLL VENOUS BLD VENIPUNCTURE: CPT | Performed by: PHYSICIAN ASSISTANT

## 2022-10-25 PROCEDURE — 83735 ASSAY OF MAGNESIUM: CPT | Performed by: PHYSICIAN ASSISTANT

## 2022-10-25 PROCEDURE — 85025 COMPLETE CBC W/AUTO DIFF WBC: CPT | Performed by: PHYSICIAN ASSISTANT

## 2022-10-25 PROCEDURE — 80048 BASIC METABOLIC PNL TOTAL CA: CPT | Performed by: PHYSICIAN ASSISTANT

## 2022-10-25 PROCEDURE — 80076 HEPATIC FUNCTION PANEL: CPT | Performed by: PHYSICIAN ASSISTANT

## 2022-10-25 PROCEDURE — 85049 AUTOMATED PLATELET COUNT: CPT | Performed by: PHYSICIAN ASSISTANT

## 2022-10-25 RX ORDER — POTASSIUM CHLORIDE 14.9 MG/ML
20 INJECTION INTRAVENOUS ONCE
Status: COMPLETED | OUTPATIENT
Start: 2022-10-25 | End: 2022-10-25

## 2022-10-25 RX ORDER — POTASSIUM CHLORIDE 20 MEQ/1
40 TABLET, EXTENDED RELEASE ORAL ONCE
Status: COMPLETED | OUTPATIENT
Start: 2022-10-25 | End: 2022-10-25

## 2022-10-25 RX ORDER — FOLIC ACID 1 MG/1
1 TABLET ORAL DAILY
Status: DISCONTINUED | OUTPATIENT
Start: 2022-10-25 | End: 2022-10-26 | Stop reason: HOSPADM

## 2022-10-25 RX ORDER — LANOLIN ALCOHOL/MO/W.PET/CERES
100 CREAM (GRAM) TOPICAL DAILY
Status: DISCONTINUED | OUTPATIENT
Start: 2022-10-25 | End: 2022-10-26 | Stop reason: HOSPADM

## 2022-10-25 RX ORDER — MAGNESIUM SULFATE HEPTAHYDRATE 40 MG/ML
2 INJECTION, SOLUTION INTRAVENOUS ONCE
Status: COMPLETED | OUTPATIENT
Start: 2022-10-25 | End: 2022-10-25

## 2022-10-25 RX ORDER — HYDRALAZINE HYDROCHLORIDE 20 MG/ML
10 INJECTION INTRAMUSCULAR; INTRAVENOUS ONCE
Status: COMPLETED | OUTPATIENT
Start: 2022-10-25 | End: 2022-10-25

## 2022-10-25 RX ADMIN — FLUOXETINE HYDROCHLORIDE 20 MG: 20 CAPSULE ORAL at 09:46

## 2022-10-25 RX ADMIN — HYDRALAZINE HYDROCHLORIDE 10 MG: 20 INJECTION INTRAMUSCULAR; INTRAVENOUS at 17:10

## 2022-10-25 RX ADMIN — MULTIPLE VITAMINS W/ MINERALS TAB 1 TABLET: TAB ORAL at 09:40

## 2022-10-25 RX ADMIN — HEPARIN SODIUM 5000 UNITS: 5000 INJECTION INTRAVENOUS; SUBCUTANEOUS at 22:59

## 2022-10-25 RX ADMIN — POTASSIUM CHLORIDE 20 MEQ: 14.9 INJECTION, SOLUTION INTRAVENOUS at 09:40

## 2022-10-25 RX ADMIN — HEPARIN SODIUM 5000 UNITS: 5000 INJECTION INTRAVENOUS; SUBCUTANEOUS at 13:36

## 2022-10-25 RX ADMIN — LEVETIRACETAM 500 MG: 250 TABLET, FILM COATED ORAL at 17:11

## 2022-10-25 RX ADMIN — PANTOPRAZOLE SODIUM 40 MG: 40 TABLET, DELAYED RELEASE ORAL at 06:12

## 2022-10-25 RX ADMIN — FOLIC ACID 1 MG: 1 TABLET ORAL at 09:47

## 2022-10-25 RX ADMIN — HEPARIN SODIUM 5000 UNITS: 5000 INJECTION INTRAVENOUS; SUBCUTANEOUS at 06:12

## 2022-10-25 RX ADMIN — AMLODIPINE BESYLATE 10 MG: 5 TABLET ORAL at 09:40

## 2022-10-25 RX ADMIN — LEVETIRACETAM 500 MG: 250 TABLET, FILM COATED ORAL at 09:40

## 2022-10-25 RX ADMIN — SODIUM CHLORIDE, SODIUM GLUCONATE, SODIUM ACETATE, POTASSIUM CHLORIDE, MAGNESIUM CHLORIDE, SODIUM PHOSPHATE, DIBASIC, AND POTASSIUM PHOSPHATE 100 ML/HR: .53; .5; .37; .037; .03; .012; .00082 INJECTION, SOLUTION INTRAVENOUS at 03:14

## 2022-10-25 RX ADMIN — Medication 100 MG: at 09:40

## 2022-10-25 RX ADMIN — POTASSIUM CHLORIDE 40 MEQ: 1500 TABLET, EXTENDED RELEASE ORAL at 16:20

## 2022-10-25 RX ADMIN — POTASSIUM CHLORIDE 40 MEQ: 20 TABLET, EXTENDED RELEASE ORAL at 09:40

## 2022-10-25 RX ADMIN — MAGNESIUM SULFATE HEPTAHYDRATE 2 G: 2 INJECTION, SOLUTION INTRAVENOUS at 13:36

## 2022-10-25 NOTE — DISCHARGE INSTR - AVS FIRST PAGE
Follow-up with PCP in about 3 days for suture removal and post hospitalization follow-up  Case management has arranged for home services on discharge    Return to the emergency department for further evaluation with any chest pain/palpitations, shortness of breath, nausea vomiting, abdominal pain, fever/chills

## 2022-10-25 NOTE — PROGRESS NOTES
[place]     Progress Note Kimberly Busby 1955, 78 y o  male MRN: [id number]  Unit/Bed#: OVR 01 Encounter: [id number]  Primary Care Provider: Anna Grande MD   Date and time admitted to hospital: 10/24/2022 10:52 AM    * Ambulatory dysfunction  Assessment & Plan  · 4th fall in 2 months  · Seen in ER 1 week ago and diagnosed with a concussion after fall at that time, reports he has not felt well since  · Typically related to alcohol use, ETOH negative on admission   · Reports he was lightheaded prior to event today, mechanical in nature  · Keppra level pending - patient admits to noncompliance with medications in the last couple of days  · Echo pending  · Orthostatics pending - did have initial drop in systolic BP on lying to sitting on admission, however systolic subsequently increased on standing  Admits to dizziness/lightheadedness prior to falls  · Repeat orthostatics s/p IVF  · PT/OT evaluation given multiple falls  · Anticipate patient will be stable for discharge later today pending RUQ ultrasound/echocardiogram and PT/OT eval    Hypokalemia  Assessment & Plan  · Potassium 2 8  · Admits to poor oral intake last couple of days  Did tolerate breakfast this morning  · Repleted  · Check magnesium  · Recheck potassium later this afternoon    Bilirubin in urine  Assessment & Plan  · RUQ ultrasound pending  · LFTs WNL  · Isolated elevated bilirubin, 1 70 this morning    Hyponatremia  Assessment & Plan  · 133 on admission  · Received IVF overnight  · Admits to poor oral intake last couple of days due to feeling unwell    Currently tolerating diet  · Resolved - sodium 136    Laceration of face  Assessment & Plan  · Above right eye, sutures placed by ED  · Remove sutures in approximately 5 days    History of seizure  Assessment & Plan  · Possibly alcohol related, patient denies history of seizure  · Maintained on Keppra 500 mg b i d   · Patient reports not having taken his medications for number of days after his recent discharge due to not feeling well  · Currently tolerating medications    Stage 3 chronic kidney disease [facility])  Assessment & Plan  Lab Results   Component Value Date    EGFR 63 10/25/2022    EGFR 55 10/24/2022    EGFR 55 10/15/2022    CREATININE 1 19 10/25/2022    CREATININE 1 32 (H) 10/24/2022    CREATININE 1 32 (H) 10/15/2022     · Baseline 1 10-1 30  · 1 32 on admission  · Creatinine improved to 1 19 s/p IVF    Hypertension  Assessment & Plan  · Maintained on amlodipine 10 mg daily  · Blood pressure stable  · Follow-up orthostatic BP Q shift    GERD (gastroesophageal reflux disease)  Assessment & Plan  · Continue PPI    Alcohol abuse  Assessment & Plan  · CIWA protocol  · Denies history of withdrawal or withdrawal seizures but does not appear to be accurate historian  · Thiamine, folic acid, multivitamin    VTE Pharmacologic Prophylaxis: VTE Score: 3 Moderate Risk (Score 3-4) - Pharmacological DVT Prophylaxis Ordered: heparin  Patient Centered Rounds: I performed bedside rounds with nursing staff today  Discussions with Specialists or Other Care Team Provider: CM    Education and Discussions with Family / Patient: Patient declined call to   Time Spent for Care: 30 minutes  More than 50% of total time spent on counseling and coordination of care as described above  Current Length of Stay: 0 day(s)  Current Patient Status: Observation   Certification Statement: The patient will continue to require additional inpatient hospital stay due to PT/OT evaluation, follow-up echo/ultrasound results  Discharge Plan: Anticipate discharge later today or tomorrow to discharge location to be determined pending rehab evaluations  Code Status: Level 1 - Full Code    Subjective:   Patient states he overall feels well this morning  Admits that he has been feeling unwell the last couple of days at home and was not taking any of his medications    Denies chest pain/palpitations, shortness of breath, nausea vomiting, abdominal pain  Denies any fever/chills  States that when he was leaving the hospital yesterday he felt dizzy lightheaded on ambulation  Objective:     Vitals:   Temp (24hrs), Av 9 °F (36 6 °C), Min:97 9 °F (36 6 °C), Max:97 9 °F (36 6 °C)    Temp:  [97 9 °F (36 6 °C)] 97 9 °F (36 6 °C)  HR:  [74-97] 75  Resp:  [16-24] 16  BP: (110-167)/() 167/82  SpO2:  [90 %-98 %] 95 %  Body mass index is 27 98 kg/m²  Input and Output Summary (last 24 hours): Intake/Output Summary (Last 24 hours) at 10/25/2022 0919  Last data filed at 10/25/2022 8054  Gross per 24 hour   Intake 1190 ml   Output 220 ml   Net 970 ml       Physical Exam:   Physical Exam  Vitals and nursing note reviewed  Constitutional:       Appearance: He is well-developed  Comments: No acute distress   HENT:      Head: Normocephalic and atraumatic  Comments: Laceration with sutures above right orbit  Eyes:      General: No scleral icterus  Extraocular Movements: Extraocular movements intact  Conjunctiva/sclera: Conjunctivae normal    Cardiovascular:      Rate and Rhythm: Normal rate and regular rhythm  Heart sounds: S1 normal and S2 normal  No murmur heard  Pulmonary:      Effort: Pulmonary effort is normal  No respiratory distress  Breath sounds: Normal breath sounds  No wheezing, rhonchi or rales  Abdominal:      General: Bowel sounds are normal       Palpations: Abdomen is soft  Tenderness: There is no abdominal tenderness  There is no guarding or rebound  Musculoskeletal:      Cervical back: Normal range of motion  Comments: Able to move upper/lower extremities bilaterally, no edema   Skin:     General: Skin is warm and dry  Neurological:      Mental Status: He is alert and oriented to person, place, and time     Psychiatric:         Mood and Affect: Mood normal          Speech: Speech normal          Behavior: Behavior normal  Additional Data:     Labs:  Results from last 7 days   Lab Units 10/25/22  0723   WBC Thousand/uL 5 56   HEMOGLOBIN g/dL 13 0   HEMATOCRIT % 36 4*   PLATELETS Thousands/uL 202  216   NEUTROS PCT % 49   LYMPHS PCT % 34   MONOS PCT % 12   EOS PCT % 4     Results from last 7 days   Lab Units 10/25/22  0723   SODIUM mmol/L 136   POTASSIUM mmol/L 2 8*   CHLORIDE mmol/L 102   CO2 mmol/L 27   BUN mg/dL 16   CREATININE mg/dL 1 19   ANION GAP mmol/L 7   CALCIUM mg/dL 8 2*   ALBUMIN g/dL 3 3*   TOTAL BILIRUBIN mg/dL 1 70*   ALK PHOS U/L 96   ALT U/L 55   AST U/L 35   GLUCOSE RANDOM mg/dL 94     Results from last 7 days   Lab Units 10/24/22  1303   INR  0 96     Results from last 7 days   Lab Units 10/24/22  1607   POC GLUCOSE mg/dl 193*               Lines/Drains:  Invasive Devices  Report    Peripheral Intravenous Line  Duration           Peripheral IV 10/24/22 Distal;Right;Upper;Ventral (anterior) Arm <1 day                      Imaging: Reviewed radiology reports from this admission including: chest xray and CT head    Recent Cultures (last 7 days):         Last 24 Hours Medication List:   Current Facility-Administered Medications   Medication Dose Route Frequency Provider Last Rate   • acetaminophen  650 mg Oral Q6H PRN Fadumo BLACKWOOD PA-C     • amLODIPine  10 mg Oral Daily Fadumo BLACKWOOD PA-C     • FLUoxetine  20 mg Oral QAM Fadumo BLACKWOOD PA-C     • folic acid  1 mg Oral Daily Amos You PA-C     • heparin (porcine)  5,000 Units Subcutaneous [facility] Fadumo BLACKWOOD PA-C     • levETIRAcetam  500 mg Oral BID Basiamaricel BLACKWOOD PA-C     • multi-electrolyte  100 mL/hr Intravenous Continuous Fadumo BLACKWOOD PA-C 100 mL/hr (10/25/22 0314)   • multivitamin-minerals  1 tablet Oral Daily [facility] GLORIA Aguayo     • ondansetron  4 mg Intravenous Q6H PRN Fadumo BLACKWOOD PA-C     • pantoprazole  40 mg Oral Early Morning Fadumo BLACKWOOD PA-C     • potassium chloride  40 mEq Oral Once Amos You PA-C • potassium chloride  20 mEq Intravenous Once Esmer Joseph PA-C     • thiamine  100 mg Oral Daily Esmer Joseph [place]          Today, Patient Was Seen By: Esmer Joseph    **Please Note: This note may have been constructed using a voice recognition system  **

## 2022-10-25 NOTE — ASSESSMENT & PLAN NOTE
· 133 on admission  · Received IVF overnight  · Admits to poor oral intake last couple of days due to feeling unwell    Currently tolerating diet  · Resolved - sodium 136

## 2022-10-25 NOTE — ASSESSMENT & PLAN NOTE
· 4th fall in 2 months  · Seen in ER 1 week ago and diagnosed with a concussion after fall at that time, reports he has not felt well since  · Typically related to alcohol use, ETOH negative on admission   · Reports he was lightheaded prior to event today, mechanical in nature  · Keppra level pending - patient admits to noncompliance with medications in the last couple of days  · Echo pending  · Orthostatics pending - did have initial drop in systolic BP on lying to sitting on admission, however systolic subsequently increased on standing    Admits to dizziness/lightheadedness prior to falls  · Repeat orthostatics s/p IVF  · PT/OT evaluation given multiple falls  · Anticipate patient will be stable for discharge later today pending RUQ ultrasound/echocardiogram and PT/OT eval

## 2022-10-25 NOTE — CASE MANAGEMENT
Case Management Progress Note    Patient name Gareth Ponce  Location EMIR POOL/EMIR MRN [id number]  : 1955 Date 10/25/2022       LOS (days): 0  Geometric Mean LOS (GMLOS) (days):   Days to GMLOS:        OBJECTIVE:        Current admission status: Observation  Preferred Pharmacy:   [id number] [address]  Phone: [phone] Fax: [phone]    Primary Care Provider: Max Callejas MD    Primary Insurance: MEDICARE  Secondary Insurance: [facility]    PROGRESS NOTE: [facility]  Erving Face approved through [address]  Walker delivered to room and signed for

## 2022-10-25 NOTE — ASSESSMENT & PLAN NOTE
· Potassium 2 8  · Admits to poor oral intake last couple of days    Did tolerate breakfast this morning  · Repleted  · Check magnesium  · Recheck potassium later this afternoon

## 2022-10-25 NOTE — PHYSICAL THERAPY NOTE
PHYSICAL THERAPY Evaluation    Performed at least 2 patient identifiers during session:  Patient Active Problem List   Diagnosis    Alcohol abuse    Anxiety    GERD (gastroesophageal reflux disease)    Hypertension    Ambulatory dysfunction    Stage 3 chronic kidney disease ([place] [address] )    History of seizure    Laceration of face    Hyponatremia    Bilirubin in urine    Hypokalemia       Past Medical History:   Diagnosis Date    Alcohol abuse     Anxiety     Cancer ([email or web address] )     kidney    Depression     GERD (gastroesophageal reflux disease)     Hypertension        Past Surgical History:   Procedure Laterality Date    NEPHRECTOMY Left     UMBILICAL HERNIA REPAIR          10/25/22 1022   PT Last Visit   PT Visit Date 10/25/22   Note Type   Note type Evaluation   Pain Assessment   Pain Assessment Tool 0-10   Pain Score No Pain   Restrictions/Precautions   Weight Bearing Precautions Per Order No   Home Living   Type of Home Apartment  ([place] [address])   Home Layout One level;Elevator   Prior Function   Level of [place] Independent with ADLs; Independent with functional mobility  (pt does not drive;  cleaning lady takes pt out to grocery shopping and she takes pt to appointments if they fall on the day she is there, otherwise pt takes the bus)   Lives With [address] [place] Help From Friend(s)  (cleaning lady)   Falls in the last 6 months 5 to 10  ("about 5 maybe")   General   Family/Caregiver Present No   Cognition   Overall Cognitive Status WFL   Arousal/Participation Alert   Orientation Level Oriented X4   Memory Within functional limits   Following Commands Follows one step commands without difficulty   Comments [place]   Subjective   Subjective I was wondering when you would be here to tell me when I can start therapy and see what you guys can do for me because I keep falling   RUE Assessment   RUE Assessment WFL   LUE Assessment   LUE Assessment WFL   RLE Assessment   RLE Assessment WFL   LLE Assessment   LLE Assessment WFL   Coordination   Movements are Fluid and Coordinated 1   Rapid Alternating Movements Intact  (intact for B toe taps)   Bed Mobility   Supine to Sit 6  Modified independent   Sit to Supine 6  Modified independent   Transfers   Sit to Stand 5  Supervision   Additional items   (with and without RW)   Stand to Sit 5  Supervision   Ambulation/Elevation   Gait pattern Decreased foot clearance   Gait Assistance 5  Supervision   Additional items Assist x 1   Assistive Device Rolling walker   Distance 100ft with RW, no LOB or unsteadiness;  pt states he will use RW for all mobility at home;  CM aware pt will need RW for home use   Balance   Static Sitting Fair   Dynamic Sitting Fair   Static Standing Fair   Dynamic Standing Fair   Ambulatory Fair -  (RW)   Activity Tolerance   Activity Tolerance   (no adverse effects to PT noted)   Medical Staff Made Aware DANIEL Collins   Nurse Made Aware Nurse in ED aware   Assessment   Prognosis Guarded   Problem List Impaired balance;Decreased mobility; Impaired hearing;Obesity; Decreased skin integrity   Assessment Pt is a 78 y o  male who presented to ED 10/24/22 with c/o falls with dizziness  Dx:  Ambulatory dysfunction, facial laceration, sutures intact, fall  Comorbidities affecting pt's physical performance at time of assessment include: ETOH abuse (pt will continue to be a high fall risk if he continues to drink), IV  Personal factors affecting pt at time of IE include: poor understanding, decreased immediate recall, [place]  PLOF and home set up listed above  Upon evaluation: Pt mod I for bed mobility, S for sit to stand, and S for ambulation with RW  Full objective findings from PT assessment regarding body systems outlined above  Current limitations include impaired balance, decreased endurance/activity tolerance, gait deviations, fall risk and need for AD   Pt's clinical presentation is currently unstable/unpredictable seen in pt's presentation of continuous monitoring in ED, continued work-up pending  Pt would benefit from continued PT while in hospital and follow up with HHCPT at D/C to increase strength, balance, endurance, independence with funcitonal mobility to return to PLOF, maximize independence, decrease caregiver burden and improve quality of life  Gait training including verbal cueing, proper use of AD  The patient's AM-PAC Basic Mobility Inpatient Short Form Raw Score is 23  A Raw score of greater than 17 suggests the patient may benefit from discharge to home  Please also refer to the recommendation of the Physical Therapist for safe discharge planning  Barriers to Discharge None  (single level home with elevator access)   Goals   Patient Goals to start PT   STG Expiration Date 11/08/22   Short Term Goal #1 pt will be able to demo:  mod I sit to/from standing with RW, mod I to ambulate 150ft with RW; to return to apt at mod I level, improve quality of life and decrease risk for falls   Plan   Treatment/Interventions ADL retraining;Functional transfer training;LE strengthening/ROM; Elevations; Endurance training; Therapeutic exercise;Patient/family training;Bed mobility; Equipment eval/education;Gait training; Compensatory technique education;Continued evaluation;Spoke to nursing;Spoke to case management;OT   PT Frequency 2-3x/wk   Recommendation   PT Discharge Recommendation Home with home health rehabilitation  (use of RW for all mobility)   Equipment Recommended Laly Lara 435   Turning in Bed Without Bedrails 4   Lying on Back to Sitting on Edge of Flat Bed 4   Moving Bed to Chair 4   Standing Up From Chair 4   Walk in Room 4   Climb 3-5 Stairs 3   Basic Mobility Inpatient Raw Score 23   Basic Mobility Standardized Score 50 88   Highest Level Of Mobility   JH-HLM Goal 7: Walk 25 feet or more   JH-HLM Achieved 7: Walk 25 feet or more     Shani Fong        Patient Name: Donta Gonzalez  PWBJS'B Date: 10/25/2022

## 2022-10-25 NOTE — ASSESSMENT & PLAN NOTE
Lab Results   Component Value Date    EGFR 63 10/25/2022    EGFR 55 10/24/2022    EGFR 55 10/15/2022    CREATININE 1 19 10/25/2022    CREATININE 1 32 (H) 10/24/2022    CREATININE 1 32 (H) 10/15/2022     · Baseline 1 10-1 30  · 1 32 on admission  · Creatinine improved to 1 19 s/p IVF

## 2022-10-25 NOTE — ASSESSMENT & PLAN NOTE
· [facility] protocol  · Denies history of withdrawal or withdrawal seizures but does not appear to be accurate historian  · Thiamine, folic acid, multivitamin

## 2022-10-25 NOTE — PLAN OF CARE
Problem: PHYSICAL THERAPY ADULT  Goal: Performs mobility at highest level of function for planned discharge setting  See evaluation for individualized goals  Description: Treatment/Interventions: ADL retraining, Functional transfer training, LE strengthening/ROM, Elevations, Endurance training, Therapeutic exercise, Patient/family training, Bed mobility, Equipment eval/education, Gait training, Compensatory technique education, Continued evaluation, Spoke to nursing, Spoke to case management, OT  Equipment Recommended: Priyanka Saba       See flowsheet documentation for full assessment, interventions and recommendations  Note: Prognosis: Guarded  Problem List: Impaired balance, Decreased mobility, Impaired hearing, Obesity, Decreased skin integrity  Assessment: Pt is a 78 y o  male who presented to ED 10/24/22 with c/o falls with dizziness  Dx:  Ambulatory dysfunction, facial laceration, sutures intact, fall  Comorbidities affecting pt's physical performance at time of assessment include: ETOH abuse (pt will continue to be a high fall risk if he continues to drink), IV  Personal factors affecting pt at time of IE include: poor understanding, decreased immediate recall, [place]  PLOF and home set up listed above  Upon evaluation: Pt mod I for bed mobility, S for sit to stand, and S for ambulation with RW  Full objective findings from PT assessment regarding body systems outlined above  Current limitations include impaired balance, decreased endurance/activity tolerance, gait deviations, fall risk and need for AD  Pt's clinical presentation is currently unstable/unpredictable seen in pt's presentation of continuous monitoring in ED, continued work-up pending  Pt would benefit from continued PT while in hospital and follow up with HHCPT at D/C to increase strength, balance, endurance, independence with funcitonal mobility to return to PLOF, maximize independence, decrease caregiver burden and improve quality of life  Gait training including verbal cueing, proper use of AD  The patient's AM-PAC Basic Mobility Inpatient Short Form Raw Score is 23  A Raw score of greater than 17 suggests the patient may benefit from discharge to home  Please also refer to the recommendation of the Physical Therapist for safe discharge planning  Barriers to Discharge: None (single level home with elevator access)     PT Discharge Recommendation: Home with home health rehabilitation (use of RW for all mobility)    See flowsheet documentation for full assessment

## 2022-10-25 NOTE — DISCHARGE SUMMARY
[place]  Discharge- Dallas Mallory 1955, 78 y o  male MRN: [id number]  Unit/Bed#: -01 Encounter: [id number]  Primary Care Provider: Óscar Ely MD   Date and time admitted to hospital: 10/24/2022 10:52 AM    * Ambulatory dysfunction-resolved as of 10/25/2022  Assessment & Plan  · 4th fall in 2 months  · Seen in ER 1 week ago and diagnosed with a concussion after fall at that time, reports he has not felt well since    · Typically related to alcohol use, ETOH negative on admission   · Reports he was lightheaded prior to event today, mechanical in nature  · Keppra level pending - patient admits to noncompliance with medications in the last couple of days  · Echo: EF 92% grade 1 diastolic dysfunction  · PT/OT evaluation given multiple falls - recommending HHC and walker provided by CM  · Stable for discharge home  · Counseled alcohol cessation  · Will need outpatient follow up in about 3 days for suture removal    Hypokalemia  Assessment & Plan  · Potassium 2 8 s/p repletion, improved to 3 3   · Additional 40 meq KDUR given following repeat K  · Tolerating diet without issue  · Resolved - K 3 9 this AM    Laceration of face  Assessment & Plan  · Above right eye, sutures placed by ED  · Remove sutures in approximately 3-5 days    History of seizure  Assessment & Plan  · Possibly alcohol related, patient denies history of seizure  · Maintained on Keppra 500 mg b i d   · Patient reports not having taken his medications for number of days after his recent discharge due to not feeling well  · Currently tolerating medications    Stage 3 chronic kidney disease [facility])  Assessment & Plan  Lab Results   Component Value Date    EGFR 65 10/26/2022    EGFR 63 10/25/2022    EGFR 55 10/24/2022    CREATININE 1 15 10/26/2022    CREATININE 1 19 10/25/2022    CREATININE 1 32 (H) 10/24/2022     · Baseline 1 10-1 30  · 1 32 on admission  · Creatinine improved to 1 15 s/p IVF    Hypertension  Assessment & Plan  · Maintained on amlodipine 10 mg daily  · Elevated BP following admit, no evidence of alcohol withdrawal at this time  · Improved s/p 5 mg lisinopril    GERD (gastroesophageal reflux disease)  Assessment & Plan  · Continue PPI    Alcohol abuse  Assessment & Plan  · CIWA protocol  · Denies history of withdrawal or withdrawal seizures but does not appear to be accurate historian  · Thiamine, folic acid, multivitamin    Bilirubin in urine-resolved as of 10/25/2022  Assessment & Plan  · RUQ ultrasound: hepatic steatosis  · LFTs WNL  · Isolated elevated bilirubin, mostly indirect    Hyponatremia-resolved as of 10/25/2022  Assessment & Plan  · 133 on admission  · Received IVF overnight  · Admits to poor oral intake last couple of days due to feeling unwell  Currently tolerating diet  · Resolved - sodium 136      Medical Problems             Resolved Problems  Date Reviewed: 10/25/2022          Resolved    * (Principal) Ambulatory dysfunction 10/25/2022     Resolved by  Nesha Mccabe PA-C    Hyponatremia 10/25/2022     Resolved by  Nesha Mccabe PA-C    Bilirubin in urine 10/25/2022     Resolved by  Nesha Mccabe PA-C              Discharging Physician / Practitioner: Nesha Mccabe  PCP: Nessa Garcia MD  Admission Date:   Admission Orders (From admission, onward)     Ordered        10/25/22 2139  Inpatient Admission  Once            10/24/22 1608  Place in Observation  Once                      Discharge Date: 10/26/22    Consultations During Hospital Stay:  · None     Procedures Performed:   US right upper quadrant   Final Result by Yue Maguire MD (10/25 1949)      Hepatic steatosis  Workstation performed: [id number]         TRAUMA - CT head wo contrast   Final Result by Beatris Allen MD (10/24 1140)      No acute intracranial abnormality  Chronic microangiopathic changes                    Workstation performed: [id number]         TRAUMA - CT spine cervical wo contrast   Final Result by Kat Neil MD (10/24 1159)      No cervical spine fracture or traumatic malalignment  Workstation performed: [id number]         TRAUMA - CT facial bones wo contrast   Final Result by Kat Neil MD (10/24 1149)   No facial bone fracture  Workstation performed: [id number]         XR Trauma chest portable   Final Result by Ulises Stevens MD (10/24 1231)      No acute cardiopulmonary disease  Findings are stable            Workstation performed: [id number]           ·     Significant Findings / Test Results:   · CTH:No acute intracranial abnormality  Chronic microangiopathic changes  · CT C Spine: No cervical spine fracture or traumatic malalignment  · CT facial bones: no facial bone fracture   · RUQ US:     Incidental Findings:   · None     Test Results Pending at Discharge (will require follow up): · None      Outpatient Tests Requested:  · None     Complications:  None     Reason for Admission:  [facility] AND [place] Course:   Marychuy Gimenez is a 78 y o  male patient with PMH of alcohol use disorder, ambulatory dysfunction with frequent falls including recent ED visit for fall 1 week ago, GERD, seizures who originally presented to the hospital on 10/24/2022 due to mechanical fall at home with associated lightheadedness  The patient was found with superficial laceration above R eye which was sutured in ED; sutures should be removed 5 days following placement  CTH, CT C-spine, CXR, and CT facial bones obtained in ED patient were without evidence of additional traumatic injury  The patient was admitted for IVF, observation, Echo, and PT/OT evaluation  Echo showed EF 65% G1DD no significant valvular disease  The patient was evaluated by PT/OT who recommended discharge to home with home health care  As patient's labs/electrolytes and vitals stabilized throughout admission, the patient was stable for discharge on 10/26   Prior to discharge patient was without additional complaints or symptoms  Please see above list of diagnoses and related plan for additional information  Condition at Discharge: stable    Discharge Day Visit / Exam:   Subjective:  Denies any complaints  Kaylene Cabrales for discharge home  Vitals: Blood Pressure: 154/83 (10/26/22 0900)  Pulse: 86 (10/26/22 0700)  Temperature: 99 2 °F (37 3 °C) (10/26/22 0700)  Temp Source: Oral (10/26/22 0700)  Respirations: 18 (10/26/22 0700)  Height: 5' 8" (172 7 cm) (10/25/22 0805)  Weight - Scale: 83 5 kg (184 lb) (10/25/22 0805)  SpO2: 94 % (10/25/22 2309)  Exam:   Physical Exam  Vitals and nursing note reviewed  Constitutional:       Appearance: He is well-developed  Comments: No acute distress   HENT:      Head: Normocephalic and atraumatic  Comments: Sutures intact above R orbit  Eyes:      General: No scleral icterus  Extraocular Movements: Extraocular movements intact  Conjunctiva/sclera: Conjunctivae normal    Cardiovascular:      Rate and Rhythm: Normal rate and regular rhythm  Heart sounds: S1 normal and S2 normal  No murmur heard  Pulmonary:      Effort: Pulmonary effort is normal  No respiratory distress  Breath sounds: Normal breath sounds  No wheezing, rhonchi or rales  Abdominal:      General: Bowel sounds are normal       Palpations: Abdomen is soft  Tenderness: There is no abdominal tenderness  There is no guarding or rebound  Musculoskeletal:      Cervical back: Normal range of motion  Comments: Able to move upper/lower ext bilaterally, no edema   Skin:     General: Skin is warm and dry  Neurological:      Mental Status: He is alert and oriented to person, place, and time  Psychiatric:         Mood and Affect: Mood normal          Speech: Speech normal          Behavior: Behavior normal           Discussion with Family: Patient declined call to        Discharge instructions/Information to patient and family: See after visit summary for information provided to patient and family  Provisions for Follow-Up Care:  See after visit summary for information related to follow-up care and any pertinent home health orders  Disposition:   Home with VNA Services (Reminder: Complete face to face encounter)    Planned Readmission: None      Discharge Statement:  I spent 55 minutes discharging the patient  This time was spent on the day of discharge  I had direct contact with the patient on the day of discharge  Greater than 50% of the total time was spent examining patient, answering all patient questions, arranging and discussing plan of care with patient as well as directly providing post-discharge instructions  Additional time then spent on discharge activities  Discharge Medications:  See after visit summary for reconciled discharge medications provided to patient and/or family        **Please Note: This note may have been constructed using a voice recognition system**

## 2022-10-25 NOTE — ASSESSMENT & PLAN NOTE
· Possibly alcohol related, patient denies history of seizure  · Maintained on Keppra 500 mg b i d   · Patient reports not having taken his medications for number of days after his recent discharge due to not feeling well  · Currently tolerating medications

## 2022-10-25 NOTE — ASSESSMENT & PLAN NOTE
· Potassium 2 8 s/p repletion, improved to 3 3   · Additional 40 meq KDUR given following repeat K  · Tolerating diet without issue  · Resolved - K 3 9 this AM

## 2022-10-25 NOTE — CASE MANAGEMENT
Case Management Discharge Planning Note    Patient name Gareth Ponce  Location /-48 MRN [id number]  : 1955 Date 10/25/2022       Current Admission Date: 10/24/2022  Current Admission Diagnosis:Alcohol abuse   Patient Active Problem List    Diagnosis Date Noted   • Hypokalemia 10/25/2022   • Stage 3 chronic kidney disease ([address] ) 10/24/2022   • History of seizure 10/24/2022   • Laceration of face 10/24/2022   • Alcohol abuse    • Anxiety    • GERD (gastroesophageal reflux disease)    • Hypertension       LOS (days): 0  Geometric Mean LOS (GMLOS) (days):   Days to GMLOS:     OBJECTIVE:            Current admission status: Observation   Preferred Pharmacy:   [id number] [address]  Phone: [phone] Fax: [phone]    Primary Care Provider: Max Callejas MD    Primary Insurance: MEDICARE  Secondary Insurance: [employer] [facility]    DISCHARGE DETAILS:    Discharge planning discussed with[de-identified] patient      Additional Comments: plan for d/c today after US completed  Met with pt to discuss same  Pt needs Lyft home  Transportation waiver signed by pt  He will be returning home to [address] [place] [address] phone number for  nurse, Merry Burch

## 2022-10-25 NOTE — PLAN OF CARE
Problem: Potential for Falls  Goal: Patient will remain free of falls  Description: INTERVENTIONS:  - Educate patient/family on patient safety including physical limitations  - Instruct patient to call for assistance with activity   - Consult OT/PT to assist with strengthening/mobility   - Keep Call bell within reach  - Keep bed low and locked with side rails adjusted as appropriate  - Keep care items and personal belongings within reach  - Initiate and maintain comfort rounds  - Make Fall Risk Sign visible to staff  - Offer Toileting every 2 Hours, in advance of need  - Initiate/Maintain alarm  - Obtain necessary fall risk management equipment  - Apply yellow socks and bracelet for high fall risk patients  - Consider moving patient to room near nurses station  Outcome: Progressing     Problem: MOBILITY - ADULT  Goal: Maintain or return to baseline ADL function  Description: INTERVENTIONS:  -  Assess patient's ability to carry out ADLs; assess patient's baseline for ADL function and identify physical deficits which impact ability to perform ADLs (bathing, care of mouth/teeth, toileting, grooming, dressing, etc )  - Assess/evaluate cause of self-care deficits   - Assess range of motion  - Assess patient's mobility; develop plan if impaired  - Assess patient's need for assistive devices and provide as appropriate  - Encourage maximum independence but intervene and supervise when necessary  - Involve family in performance of ADLs  - Assess for home care needs following discharge   - Consider OT consult to assist with ADL evaluation and planning for discharge  - Provide patient education as appropriate  Outcome: Progressing  Goal: Maintains/Returns to pre admission functional level  Description: INTERVENTIONS:  - Perform BMAT or MOVE assessment daily    - Set and communicate daily mobility goal to care team and patient/family/caregiver     - Collaborate with rehabilitation services on mobility goals if consulted  - Perform Range of Motion 5 times a day  - Reposition patient every 2 hours    - Dangle patient 3 times a day  - Stand patient 3 times a day  - Ambulate patient 3 times a day  - Out of bed to chair 3 times a day   - Out of bed for meals 3 times a day  - Out of bed for toileting  - Record patient progress and toleration of activity level   Outcome: Progressing

## 2022-10-25 NOTE — ASSESSMENT & PLAN NOTE
Lab Results   Component Value Date    EGFR 65 10/26/2022    EGFR 63 10/25/2022    EGFR 55 10/24/2022    CREATININE 1 15 10/26/2022    CREATININE 1 19 10/25/2022    CREATININE 1 32 (H) 10/24/2022     · Baseline 1 10-1 30  · 1 32 on admission  · Creatinine improved to 1 15 s/p IVF

## 2022-10-25 NOTE — CASE MANAGEMENT
Case Management Assessment & Discharge Planning Note    Patient name Pablo Nevarez  Location EMIR POOL/EMIR MRN [id number]  : 1955 Date 10/25/2022       Current Admission Date: 10/24/2022  Current Admission Diagnosis:Ambulatory dysfunction   Patient Active Problem List    Diagnosis Date Noted   • Hypokalemia 10/25/2022   • Ambulatory dysfunction 10/24/2022   • Stage 3 chronic kidney disease ([address] ) 10/24/2022   • History of seizure 10/24/2022   • Laceration of face 10/24/2022   • Hyponatremia 10/24/2022   • Bilirubin in urine 10/24/2022   • Alcohol abuse    • Anxiety    • GERD (gastroesophageal reflux disease)    • Hypertension       LOS (days): 0  Geometric Mean LOS (GMLOS) (days):   Days to GMLOS:     OBJECTIVE:              Current admission status: Observation  Referral Reason: DME, JONA    Preferred Pharmacy:   [id number] [address], [address] [place] [address] Drive [address]  Phone: [phone] Fax: [phone]    Primary Care Provider: Robbie Anaya MD    Primary Insurance: MEDICARE  Secondary Insurance: [facility]    ASSESSMENT:  Active Health Care Proxies    There are no active Health Care Proxies on file         Advance Directives  Does patient have a [address]?: No  Was patient offered paperwork?: Yes (declined)  Does patient currently have a Health Care decision maker?: Yes, please see Health Care Proxy section  Does patient have Advance Directives?: No  Was patient offered paperwork?: Yes (declined)  Primary Contact: Jack Nunez Notice Signed: 10/25/22    Readmission Root Cause  30 Day Readmission: No    Patient Information  Admitted from[de-identified] Home  Mental Status: Alert  During Assessment patient was accompanied by: Not accompanied during assessment  Assessment information provided by[de-identified] Patient  Primary Caregiver: Self  Support Systems: Self, [address] [place] [address] Avenue of Residence: [address] [place] [address] do you live in?: [address] [place] [address] access options  Select all that apply : No steps to enter home  Type of Current Residence: Apartment  Floor Level: 2  Upon entering residence, is there a bedroom on the main floor (no further steps)?: Yes  Upon entering residence, is there a bathroom on the main floor (no further steps)?: Yes  In the last 12 months, was there a time when you were not able to pay the mortgage or rent on time?: No  In the last 12 months, how many places have you lived?: 1  In the last 12 months, was there a time when you did not have a steady place to sleep or slept in a shelter (including now)?: No  Homeless/housing insecurity resource given?: N/A  Living Arrangements: Lives Alone  Is patient a ?: No    Activities of Daily Living Prior to Admission  Functional Status: Independent  Completes ADLs independently?: Yes  Ambulates independently?: No  Level of ambulatory dependence: Assistance  Does patient use assisted devices?: No  Does patient currently own DME?: No  Does patient have a history of Outpatient Therapy (PT/OT)?: No  Does the patient have a history of Short-Term Rehab?: No  Does patient have a history of HHC?: No  Does patient currently have [facility]?: No         Patient Information Continued  Income Source: Unemployed  Does patient have prescription coverage?: Yes  Within the past 12 months, you worried that your food would run out before you got the money to buy more : Never true  Within the past 12 months, the food you bought just didn't last and you didn't have money to get more : Never true  Food insecurity resource given?: N/A  Does patient receive dialysis treatments?: No  Does patient have a history of substance abuse?: Yes  Historical substance use preference: Alcohol/ETOH  History of Withdrawal Symptoms: Denies past symptoms  Is patient currently in treatment for substance abuse?: No  Patient declined treatment information    Does patient have a history of Mental Health Diagnosis?: No         Means of Transportation  [place] [facility] to [facility][de-identified] [address]  In the past 12 months, has lack of transportation kept you from medical appointments or from getting medications?: No  In the past 12 months, has lack of transportation kept you from meetings, work, or from getting things needed for daily living?: No  Was application for public transport provided?: N/A        DISCHARGE DETAILS:    Discharge planning discussed with[de-identified] patient  Freedom of Choice: Yes  Comments - Freedom of Choice: discussed walker need and HHC  CM contacted family/caregiver?: No- see comments (pt alert and indpt in room)  Were Treatment Team discharge recommendations reviewed with patient/caregiver?: Yes  Did patient/caregiver verbalize understanding of patient care needs?: Yes       Contacts  Reason/Outcome: Discharge Planning, Referral    Requested [address]         Is the patient interested in [place] [address] at discharge?: Yes  [address] requested[de-identified] Occupational Therapy, Physical [address] Name[de-identified] Other ([address] [place] [address])  [address] [place] [address] Provider[de-identified] PCP  [address] Needed[de-identified] Evaluate Functional Status and Safety, Gait/ADL Training, Strengthening/Theraputic Exercises to Improve Function  Homebound Criteria Met[de-identified] Uses an Assist Device (i e  cane, walker, etc), Requires the Assistance of Another Person for Safe Ambulation or to Leave the Home  Supporting Clincal Findings[de-identified] Limited Endurance, Fatigues Easliy in [employer]    DME Referral Provided  Referral made for DME?: Yes  DME referral completed for the following items[de-identified] Wilton Castelan  DME Supplier Name[de-identified] [employer]    Other Referral/Resources/Interventions Provided:  Interventions: DME, HHC  Referral Comments: Pt is recommended HHC and a walker  Walker ordered and to be delivered to pt   [place] [address] referrals sent            Discharge Destination Plan[de-identified] Home with [place] at Discharge : Vinicius Espinosa Additional Comments: Cm met with pt in ED  Pt lives alone at apartment at [facility]  He has no DME at home but is recommended a walker  Pt has no hx of HHC/STR/PT/OT  he has a hx of ETOH and has had treatment before but states he is not interested in treatment information at this time  Pt/Ot saw pt in ED and recommended HHC and a walker  Eliz Erm was ordered and [address] referrals sent  Pt states that he has a cleaning lady that takes him to get groceries 3 times a month and he takes the bus to appointments  Pt uses [facility] [address]  and sees Dr Michael Bloom

## 2022-10-25 NOTE — ASSESSMENT & PLAN NOTE
· [facility] protocol  · Denies history of withdrawal or withdrawal seizures but does not appear to be accurate historian  · Thiamine, folic acid, multivitamin

## 2022-10-25 NOTE — ASSESSMENT & PLAN NOTE
· 4th fall in 2 months  · Seen in ER 1 week ago and diagnosed with a concussion after fall at that time, reports he has not felt well since    · Typically related to alcohol use, ETOH negative on admission   · Reports he was lightheaded prior to event today, mechanical in nature  · Keppra level pending - patient admits to noncompliance with medications in the last couple of days  · Echo: EF 85% grade 1 diastolic dysfunction  · PT/OT evaluation given multiple falls - recommending HHC and walker provided by CM  · Stable for discharge home  · Counseled alcohol cessation  · Will need outpatient follow up in about 3 days for suture removal

## 2022-10-25 NOTE — ASSESSMENT & PLAN NOTE
· Maintained on amlodipine 10 mg daily  · Elevated BP following admit, no evidence of alcohol withdrawal at this time  · Improved s/p 5 mg lisinopril

## 2022-10-26 VITALS
RESPIRATION RATE: 18 BRPM | SYSTOLIC BLOOD PRESSURE: 154 MMHG | TEMPERATURE: 99.2 F | WEIGHT: 184 LBS | OXYGEN SATURATION: 94 % | BODY MASS INDEX: 27.89 KG/M2 | HEIGHT: 68 IN | HEART RATE: 86 BPM | DIASTOLIC BLOOD PRESSURE: 83 MMHG

## 2022-10-26 LAB
ANION GAP SERPL CALCULATED.3IONS-SCNC: 8 MMOL/L (ref 4–13)
BASOPHILS # BLD AUTO: 0.01 THOUSANDS/ÂΜL (ref 0–0.1)
BASOPHILS NFR BLD AUTO: 0 % (ref 0–1)
BUN SERPL-MCNC: 13 MG/DL (ref 5–25)
CALCIUM SERPL-MCNC: 8.4 MG/DL (ref 8.3–10.1)
CHLORIDE SERPL-SCNC: 105 MMOL/L (ref 96–108)
CO2 SERPL-SCNC: 24 MMOL/L (ref 21–32)
CREAT SERPL-MCNC: 1.15 MG/DL (ref 0.6–1.3)
EOSINOPHIL # BLD AUTO: 0.2 THOUSAND/ÂΜL (ref 0–0.61)
EOSINOPHIL NFR BLD AUTO: 4 % (ref 0–6)
ERYTHROCYTE [DISTWIDTH] IN BLOOD BY AUTOMATED COUNT: 14.9 % (ref 11.6–15.1)
GFR SERPL CREATININE-BSD FRML MDRD: 65 ML/MIN/1.73SQ M
GLUCOSE SERPL-MCNC: 97 MG/DL (ref 65–140)
HCT VFR BLD AUTO: 37.8 % (ref 36.5–49.3)
HGB BLD-MCNC: 13.2 G/DL (ref 12–17)
IMM GRANULOCYTES # BLD AUTO: 0.02 THOUSAND/UL (ref 0–0.2)
IMM GRANULOCYTES NFR BLD AUTO: 0 % (ref 0–2)
LYMPHOCYTES # BLD AUTO: 1.77 THOUSANDS/ÂΜL (ref 0.6–4.47)
LYMPHOCYTES NFR BLD AUTO: 38 % (ref 14–44)
MCH RBC QN AUTO: 33.2 PG (ref 26.8–34.3)
MCHC RBC AUTO-ENTMCNC: 34.9 G/DL (ref 31.4–37.4)
MCV RBC AUTO: 95 FL (ref 82–98)
MONOCYTES # BLD AUTO: 0.62 THOUSAND/ÂΜL (ref 0.17–1.22)
MONOCYTES NFR BLD AUTO: 13 % (ref 4–12)
NEUTROPHILS # BLD AUTO: 2.08 THOUSANDS/ÂΜL (ref 1.85–7.62)
NEUTS SEG NFR BLD AUTO: 45 % (ref 43–75)
NRBC BLD AUTO-RTO: 0 /100 WBCS
PLATELET # BLD AUTO: 206 THOUSANDS/UL (ref 149–390)
PMV BLD AUTO: 9.9 FL (ref 8.9–12.7)
POTASSIUM SERPL-SCNC: 3.9 MMOL/L (ref 3.5–5.3)
RBC # BLD AUTO: 3.97 MILLION/UL (ref 3.88–5.62)
SODIUM SERPL-SCNC: 137 MMOL/L (ref 135–147)
WBC # BLD AUTO: 4.7 THOUSAND/UL (ref 4.31–10.16)

## 2022-10-26 PROCEDURE — 80048 BASIC METABOLIC PNL TOTAL CA: CPT | Performed by: PHYSICIAN ASSISTANT

## 2022-10-26 PROCEDURE — 99239 HOSP IP/OBS DSCHRG MGMT >30: CPT | Performed by: PHYSICIAN ASSISTANT

## 2022-10-26 PROCEDURE — 85025 COMPLETE CBC W/AUTO DIFF WBC: CPT | Performed by: PHYSICIAN ASSISTANT

## 2022-10-26 RX ORDER — LISINOPRIL 5 MG/1
5 TABLET ORAL DAILY
Qty: 30 TABLET | Refills: 0 | Status: SHIPPED | OUTPATIENT
Start: 2022-10-27

## 2022-10-26 RX ORDER — LISINOPRIL 5 MG/1
5 TABLET ORAL DAILY
Status: DISCONTINUED | OUTPATIENT
Start: 2022-10-26 | End: 2022-10-26 | Stop reason: HOSPADM

## 2022-10-26 RX ADMIN — MULTIPLE VITAMINS W/ MINERALS TAB 1 TABLET: TAB ORAL at 08:01

## 2022-10-26 RX ADMIN — FOLIC ACID 1 MG: 1 TABLET ORAL at 08:01

## 2022-10-26 RX ADMIN — LEVETIRACETAM 500 MG: 250 TABLET, FILM COATED ORAL at 08:01

## 2022-10-26 RX ADMIN — LISINOPRIL 5 MG: 5 TABLET ORAL at 08:17

## 2022-10-26 RX ADMIN — PANTOPRAZOLE SODIUM 40 MG: 40 TABLET, DELAYED RELEASE ORAL at 05:32

## 2022-10-26 RX ADMIN — FLUOXETINE HYDROCHLORIDE 20 MG: 20 CAPSULE ORAL at 08:01

## 2022-10-26 RX ADMIN — HEPARIN SODIUM 5000 UNITS: 5000 INJECTION INTRAVENOUS; SUBCUTANEOUS at 05:33

## 2022-10-26 RX ADMIN — AMLODIPINE BESYLATE 10 MG: 5 TABLET ORAL at 08:01

## 2022-10-26 RX ADMIN — Medication 100 MG: at 08:01

## 2022-10-26 NOTE — PLAN OF CARE
Problem: Potential for Falls  Goal: Patient will remain free of falls  Description: INTERVENTIONS:  - Educate patient/family on patient safety including physical limitations  - Instruct patient to call for assistance with activity   - Consult OT/PT to assist with strengthening/mobility   - Keep Call bell within reach  - Keep bed low and locked with side rails adjusted as appropriate  - Keep care items and personal belongings within reach  - Initiate and maintain comfort rounds  - Make Fall Risk Sign visible to staff  - Offer Toileting every  Hours, in advance of need  - Initiate/Maintain alarm  - Obtain necessary fall risk management equipment:   - Apply yellow socks and bracelet for high fall risk patients  - Consider moving patient to room near nurses station  Outcome: Progressing     Problem: MOBILITY - ADULT  Goal: Maintain or return to baseline ADL function  Description: INTERVENTIONS:  -  Assess patient's ability to carry out ADLs; assess patient's baseline for ADL function and identify physical deficits which impact ability to perform ADLs (bathing, care of mouth/teeth, toileting, grooming, dressing, etc )  - Assess/evaluate cause of self-care deficits   - Assess range of motion  - Assess patient's mobility; develop plan if impaired  - Assess patient's need for assistive devices and provide as appropriate  - Encourage maximum independence but intervene and supervise when necessary  - Involve family in performance of ADLs  - Assess for home care needs following discharge   - Consider OT consult to assist with ADL evaluation and planning for discharge  - Provide patient education as appropriate  Outcome: Progressing  Goal: Maintains/Returns to pre admission functional level  Description: INTERVENTIONS:  - Perform BMAT or MOVE assessment daily    - Set and communicate daily mobility goal to care team and patient/family/caregiver     - Collaborate with rehabilitation services on mobility goals if consulted  - Perform Range of Motion  times a day  - Reposition patient every  hours    - Dangle patient  times a day  - Stand patient  times a day  - Ambulate patient  times a day  - Out of bed to chair  times a day   - Out of bed for meals times a day  - Out of bed for toileting  - Record patient progress and toleration of activity level   Outcome: Progressing     Problem: GASTROINTESTINAL - ADULT  Goal: Minimal or absence of nausea and/or vomiting  Description: INTERVENTIONS:  - Administer IV fluids if ordered to ensure adequate hydration  - Maintain NPO status until nausea and vomiting are resolved  - Nasogastric tube if ordered  - Administer ordered antiemetic medications as needed  - Provide nonpharmacologic comfort measures as appropriate  - Advance diet as tolerated, if ordered  - Consider nutrition services referral to assist patient with adequate nutrition and appropriate food choices  Outcome: Progressing  Goal: Maintains or returns to baseline bowel function  Description: INTERVENTIONS:  - Assess bowel function  - Encourage oral fluids to ensure adequate hydration  - Administer IV fluids if ordered to ensure adequate hydration  - Administer ordered medications as needed  - Encourage mobilization and activity  - Consider nutritional services referral to assist patient with adequate nutrition and appropriate food choices  Outcome: Progressing  Goal: Maintains adequate nutritional intake  Description: INTERVENTIONS:  - Monitor percentage of each meal consumed  - Identify factors contributing to decreased intake, treat as appropriate  - Assist with meals as needed  - Monitor I&O, weight, and lab values if indicated  - Obtain nutrition services referral as needed  Outcome: Progressing  Goal: Establish and maintain optimal ostomy function  Description: INTERVENTIONS:  - Assess bowel function  - Encourage oral fluids to ensure adequate hydration  - Administer IV fluids if ordered to ensure adequate hydration   - Administer ordered medications as needed  - Encourage mobilization and activity  - Nutrition services referral to assist patient with appropriate food choices  - Assess stoma site  - Consider wound care consult   Outcome: Progressing  Goal: Oral mucous membranes remain intact  Description: INTERVENTIONS  - Assess oral mucosa and hygiene practices  - Implement preventative oral hygiene regimen  - Implement oral medicated treatments as ordered  - Initiate Nutrition services referral as needed  Outcome: Progressing     Problem: METABOLIC, FLUID AND ELECTROLYTES - ADULT  Goal: Electrolytes maintained within normal limits  Description: INTERVENTIONS:  - Monitor labs and assess patient for signs and symptoms of electrolyte imbalances  - Administer electrolyte replacement as ordered  - Monitor response to electrolyte replacements, including repeat lab results as appropriate  - Instruct patient on fluid and nutrition as appropriate  Outcome: Progressing  Goal: Fluid balance maintained  Description: INTERVENTIONS:  - Monitor labs   - Monitor I/O and WT  - Instruct patient on fluid and nutrition as appropriate  - Assess for signs & symptoms of volume excess or deficit  Outcome: Progressing  Goal: Glucose maintained within target range  Description: INTERVENTIONS:  - Monitor Blood Glucose as ordered  - Assess for signs and symptoms of hyperglycemia and hypoglycemia  - Administer ordered medications to maintain glucose within target range  - Assess nutritional intake and initiate nutrition service referral as needed  Outcome: Progressing     Problem: MUSCULOSKELETAL - ADULT  Goal: Maintain or return mobility to safest level of function  Description: INTERVENTIONS:  - Assess patient's ability to carry out ADLs; assess patient's baseline for ADL function and identify physical deficits which impact ability to perform ADLs (bathing, care of mouth/teeth, toileting, grooming, dressing, etc )  - Assess/evaluate cause of self-care deficits   - Assess range of motion  - Assess patient's mobility  - Assess patient's need for assistive devices and provide as appropriate  - Encourage maximum independence but intervene and supervise when necessary  - Involve family in performance of ADLs  - Assess for home care needs following discharge   - Consider OT consult to assist with ADL evaluation and planning for discharge  - Provide patient education as appropriate  Outcome: Progressing  Goal: Maintain proper alignment of affected body part  Description: INTERVENTIONS:  - Support, maintain and protect limb and body alignment  - Provide patient/ family with appropriate education  Outcome: Progressing

## 2022-10-26 NOTE — CASE MANAGEMENT
Case Management Progress Note    Patient name Lesa Rebollar  Location [facility] [address] [id number] MRN [id number]  : 1955 Date 10/26/2022       LOS (days): 1  Geometric Mean LOS (GMLOS) (days): 2 60  Days to GMLOS:2 1        OBJECTIVE:        Current admission status: Inpatient  Preferred Pharmacy:   [id number] [address] [address]  Phone: [phone] Fax: [phone]    Primary Care Provider: Madina Brandt MD    Primary Insurance: MEDICARE  Secondary Insurance: [facility]    PROGRESS NOTE:    LYFT ride scheduled for an 11am  time to pt's home today  Pt's bedside RN made aware of same

## 2022-10-26 NOTE — PLAN OF CARE
Problem: Potential for Falls  Goal: Patient will remain free of falls  Description: INTERVENTIONS:  - Educate patient/family on patient safety including physical limitations  - Instruct patient to call for assistance with activity   - Consult OT/PT to assist with strengthening/mobility   - Keep Call bell within reach  - Keep bed low and locked with side rails adjusted as appropriate  - Keep care items and personal belongings within reach  - Initiate and maintain comfort rounds  - Make Fall Risk Sign visible to staff  - Offer Toileting every 2  Problem: METABOLIC, FLUID AND ELECTROLYTES - ADULT  Goal: Electrolytes maintained within normal limits  Description: INTERVENTIONS:  - Monitor labs and assess patient for signs and symptoms of electrolyte imbalances  - Administer electrolyte replacement as ordered  - Monitor response to electrolyte replacements, including repeat lab results as appropriate  - Instruct patient on fluid and nutrition as appropriate  Outcome: Progressing  Goal: Fluid balance maintained  Description: INTERVENTIONS:  - Monitor labs   - Monitor I/O and WT  - Instruct patient on fluid and nutrition as appropriate  - Assess for signs & symptoms of volume excess or deficit  Outcome: Progressing  Goal: Glucose maintained within target range  Description: INTERVENTIONS:  - Monitor Blood Glucose as ordered  - Assess for signs and symptoms of hyperglycemia and hypoglycemia  - Administer ordered medications to maintain glucose within target range  - Assess nutritional intake and initiate nutrition service referral as needed  Outcome: Progressing     Problem: Prexisting or High Potential for Compromised Skin Integrity  Goal: Skin integrity is maintained or improved  Description: INTERVENTIONS:  - Identify patients at risk for skin breakdown  - Assess and monitor skin integrity  - Assess and monitor nutrition and hydration status  - Monitor labs   - Assess for incontinence   - Turn and reposition patient  - Assist with mobility/ambulation  - Relieve pressure over bony prominences  - Avoid friction and shearing  - Provide appropriate hygiene as needed including keeping skin clean and dry  - Evaluate need for skin moisturizer/barrier cream  - Collaborate with interdisciplinary team   - Patient/family teaching  - Consider wound care consult   Outcome: Progressing    Hours, in advance of need  - Initiate/Maintain alarm  - Obtain necessary fall risk management equipment:   - Apply yellow socks and bracelet for high fall risk patients  - Consider moving patient to room near nurses station  Outcome: Progressing

## 2022-10-27 LAB — LEVETIRACETAM SERPL-MCNC: 19.4 UG/ML (ref 10–40)

## 2022-10-31 ENCOUNTER — TELEPHONE (OUTPATIENT)
Dept: PSYCHIATRY | Facility: CLINIC | Age: 67
End: 2022-10-31

## 2022-10-31 NOTE — TELEPHONE ENCOUNTER
Waitl ist client  Left VM regarding scheduling for counseling services  Clt is also overdue for med check with Dr Michelle Mantel

## 2022-11-04 LAB
DME PARACHUTE DELIVERY DATE ACTUAL: NORMAL
DME PARACHUTE DELIVERY DATE REQUESTED: NORMAL
DME PARACHUTE ITEM DESCRIPTION: NORMAL
DME PARACHUTE ORDER STATUS: NORMAL
DME PARACHUTE SUPPLIER NAME: NORMAL
DME PARACHUTE SUPPLIER PHONE: NORMAL

## 2022-12-24 PROBLEM — S01.81XA LACERATION OF FACE: Status: RESOLVED | Noted: 2022-10-24 | Resolved: 2022-12-24

## 2023-08-27 ENCOUNTER — HOSPITAL ENCOUNTER (EMERGENCY)
Facility: HOSPITAL | Age: 68
Discharge: HOME | End: 2023-08-27
Attending: EMERGENCY MEDICINE
Payer: COMMERCIAL

## 2023-08-27 VITALS
TEMPERATURE: 97.7 F | SYSTOLIC BLOOD PRESSURE: 141 MMHG | BODY MASS INDEX: 29.7 KG/M2 | HEART RATE: 85 BPM | RESPIRATION RATE: 15 BRPM | DIASTOLIC BLOOD PRESSURE: 73 MMHG | WEIGHT: 196 LBS | HEIGHT: 68 IN | OXYGEN SATURATION: 94 %

## 2023-08-27 DIAGNOSIS — K62.5 RECTAL BLEEDING: ICD-10-CM

## 2023-08-27 DIAGNOSIS — F10.929 ALCOHOL INTOXICATION (HCC): Primary | ICD-10-CM

## 2023-08-27 LAB
ALBUMIN SERPL BCP-MCNC: 4.5 G/DL (ref 3.5–5)
ALP SERPL-CCNC: 54 U/L (ref 34–104)
ALT SERPL W P-5'-P-CCNC: 50 U/L (ref 7–52)
AMPHETAMINES SERPL QL SCN: NEGATIVE
ANION GAP SERPL CALCULATED.3IONS-SCNC: 8 MMOL/L
AST SERPL W P-5'-P-CCNC: 34 U/L (ref 13–39)
BARBITURATES UR QL: NEGATIVE
BASOPHILS # BLD AUTO: 0.04 THOUSANDS/ÂΜL (ref 0–0.1)
BASOPHILS NFR BLD AUTO: 1 % (ref 0–1)
BENZODIAZ UR QL: NEGATIVE
BILIRUB SERPL-MCNC: 0.86 MG/DL (ref 0.2–1)
BILIRUB UR QL STRIP: NEGATIVE
BUN SERPL-MCNC: 12 MG/DL (ref 5–25)
CALCIUM SERPL-MCNC: 10.2 MG/DL (ref 8.4–10.2)
CHLORIDE SERPL-SCNC: 104 MMOL/L (ref 96–108)
CLARITY UR: CLEAR
CO2 SERPL-SCNC: 22 MMOL/L (ref 21–32)
COCAINE UR QL: NEGATIVE
COLOR UR: ABNORMAL
CREAT SERPL-MCNC: 1.03 MG/DL (ref 0.6–1.3)
EOSINOPHIL # BLD AUTO: 0.43 THOUSAND/ÂΜL (ref 0–0.61)
EOSINOPHIL NFR BLD AUTO: 5 % (ref 0–6)
ERYTHROCYTE [DISTWIDTH] IN BLOOD BY AUTOMATED COUNT: 13.7 % (ref 11.6–15.1)
ETHANOL EXG-MCNC: 0.08 MG/DL
ETHANOL EXG-MCNC: 0.09 MG/DL
ETHANOL EXG-MCNC: 0.21 MG/DL
GFR SERPL CREATININE-BSD FRML MDRD: 75 ML/MIN/1.73SQ M
GLUCOSE SERPL-MCNC: 103 MG/DL (ref 65–140)
GLUCOSE UR STRIP-MCNC: NEGATIVE MG/DL
HCT VFR BLD AUTO: 36.2 % (ref 36.5–49.3)
HGB BLD-MCNC: 12.5 G/DL (ref 12–17)
HGB UR QL STRIP.AUTO: NEGATIVE
IMM GRANULOCYTES # BLD AUTO: 0.02 THOUSAND/UL (ref 0–0.2)
IMM GRANULOCYTES NFR BLD AUTO: 0 % (ref 0–2)
KETONES UR STRIP-MCNC: NEGATIVE MG/DL
LEUKOCYTE ESTERASE UR QL STRIP: NEGATIVE
LYMPHOCYTES # BLD AUTO: 3.45 THOUSANDS/ÂΜL (ref 0.6–4.47)
LYMPHOCYTES NFR BLD AUTO: 43 % (ref 14–44)
MCH RBC QN AUTO: 32.3 PG (ref 26.8–34.3)
MCHC RBC AUTO-ENTMCNC: 34.5 G/DL (ref 31.4–37.4)
MCV RBC AUTO: 94 FL (ref 82–98)
MONOCYTES # BLD AUTO: 1.01 THOUSAND/ÂΜL (ref 0.17–1.22)
MONOCYTES NFR BLD AUTO: 13 % (ref 4–12)
NEUTROPHILS # BLD AUTO: 3.01 THOUSANDS/ÂΜL (ref 1.85–7.62)
NEUTS SEG NFR BLD AUTO: 38 % (ref 43–75)
NITRITE UR QL STRIP: NEGATIVE
NRBC BLD AUTO-RTO: 0 /100 WBCS
OPIATES UR QL SCN: NEGATIVE
OXYCODONE+OXYMORPHONE UR QL SCN: NEGATIVE
PCP UR QL: NEGATIVE
PH UR STRIP.AUTO: 5.5 [PH]
PLATELET # BLD AUTO: 222 THOUSANDS/UL (ref 149–390)
PMV BLD AUTO: 10 FL (ref 8.9–12.7)
POTASSIUM SERPL-SCNC: 3.5 MMOL/L (ref 3.5–5.3)
PROT SERPL-MCNC: 7.1 G/DL (ref 6.4–8.4)
PROT UR STRIP-MCNC: NEGATIVE MG/DL
RBC # BLD AUTO: 3.87 MILLION/UL (ref 3.88–5.62)
SODIUM SERPL-SCNC: 134 MMOL/L (ref 135–147)
SP GR UR STRIP.AUTO: <1.005 (ref 1–1.03)
THC UR QL: NEGATIVE
TSH SERPL DL<=0.05 MIU/L-ACNC: 1.55 UIU/ML (ref 0.45–4.5)
UROBILINOGEN UR STRIP-ACNC: <2 MG/DL
WBC # BLD AUTO: 7.96 THOUSAND/UL (ref 4.31–10.16)

## 2023-08-27 PROCEDURE — 80307 DRUG TEST PRSMV CHEM ANLYZR: CPT | Performed by: EMERGENCY MEDICINE

## 2023-08-27 PROCEDURE — 36415 COLL VENOUS BLD VENIPUNCTURE: CPT | Performed by: EMERGENCY MEDICINE

## 2023-08-27 PROCEDURE — 93005 ELECTROCARDIOGRAM TRACING: CPT

## 2023-08-27 PROCEDURE — 99284 EMERGENCY DEPT VISIT MOD MDM: CPT | Performed by: EMERGENCY MEDICINE

## 2023-08-27 PROCEDURE — 84443 ASSAY THYROID STIM HORMONE: CPT | Performed by: EMERGENCY MEDICINE

## 2023-08-27 PROCEDURE — 80053 COMPREHEN METABOLIC PANEL: CPT | Performed by: EMERGENCY MEDICINE

## 2023-08-27 PROCEDURE — 81003 URINALYSIS AUTO W/O SCOPE: CPT | Performed by: EMERGENCY MEDICINE

## 2023-08-27 PROCEDURE — 85025 COMPLETE CBC W/AUTO DIFF WBC: CPT | Performed by: EMERGENCY MEDICINE

## 2023-08-27 PROCEDURE — 82075 ASSAY OF BREATH ETHANOL: CPT | Performed by: EMERGENCY MEDICINE

## 2023-08-27 PROCEDURE — 99283 EMERGENCY DEPT VISIT LOW MDM: CPT

## 2023-08-27 RX ORDER — LANOLIN ALCOHOL/MO/W.PET/CERES
100 CREAM (GRAM) TOPICAL DAILY
Status: DISCONTINUED | OUTPATIENT
Start: 2023-08-27 | End: 2023-08-27 | Stop reason: HOSPADM

## 2023-08-27 RX ORDER — DOCUSATE SODIUM 100 MG/1
100 CAPSULE, LIQUID FILLED ORAL 2 TIMES DAILY
Status: DISCONTINUED | OUTPATIENT
Start: 2023-08-27 | End: 2023-08-27 | Stop reason: HOSPADM

## 2023-08-27 RX ORDER — PANTOPRAZOLE SODIUM 40 MG/1
40 TABLET, DELAYED RELEASE ORAL
Status: DISCONTINUED | OUTPATIENT
Start: 2023-08-27 | End: 2023-08-27 | Stop reason: HOSPADM

## 2023-08-27 RX ORDER — FLUOXETINE 10 MG/1
20 CAPSULE ORAL DAILY
Status: DISCONTINUED | OUTPATIENT
Start: 2023-08-27 | End: 2023-08-27 | Stop reason: HOSPADM

## 2023-08-27 RX ORDER — AMLODIPINE BESYLATE 5 MG/1
5 TABLET ORAL DAILY
Status: DISCONTINUED | OUTPATIENT
Start: 2023-08-27 | End: 2023-08-27 | Stop reason: HOSPADM

## 2023-08-27 RX ORDER — FOLIC ACID 1 MG/1
1 TABLET ORAL DAILY
Status: DISCONTINUED | OUTPATIENT
Start: 2023-08-27 | End: 2023-08-27 | Stop reason: HOSPADM

## 2023-08-27 RX ORDER — LISINOPRIL 5 MG/1
5 TABLET ORAL DAILY
Status: DISCONTINUED | OUTPATIENT
Start: 2023-08-27 | End: 2023-08-27 | Stop reason: HOSPADM

## 2023-08-27 RX ORDER — LEVETIRACETAM 250 MG/1
500 TABLET ORAL 2 TIMES DAILY
Status: DISCONTINUED | OUTPATIENT
Start: 2023-08-27 | End: 2023-08-27 | Stop reason: HOSPADM

## 2023-08-27 RX ADMIN — LISINOPRIL 5 MG: 5 TABLET ORAL at 09:10

## 2023-08-27 RX ADMIN — Medication 100 MG: at 09:09

## 2023-08-27 RX ADMIN — LEVETIRACETAM 500 MG: 250 TABLET, FILM COATED ORAL at 09:08

## 2023-08-27 RX ADMIN — FLUOXETINE 20 MG: 10 CAPSULE ORAL at 09:09

## 2023-08-27 RX ADMIN — MULTIPLE VITAMINS W/ MINERALS TAB 1 TABLET: TAB ORAL at 09:09

## 2023-08-27 RX ADMIN — FOLIC ACID 1 MG: 1 TABLET ORAL at 09:10

## 2023-08-27 RX ADMIN — AMLODIPINE BESYLATE 5 MG: 5 TABLET ORAL at 09:10

## 2023-08-27 RX ADMIN — PANTOPRAZOLE SODIUM 40 MG: 40 TABLET, DELAYED RELEASE ORAL at 05:19

## 2023-08-27 RX ADMIN — DOCUSATE SODIUM 100 MG: 100 CAPSULE, LIQUID FILLED ORAL at 09:09

## 2023-08-27 NOTE — ED CARE HANDOFF
[facility] Warm Handoff Outcome Note    Patient name Brian Linda  Location ED 04/ED 04 MRN [id number]  Age: 78 y.o. Plan Type: Warm Handoff                                                                                    Plan Date: 8/27/2023  Service:  ED Warm Handoff      Substance Use History:  Alcohol    Warm Handoff Update:  [facility] completed assessment, cl given options for a 4.0 LOC but declined entering treatment today.     Warm Handoff Outcome: Treatment Related Resources

## 2023-08-27 NOTE — ED PROVIDER NOTES
History  Chief Complaint   Patient presents with   • Alcohol Intoxication     EMS states that pt is from home and is a chronic alcoholic. EMS states that pt has been drinking since noon yesterday afternoon. Pt states that he would like to go to rehab. Pt denies withdrawal seizures. Pt states that he also has hemorrhoids. Pt states that he has been scratching his butt due to it itching      This is a 61-year-old male who presents via ambulance for evaluation of alcohol abuse in addition to chronic recurrent rectal bleeding. Patient had colonoscopy and June 2022 for similar issue and was found to have internal hemorrhoids no other acute findings on colonoscopy. Denies any suicidal intentions states that he has been to rehab in the past for alcohol abuse. Denies any history of withdrawal seizures      History provided by:  Patient  Medical Problem  Location:  Generalized  Quality:  Alcohol intoxication and intermittent chronic recurrent rectal bleeding  Severity:  Unable to specify  Timing:  Intermittent  Progression:  Waxing and waning  Chronicity:  Recurrent  Context:  Alcohol intoxication and intermittent chronic recurrent rectal bleeding  Associated symptoms: no abdominal pain and no shortness of breath        Prior to Admission Medications   Prescriptions Last Dose Informant Patient Reported? Taking? FLUoxetine (PROzac) 20 mg capsule   Yes No   Sig: Take 20 mg by mouth every morning   amLODIPine (NORVASC) 10 mg tablet  Self Yes No   docusate sodium (COLACE) 100 mg capsule  Self No No   Sig: Take 1 capsule (100 mg total) by mouth every 12 (twelve) hours   levETIRAcetam (KEPPRA) 500 mg tablet   Yes No   Sig: Take 500 mg by mouth 2 (two) times a day   lisinopril (ZESTRIL) 5 mg tablet   No No   Sig: Take 1 tablet (5 mg total) by mouth daily Do not start before October 27, 2022.    pantoprazole (PROTONIX) 40 mg tablet  Self Yes No      Facility-Administered Medications: None       Past Medical History:   Diagnosis Date   • Alcohol abuse    • Anxiety    • Cancer ([address])     kidney   • Depression    • GERD (gastroesophageal reflux disease)    • Hypertension        Past Surgical History:   Procedure Laterality Date   • NEPHRECTOMY Left    • UMBILICAL HERNIA REPAIR         Family History   Problem Relation Age of Onset   • Colon cancer Neg Hx    • Colon polyps Neg Hx      I have reviewed and agree with the history as documented. E-Cigarette/Vaping   • E-Cigarette Use Never User      E-Cigarette/Vaping Substances   • Nicotine No    • THC No    • CBD No    • Flavoring No    • Other No    • Unknown No      Social History     Tobacco Use   • Smoking status: Never   • Smokeless tobacco: Never   Vaping Use   • Vaping Use: Never used   Substance Use Topics   • Alcohol use: Yes     Comment: a gallon of gin a week   • Drug use: Never       Review of Systems   HENT: Positive for hearing loss. Respiratory: Negative for shortness of breath. Gastrointestinal: Positive for anal bleeding. Negative for abdominal pain. Psychiatric/Behavioral: Negative for suicidal ideas. All other systems reviewed and are negative. Physical Exam  Physical Exam  Vitals and nursing note reviewed. Constitutional:       General: He is not in acute distress. Appearance: He is not ill-appearing, toxic-appearing or diaphoretic. HENT:      Head: Normocephalic and atraumatic. Right Ear: External ear normal.      Left Ear: External ear normal.   Eyes:      General: No scleral icterus. Right eye: No discharge. Left eye: No discharge. Extraocular Movements: Extraocular movements intact. Comments: Pupils 4 mm bilateral and sluggish   Cardiovascular:      Rate and Rhythm: Normal rate and regular rhythm. Pulses: Normal pulses. Heart sounds: No murmur heard. No friction rub. No gallop. Pulmonary:      Effort: Pulmonary effort is normal. No respiratory distress. Breath sounds: No stridor.  No wheezing, rhonchi or rales. Abdominal:      Palpations: Abdomen is soft. Tenderness: There is no abdominal tenderness. There is no guarding or rebound. Genitourinary:     Comments: Perianal skin excoriations and brown stool on examination no active hemorrhage or thrombosed hemorrhoid  Musculoskeletal:         General: Normal range of motion. Cervical back: Normal range of motion and neck supple. Right lower leg: Edema present. Left lower leg: Edema present. Skin:     General: Skin is warm and dry. Findings: No erythema or rash. Neurological:      General: No focal deficit present. Mental Status: He is alert and oriented to person, place, and time. Cranial Nerves: No cranial nerve deficit. Sensory: No sensory deficit. Motor: No weakness.    Psychiatric:      Comments: Flat affect denies any suicidal intentions         Vital Signs  ED Triage Vitals   Temperature Pulse Respirations Blood Pressure SpO2   08/27/23 0414 08/27/23 0414 08/27/23 0414 08/27/23 0414 08/27/23 0414   97.7 °F (36.5 °C) 83 18 168/90 95 %      Temp Source Heart Rate Source Patient Position - Orthostatic VS BP Location FiO2 (%)   08/27/23 0414 08/27/23 0414 08/27/23 0645 08/27/23 0645 --   Oral Monitor Lying Left arm       Pain Score       --                  Vitals:    08/27/23 0645 08/27/23 0910 08/27/23 1000 08/27/23 1100   BP: 118/71 165/90 141/71 141/73   Pulse: 79 92 82 85   Patient Position - Orthostatic VS: Lying            Visual Acuity      ED Medications  Medications - No data to display    Diagnostic Studies  Results Reviewed     Procedure Component Value Units Date/Time    POCT alcohol breath test [[id number]]  (Normal) Resulted: 08/27/23 1121    Lab Status: Final result Updated: 08/27/23 1121     EXTBreath Alcohol 0.079    POCT alcohol breath test [[id number]]  (Normal) Resulted: 08/27/23 1031    Lab Status: Final result Updated: 08/27/23 1031     EXTBreath Alcohol 0.092    Rapid drug screen, urine [[id number]] Collected: 08/27/23 0518    Lab Status: Final result Specimen: Urine, Clean Catch Updated: 08/27/23 0605     Amph/Meth UR Negative     Barbiturate Ur Negative     Benzodiazepine Urine Negative     Cocaine Urine Negative     Methadone Urine --     Opiate Urine Negative     PCP Ur Negative     THC Urine Negative     Oxycodone Urine Negative    Narrative:      FOR MEDICAL PURPOSES ONLY. IF CONFIRMATION NEEDED PLEASE CONTACT THE LAB WITHIN 5 DAYS.     Drug Screen Cutoff Levels:  AMPHETAMINE/METHAMPHETAMINES  1000 ng/mL  BARBITURATES     200 ng/mL  BENZODIAZEPINES     200 ng/mL  COCAINE      300 ng/mL  METHADONE      300 ng/mL  OPIATES      300 ng/mL  PHENCYCLIDINE     25 ng/mL  THC       50 ng/mL  OXYCODONE      100 ng/mL    UA w Reflex to Microscopic w Reflex to Culture [[id number]]  (Abnormal) Collected: 08/27/23 0518    Lab Status: Final result Specimen: Urine, Clean Catch Updated: 08/27/23 0536     Color, UA Light Yellow     Clarity, UA Clear     Specific Gravity, UA <1.005     pH, UA 5.5     Leukocytes, UA Negative     Nitrite, UA Negative     Protein, UA Negative mg/dl      Glucose, UA Negative mg/dl      Ketones, UA Negative mg/dl      Urobilinogen, UA <2.0 mg/dl      Bilirubin, UA Negative     Occult Blood, UA Negative    TSH, 3rd generation with Free T4 reflex [[id number]]  (Normal) Collected: 08/27/23 0430    Lab Status: Final result Specimen: Blood from Arm, Right Updated: 08/27/23 0508     TSH 3RD GENERATON 1.546 uIU/mL     Comprehensive metabolic panel [[id number]]  (Abnormal) Collected: 08/27/23 0430    Lab Status: Final result Specimen: Blood from Arm, Right Updated: 08/27/23 0455     Sodium 134 mmol/L      Potassium 3.5 mmol/L      Chloride 104 mmol/L      CO2 22 mmol/L      ANION GAP 8 mmol/L      BUN 12 mg/dL      Creatinine 1.03 mg/dL      Glucose 103 mg/dL      Calcium 10.2 mg/dL      AST 34 U/L      ALT 50 U/L      Alkaline Phosphatase 54 U/L      Total Protein 7.1 g/dL      Albumin 4.5 g/dL      Total Bilirubin 0.86 mg/dL      eGFR 75 ml/min/1.73sq m     Narrative:      National Kidney Disease Foundation guidelines for Chronic Kidney Disease (CKD):   •  Stage 1 with normal or high GFR (GFR > 90 mL/min/1.73 square meters)  •  Stage 2 Mild CKD (GFR = 60-89 mL/min/1.73 square meters)  •  Stage 3A Moderate CKD (GFR = 45-59 mL/min/1.73 square meters)  •  Stage 3B Moderate CKD (GFR = 30-44 mL/min/1.73 square meters)  •  Stage 4 Severe CKD (GFR = 15-29 mL/min/1.73 square meters)  •  Stage 5 End Stage CKD (GFR <15 mL/min/1.73 square meters)  Note: GFR calculation is accurate only with a steady state creatinine    CBC and differential [[id number]]  (Abnormal) Collected: 08/27/23 0430    Lab Status: Final result Specimen: Blood from Arm, Right Updated: 08/27/23 0436     WBC 7.96 Thousand/uL      RBC 3.87 Million/uL      Hemoglobin 12.5 g/dL      Hematocrit 36.2 %      MCV 94 fL      MCH 32.3 pg      MCHC 34.5 g/dL      RDW 13.7 %      MPV 10.0 fL      Platelets 712 Thousands/uL      nRBC 0 /100 WBCs      Neutrophils Relative 38 %      Immat GRANS % 0 %      Lymphocytes Relative 43 %      Monocytes Relative 13 %      Eosinophils Relative 5 %      Basophils Relative 1 %      Neutrophils Absolute 3.01 Thousands/µL      Immature Grans Absolute 0.02 Thousand/uL      Lymphocytes Absolute 3.45 Thousands/µL      Monocytes Absolute 1.01 Thousand/µL      Eosinophils Absolute 0.43 Thousand/µL      Basophils Absolute 0.04 Thousands/µL     POCT alcohol breath test [[id number]]  (Normal) Resulted: 08/27/23 0432    Lab Status: Final result Updated: 08/27/23 0432     EXTBreath Alcohol 0.211                 No orders to display              Procedures  ECG 12 Lead Documentation Only    Date/Time: 8/27/2023 4:26 AM    Performed by: Sánchez Cruz DO  Authorized by: Sánchez Cruz DO    ECG reviewed by me, the ED Provider: yes    Patient location:  ED  Rate:     ECG rate:  79  Rhythm:     Rhythm: sinus rhythm Conduction:     Conduction: normal    T waves:     T waves: non-specific               ED Course  ED Course as of 08/30/23 1109   Sun Aug 27, 2023   0456 Hemoglobin is stable patient can be considered medically cleared we will put in consult for warm handoff to zaina   0504 No answer at [facility] cares number provided                                             Medical Decision Making  History of rectal bleeding we will check hemoglobin patient's vital signs otherwise stable no acute hemorrhaging on examination also patient requesting detox from alcohol medical clearance in process we will check CBC CMP and CIWA protocol initiated    Amount and/or Complexity of Data Reviewed  Labs: ordered. Risk  OTC drugs. Disposition  Final diagnoses:   Alcohol intoxication ([address])   Rectal bleeding     Time reflects when diagnosis was documented in both MDM as applicable and the Disposition within this note     Time User Action Codes Description Comment    8/27/2023  4:32 AM Cipriano Malloy Add [F10.929] Alcohol intoxication ([address])     8/27/2023  4:32 AM Cipriano Malloy Add [K62.5] Rectal bleeding       ED Disposition     ED Disposition   Discharge    Condition   Stable    Date/Time   Sun Aug 27, 2023 11:50 AM    Comment   [address] discharge to home/self care.                Follow-up Information     Follow up With Specialties Details Why Eviekathymarisol  Call in 1 day  Attention [facility]  [address] [facility]  [phone]            Discharge Medication List as of 8/27/2023 11:50 AM      CONTINUE these medications which have NOT CHANGED    Details   amLODIPine (NORVASC) 10 mg tablet Historical Med      docusate sodium (COLACE) 100 mg capsule Take 1 capsule (100 mg total) by mouth every 12 (twelve) hours, Starting Fri 10/29/2021, Normal      FLUoxetine (PROzac) 20 mg capsule Take 20 mg by mouth every morning, Starting Wed 6/15/2022, Historical Med      levETIRAcetam (KEPPRA) 500 mg tablet Take 500 mg by mouth 2 (two) times a day, Starting Mon 7/11/2022, Historical Med      lisinopril (ZESTRIL) 5 mg tablet Take 1 tablet (5 mg total) by mouth daily Do not start before October 27, 2022., Starting Thu 10/27/2022, Normal      pantoprazole (PROTONIX) 40 mg tablet Historical Med             No discharge procedures on file.     PDMP Review       Value Time User    PDMP Reviewed  Yes 10/25/2022  4:29 PM Power Carlson PA-C          ED Provider  Electronically Signed by           Anjelica Keith DO  08/30/23 3651

## 2023-08-28 LAB
ATRIAL RATE: 79 BPM
P AXIS: 47 DEGREES
PR INTERVAL: 180 MS
QRS AXIS: -28 DEGREES
QRSD INTERVAL: 72 MS
QT INTERVAL: 378 MS
QTC INTERVAL: 433 MS
T WAVE AXIS: 49 DEGREES
VENTRICULAR RATE: 79 BPM

## 2023-08-28 PROCEDURE — 93010 ELECTROCARDIOGRAM REPORT: CPT | Performed by: INTERNAL MEDICINE

## 2023-11-24 ENCOUNTER — HOSPITAL ENCOUNTER (EMERGENCY)
Facility: HOSPITAL | Age: 68
Discharge: HOME | End: 2023-11-25
Attending: EMERGENCY MEDICINE
Payer: COMMERCIAL

## 2023-11-24 DIAGNOSIS — F10.10 ALCOHOL ABUSE: Primary | ICD-10-CM

## 2023-11-24 DIAGNOSIS — F32.A DEPRESSION: ICD-10-CM

## 2023-11-24 LAB
ALBUMIN SERPL BCP-MCNC: 4.5 G/DL (ref 3.5–5)
ALP SERPL-CCNC: 59 U/L (ref 34–104)
ALT SERPL W P-5'-P-CCNC: 54 U/L (ref 7–52)
AMPHETAMINES SERPL QL SCN: NEGATIVE
ANION GAP SERPL CALCULATED.3IONS-SCNC: 12 MMOL/L
AST SERPL W P-5'-P-CCNC: 47 U/L (ref 13–39)
BACTERIA UR QL AUTO: NORMAL /HPF
BARBITURATES UR QL: NEGATIVE
BASOPHILS # BLD AUTO: 0.04 THOUSANDS/ÂΜL (ref 0–0.1)
BASOPHILS NFR BLD AUTO: 1 % (ref 0–1)
BENZODIAZ UR QL: NEGATIVE
BILIRUB SERPL-MCNC: 0.78 MG/DL (ref 0.2–1)
BILIRUB UR QL STRIP: NEGATIVE
BUN SERPL-MCNC: 14 MG/DL (ref 5–25)
CALCIUM SERPL-MCNC: 9.5 MG/DL (ref 8.4–10.2)
CHLORIDE SERPL-SCNC: 105 MMOL/L (ref 96–108)
CLARITY UR: CLEAR
CO2 SERPL-SCNC: 21 MMOL/L (ref 21–32)
COCAINE UR QL: NEGATIVE
COLOR UR: YELLOW
CREAT SERPL-MCNC: 1.23 MG/DL (ref 0.6–1.3)
EOSINOPHIL # BLD AUTO: 0.38 THOUSAND/ÂΜL (ref 0–0.61)
EOSINOPHIL NFR BLD AUTO: 6 % (ref 0–6)
ERYTHROCYTE [DISTWIDTH] IN BLOOD BY AUTOMATED COUNT: 13.4 % (ref 11.6–15.1)
ETHANOL SERPL-MCNC: 278 MG/DL
GFR SERPL CREATININE-BSD FRML MDRD: 60 ML/MIN/1.73SQ M
GLUCOSE SERPL-MCNC: 108 MG/DL (ref 65–140)
GLUCOSE UR STRIP-MCNC: NEGATIVE MG/DL
HCT VFR BLD AUTO: 38 % (ref 36.5–49.3)
HGB BLD-MCNC: 12.6 G/DL (ref 12–17)
HGB UR QL STRIP.AUTO: NEGATIVE
IMM GRANULOCYTES # BLD AUTO: 0.02 THOUSAND/UL (ref 0–0.2)
IMM GRANULOCYTES NFR BLD AUTO: 0 % (ref 0–2)
KETONES UR STRIP-MCNC: NEGATIVE MG/DL
LEUKOCYTE ESTERASE UR QL STRIP: NEGATIVE
LYMPHOCYTES # BLD AUTO: 1.88 THOUSANDS/ÂΜL (ref 0.6–4.47)
LYMPHOCYTES NFR BLD AUTO: 32 % (ref 14–44)
MCH RBC QN AUTO: 31.7 PG (ref 26.8–34.3)
MCHC RBC AUTO-ENTMCNC: 33.2 G/DL (ref 31.4–37.4)
MCV RBC AUTO: 96 FL (ref 82–98)
MONOCYTES # BLD AUTO: 0.53 THOUSAND/ÂΜL (ref 0.17–1.22)
MONOCYTES NFR BLD AUTO: 9 % (ref 4–12)
MUCOUS THREADS UR QL AUTO: NORMAL
NEUTROPHILS # BLD AUTO: 3.09 THOUSANDS/ÂΜL (ref 1.85–7.62)
NEUTS SEG NFR BLD AUTO: 52 % (ref 43–75)
NITRITE UR QL STRIP: NEGATIVE
NON-SQ EPI CELLS URNS QL MICRO: NORMAL /HPF
NRBC BLD AUTO-RTO: 0 /100 WBCS
OPIATES UR QL SCN: NEGATIVE
OXYCODONE+OXYMORPHONE UR QL SCN: NEGATIVE
PCP UR QL: NEGATIVE
PH UR STRIP.AUTO: 5 [PH]
PLATELET # BLD AUTO: 240 THOUSANDS/UL (ref 149–390)
PMV BLD AUTO: 9.9 FL (ref 8.9–12.7)
POTASSIUM SERPL-SCNC: 4.1 MMOL/L (ref 3.5–5.3)
PROT SERPL-MCNC: 7.9 G/DL (ref 6.4–8.4)
PROT UR STRIP-MCNC: ABNORMAL MG/DL
RBC # BLD AUTO: 3.97 MILLION/UL (ref 3.88–5.62)
RBC #/AREA URNS AUTO: NORMAL /HPF
SODIUM SERPL-SCNC: 138 MMOL/L (ref 135–147)
SP GR UR STRIP.AUTO: 1.02 (ref 1–1.03)
THC UR QL: NEGATIVE
TSH SERPL DL<=0.05 MIU/L-ACNC: 1.51 UIU/ML (ref 0.45–4.5)
UROBILINOGEN UR STRIP-ACNC: <2 MG/DL
WBC # BLD AUTO: 5.94 THOUSAND/UL (ref 4.31–10.16)
WBC #/AREA URNS AUTO: NORMAL /HPF

## 2023-11-24 PROCEDURE — 81001 URINALYSIS AUTO W/SCOPE: CPT | Performed by: EMERGENCY MEDICINE

## 2023-11-24 PROCEDURE — 84443 ASSAY THYROID STIM HORMONE: CPT | Performed by: EMERGENCY MEDICINE

## 2023-11-24 PROCEDURE — 99284 EMERGENCY DEPT VISIT MOD MDM: CPT

## 2023-11-24 PROCEDURE — 85025 COMPLETE CBC W/AUTO DIFF WBC: CPT | Performed by: EMERGENCY MEDICINE

## 2023-11-24 PROCEDURE — 83735 ASSAY OF MAGNESIUM: CPT | Performed by: EMERGENCY MEDICINE

## 2023-11-24 PROCEDURE — 36415 COLL VENOUS BLD VENIPUNCTURE: CPT | Performed by: EMERGENCY MEDICINE

## 2023-11-24 PROCEDURE — 82077 ASSAY SPEC XCP UR&BREATH IA: CPT | Performed by: EMERGENCY MEDICINE

## 2023-11-24 PROCEDURE — 96360 HYDRATION IV INFUSION INIT: CPT

## 2023-11-24 PROCEDURE — 80053 COMPREHEN METABOLIC PANEL: CPT | Performed by: EMERGENCY MEDICINE

## 2023-11-24 PROCEDURE — 99285 EMERGENCY DEPT VISIT HI MDM: CPT | Performed by: EMERGENCY MEDICINE

## 2023-11-24 PROCEDURE — 80307 DRUG TEST PRSMV CHEM ANLYZR: CPT | Performed by: EMERGENCY MEDICINE

## 2023-11-24 RX ADMIN — SODIUM CHLORIDE 1000 ML: 0.9 INJECTION, SOLUTION INTRAVENOUS at 22:01

## 2023-11-25 VITALS
SYSTOLIC BLOOD PRESSURE: 128 MMHG | TEMPERATURE: 97.5 F | RESPIRATION RATE: 18 BRPM | OXYGEN SATURATION: 94 % | HEART RATE: 92 BPM | DIASTOLIC BLOOD PRESSURE: 78 MMHG

## 2023-11-25 LAB
ETHANOL EXG-MCNC: 0.11 MG/DL
ETHANOL EXG-MCNC: 0.15 MG/DL
MAGNESIUM SERPL-MCNC: 1.9 MG/DL (ref 1.9–2.7)

## 2023-11-25 PROCEDURE — 82075 ASSAY OF BREATH ETHANOL: CPT | Performed by: EMERGENCY MEDICINE

## 2023-11-25 RX ORDER — LANOLIN ALCOHOL/MO/W.PET/CERES
100 CREAM (GRAM) TOPICAL DAILY
Status: DISCONTINUED | OUTPATIENT
Start: 2023-11-25 | End: 2023-11-25 | Stop reason: HOSPADM

## 2023-11-25 RX ORDER — FOLIC ACID 1 MG/1
1 TABLET ORAL DAILY
Status: DISCONTINUED | OUTPATIENT
Start: 2023-11-25 | End: 2023-11-25 | Stop reason: HOSPADM

## 2023-11-25 NOTE — ED NOTES
This writer discussed the patients current presentation and recommended discharge plan with Dr. Aditi Mcduffie. They agree with the patient being discharged at this time with [facility]. Patient declined wanting a referral to [facility]    The patient was Instructed to follow up with their PCP  The patient was provided with referral information for:   [facility] D&A: [phone]  [facility] Crisis: [phone]    This writer and the patient completed a safety plan. The patient was provided with a copy of their safety plan with encouragement to utilize the plan following discharge. In addition, the patient was instructed to call local [facility] crisis, other crisis services, [facility] or to go to the nearest ER immediately if their situation changes at any time. This writer discussed discharge plans with the patient, who agrees with and understands the discharge plans. SAFETY PLAN  Warning Signs (thoughts, images, mood, behavior, situations) of a potential crisis: feelings of being lonely, dental pain, gossip, increased drinking      Coping Skills (what can I do to take my mind off the problem, or to keep myself safe): reading, watching TV, talking with friends      Outside Support (who can I reach out to for support and help): Friends, Mobile Crisis, [facility] Crisis.          National Suicide Prevention Hotline:  [address] [place] [address] [phone] - LVF Crisis/Mobile Crisis   [phone] - SLPF Crisis   [place]: [phone]  [facility] [place]: [address] [phone] - Crisis   [phone] - Peer Support Talk Line (1-9pm daily)  [phone] - Teen Support Talk Line (1-9pm daily)  [phone] [address] [phone] - Crisis [facility] - [address] [place] [address]

## 2023-11-25 NOTE — ED NOTES
BAT at request of crisis redone with result of .032      [facility]  11/25/23 2798

## 2023-11-25 NOTE — DISCHARGE INSTRUCTIONS
It is recommended to follow up with your PCP. It is also recommended to contact [address] when you are ready to engage in Drug and Alcohol services including detox and/or rehab. Resources:   [address] D&A: [phone]  [facility] Crisis: [address] Crisis: [phone]      SAFETY PLAN  Warning Signs (thoughts, images, mood, behavior, situations) of a potential crisis: feelings of being lonely, dental pain, gossip, increased drinking      Coping Skills (what can I do to take my mind off the problem, or to keep myself safe): reading, watching TV, talking with friends      Outside Support (who can I reach out to for support and help): Friends, Mobile Crisis, [facility] Crisis.          National Suicide Prevention Hotline:  [address] [place] [address] [phone] - LVF Crisis/Mobile Crisis   [phone] - SLPF Crisis   [place]: [phone]  [facility] [place]: [address] [place] [address] [address]   [phone] - Peer Support Talk Line (1-9pm daily)  [phone] - [place] (1-9pm daily)  [phone] - [facility]

## 2023-11-25 NOTE — ED PROVIDER NOTES
History  Chief Complaint   Patient presents with    Psychiatric Evaluation     Patient feeling "depressed after the holidays."  Denies current SI or HI. Patient has considered "drowning himself in the bathtub because I don't like pain."     62-year-old male with history of alcohol abuse, anxiety, depression, hypertension, status post nephrectomy and medication noncompliance called ambulance because he says he is very lonely and the holidays make his loneliness worse. He states that he has been "dropped" by the [place]. He does see Dr. Miko Briseno who prescribes his medications but patient mitts to being noncompliant with his medications. He was to drinking over 2 gallons of gin per week. She is lonely since his ex partner left him. Partner does live on another floor than his in the [address] [place] [address],[address]. Patient tells me that if he had a gun he would kill that person but when I asked him if he still feels that way he says no. He apparently told the nurse that he wanted to drown himself in the bathtub but tells me that he has thought of that in the past but has no plans to do that and would not be able to do that by himself. When asked if he needs alcohol rehab inpatient disposition but he says no he is tried rehab several times in a month or so he goes back to drinking. He denies hallucinations. He is unclear about prior seizure history. He does not believe it was from alcohol withdrawal.  He does know he takes an anticonvulsant, when he does take his medicines, but does not know the name of it. He denies recent illness or injury. He does admit to some prior episodic blood in his stool and states this is due to the known hemorrhoid. Has not seen recent bleeding. Prior to Admission Medications   Prescriptions Last Dose Informant Patient Reported? Taking?    FLUoxetine (PROzac) 20 mg capsule   Yes No   Sig: Take 20 mg by mouth every morning   amLODIPine (NORVASC) 10 mg tablet  Self Yes No   docusate sodium (COLACE) 100 mg capsule  Self No No   Sig: Take 1 capsule (100 mg total) by mouth every 12 (twelve) hours   levETIRAcetam (KEPPRA) 500 mg tablet   Yes No   Sig: Take 500 mg by mouth 2 (two) times a day   lisinopril (ZESTRIL) 5 mg tablet   No No   Sig: Take 1 tablet (5 mg total) by mouth daily Do not start before October 27, 2022. pantoprazole (PROTONIX) 40 mg tablet  Self Yes No      Facility-Administered Medications: None       Past Medical History:   Diagnosis Date    Alcohol abuse     Anxiety     Cancer ([address])     kidney    Depression     GERD (gastroesophageal reflux disease)     Hypertension        Past Surgical History:   Procedure Laterality Date    NEPHRECTOMY Left     UMBILICAL HERNIA REPAIR         Family History   Problem Relation Age of Onset    Colon cancer Neg Hx     Colon polyps Neg Hx      I have reviewed and agree with the history as documented. E-Cigarette/Vaping    E-Cigarette Use Never User      E-Cigarette/Vaping Substances    Nicotine No     THC No     CBD No     Flavoring No     Other No     Unknown No      Social History     Tobacco Use    Smoking status: Never    Smokeless tobacco: Never   Vaping Use    Vaping Use: Never used   Substance Use Topics    Alcohol use: Yes     Comment: a gallon of gin a week    Drug use: Never       Review of Systems   Constitutional:  Negative for fever. HENT:  Positive for hearing loss (chronic). Negative for congestion and sore throat. Eyes:  Negative for visual disturbance. Respiratory:  Negative for cough and shortness of breath. Cardiovascular:  Negative for chest pain, palpitations and leg swelling. Gastrointestinal:  Positive for blood in stool (in the past). Negative for abdominal pain, diarrhea and vomiting. Genitourinary:  Negative for dysuria and flank pain. Musculoskeletal:  Negative for myalgias. Skin:  Negative for rash. Neurological:  Positive for headaches (Rare throbbing in head).  Negative for dizziness and speech difficulty. Psychiatric/Behavioral:  Positive for dysphoric mood and suicidal ideas (passive). Negative for self-injury. The patient is nervous/anxious. Physical Exam  Physical Exam  Vitals and nursing note reviewed. Constitutional:       General: He is not in acute distress. Appearance: He is well-developed. He is not ill-appearing or diaphoretic. HENT:      Head: Normocephalic and atraumatic. Right Ear: External ear normal.      Left Ear: External ear normal.      Nose: Nose normal.      Mouth/Throat:      Mouth: Mucous membranes are moist.      Pharynx: Oropharynx is clear. Eyes:      General: No scleral icterus. Conjunctiva/sclera: Conjunctivae normal.   Neck:      Vascular: No JVD. Cardiovascular:      Rate and Rhythm: Normal rate and regular rhythm. Pulses: Normal pulses. Heart sounds: Normal heart sounds. Pulmonary:      Effort: Pulmonary effort is normal. No respiratory distress. Breath sounds: Normal breath sounds. Abdominal:      General: Abdomen is flat. Bowel sounds are normal.      Palpations: Abdomen is soft. There is no mass. Tenderness: There is no abdominal tenderness. Musculoskeletal:         General: No tenderness. Normal range of motion. Cervical back: Neck supple. Right lower leg: Edema present. Left lower leg: Edema present. Skin:     General: Skin is warm and dry. Capillary Refill: Capillary refill takes less than 2 seconds. Findings: No rash. Neurological:      General: No focal deficit present. Mental Status: He is alert and oriented to person, place, and time. Mental status is at baseline. Cranial Nerves: No cranial nerve deficit. Sensory: No sensory deficit. Motor: No weakness. Coordination: Coordination normal.      Gait: Gait normal.      Deep Tendon Reflexes: Reflexes are normal and symmetric.    Psychiatric:         Attention and Perception: Attention normal.         Mood and Affect: Mood is depressed. Speech: Speech normal.         Behavior: Behavior normal. Behavior is cooperative. Thought Content: Thought content is not delusional. Thought content does not include homicidal or suicidal plan.          Cognition and Memory: Cognition and memory normal.         Vital Signs  ED Triage Vitals   Temperature Pulse Respirations Blood Pressure SpO2   11/24/23 2102 11/24/23 2052 11/24/23 2052 11/24/23 2052 11/24/23 2052   97.5 °F (36.4 °C) 94 18 158/84 98 %      Temp Source Heart Rate Source Patient Position - Orthostatic VS BP Location FiO2 (%)   11/24/23 2102 11/24/23 2052 11/24/23 2052 11/24/23 2052 --   Oral Monitor Sitting Left arm       Pain Score       --                  Vitals:    11/24/23 2200 11/25/23 0100 11/25/23 0402 11/25/23 0500   BP: 144/61 122/79 137/72 128/78   Pulse: 81 74 98 92   Patient Position - Orthostatic VS: Lying  Lying          Visual Acuity      ED Medications  Medications   sodium chloride 0.9 % bolus 1,000 mL (0 mL Intravenous Stopped 11/24/23 2301)       Diagnostic Studies  Results Reviewed       Procedure Component Value Units Date/Time    POCT alcohol breath test [[id number]]  (Normal) Resulted: 11/25/23 0611    Lab Status: Final result Updated: 11/25/23 0611     EXTBreath Alcohol 0.111    POCT alcohol breath test [[id number]]  (Normal) Resulted: 11/25/23 0405    Lab Status: Final result Updated: 11/25/23 0405     EXTBreath Alcohol 0.152    Magnesium [[id number]]  (Normal) Collected: 11/24/23 2201    Lab Status: Final result Specimen: Blood from Arm, Right Updated: 11/25/23 0101     Magnesium 1.9 mg/dL     Urine Microscopic [[id number]]  (Normal) Collected: 11/24/23 2144    Lab Status: Final result Specimen: Urine, Clean Catch Updated: 11/24/23 2315     RBC, UA 0-1 /hpf      WBC, UA None Seen /hpf      Epithelial Cells Occasional /hpf      Bacteria, UA None Seen /hpf      MUCUS THREADS None Seen    TSH, 3rd generation with Free T4 reflex [[id number]] (Normal) Collected: 11/24/23 2201    Lab Status: Final result Specimen: Blood from Arm, Right Updated: 11/24/23 2249     TSH 3RD GENERATON 1.509 uIU/mL     Ethanol [[id number]]  (Abnormal) Collected: 11/24/23 2201    Lab Status: Final result Specimen: Blood from Arm, Right Updated: 11/24/23 2239     Ethanol Lvl 278 mg/dL     Comprehensive metabolic panel [[id number]]  (Abnormal) Collected: 11/24/23 2201    Lab Status: Final result Specimen: Blood from Arm, Right Updated: 11/24/23 2239     Sodium 138 mmol/L      Potassium 4.1 mmol/L      Chloride 105 mmol/L      CO2 21 mmol/L      ANION GAP 12 mmol/L      BUN 14 mg/dL      Creatinine 1.23 mg/dL      Glucose 108 mg/dL      Calcium 9.5 mg/dL      AST 47 U/L      ALT 54 U/L      Alkaline Phosphatase 59 U/L      Total Protein 7.9 g/dL      Albumin 4.5 g/dL      Total Bilirubin 0.78 mg/dL      eGFR 60 ml/min/1.73sq m     Narrative:      National Kidney Disease Foundation guidelines for Chronic Kidney Disease (CKD):     Stage 1 with normal or high GFR (GFR > 90 mL/min/1.73 square meters)    Stage 2 Mild CKD (GFR = 60-89 mL/min/1.73 square meters)    Stage 3A Moderate CKD (GFR = 45-59 mL/min/1.73 square meters)    Stage 3B Moderate CKD (GFR = 30-44 mL/min/1.73 square meters)    Stage 4 Severe CKD (GFR = 15-29 mL/min/1.73 square meters)    Stage 5 End Stage CKD (GFR <15 mL/min/1.73 square meters)  Note: GFR calculation is accurate only with a steady state creatinine    CBC and differential [[id number]] Collected: 11/24/23 2201    Lab Status: Final result Specimen: Blood from Arm, Right Updated: 11/24/23 2214     WBC 5.94 Thousand/uL      RBC 3.97 Million/uL      Hemoglobin 12.6 g/dL      Hematocrit 38.0 %      MCV 96 fL      MCH 31.7 pg      MCHC 33.2 g/dL      RDW 13.4 %      MPV 9.9 fL      Platelets 629 Thousands/uL      nRBC 0 /100 WBCs      Neutrophils Relative 52 %      Immat GRANS % 0 %      Lymphocytes Relative 32 %      Monocytes Relative 9 %      Eosinophils Relative 6 %      Basophils Relative 1 %      Neutrophils Absolute 3.09 Thousands/µL      Immature Grans Absolute 0.02 Thousand/uL      Lymphocytes Absolute 1.88 Thousands/µL      Monocytes Absolute 0.53 Thousand/µL      Eosinophils Absolute 0.38 Thousand/µL      Basophils Absolute 0.04 Thousands/µL     Rapid drug screen, urine [[id number]] Collected: 11/24/23 2144    Lab Status: Final result Specimen: Urine, Catheter Updated: 11/24/23 2203     Amph/Meth UR Negative     Barbiturate Ur Negative     Benzodiazepine Urine Negative     Cocaine Urine Negative     Methadone Urine --     Opiate Urine Negative     PCP Ur Negative     THC Urine Negative     Oxycodone Urine Negative    Narrative:      Methadone test not performed, if required call laboratory. FOR MEDICAL PURPOSES ONLY. IF CONFIRMATION NEEDED PLEASE CONTACT THE LAB WITHIN 5 DAYS. Drug Screen Cutoff Levels:  AMPHETAMINE/METHAMPHETAMINES  1000 ng/mL  BARBITURATES     200 ng/mL  BENZODIAZEPINES     200 ng/mL  COCAINE      300 ng/mL  METHADONE      300 ng/mL  OPIATES      300 ng/mL  PHENCYCLIDINE     25 ng/mL  THC       50 ng/mL  OXYCODONE      100 ng/mL    UA (URINE) with reflex to Scope [[id number]]  (Abnormal) Collected: 11/24/23 2144    Lab Status: Final result Specimen: Urine, Clean Catch Updated: 11/24/23 2154     Color, UA Yellow     Clarity, UA Clear     Specific Gravity, UA 1.020     pH, UA 5.0     Leukocytes, UA Negative     Nitrite, UA Negative     Protein, UA Trace mg/dl      Glucose, UA Negative mg/dl      Ketones, UA Negative mg/dl      Urobilinogen, UA <2.0 mg/dl      Bilirubin, UA Negative     Occult Blood, UA Negative                   No orders to display              Procedures  Procedures         ED Course  ED Course as of 11/26/23 1050   Sat Nov 25, 2023   0650 Point-of-care breath alcohol was 0.111 at approximately 6:30 AM.  Signed out to Dr. Calista Damon. When alcohol level is below 0.80 he will be medically cleared and will need crisis evaluation.   If they feel he is suitable for discharge he should have [facility] warm handoff. Patient denied active thoughts of suicide or homicide during my exam but alcohol level was above legal limits at that time. SBIRT 22yo+      Flowsheet Row Most Recent Value   Initial Alcohol Screen: US AUDIT-C     1. How often do you have a drink containing alcohol? 6 Filed at: 11/24/2023 2057   2. How many drinks containing alcohol do you have on a typical day you are drinking? 6 Filed at: 11/24/2023 2057   3a. Male UNDER 65: How often do you have five or more drinks on one occasion? 6 Filed at: 11/24/2023 2057   3b. FEMALE Any Age, or MALE 65+: How often do you have 4 or more drinks on one occassion? 6 Filed at: 11/24/2023 2057   Audit-C Score 24 Filed at: 11/24/2023 2057   Full Alcohol Screen: US AUDIT    4. How often during the last year have you found that you were not able to stop drinking once you had started? 4 Filed at: 11/24/2023 2057   5. How often during past year have you failed to do what was normally expected of you because of drinking? 4 Filed at: 11/24/2023 2057   6. How often in past year have you needed a first drink in the morning to get yourself going after a heavy drinking session? 4 Filed at: 11/24/2023 2057   7. How often in past year have you had feeling of guilt or remorse after drinking? 4 Filed at: 11/24/2023 2057   8. How often in past year have you been unable to remember what happened night before because you had been drinking? 4 Filed at: 11/24/2023 2057   9. Have you or someone else been injured as a result of your drinking? 0 Filed at: 11/24/2023 2057   10. Has a relative, friend, doctor or other health worker been concerned about your drinking and suggested you cut down? 4 Filed at: 11/24/2023 2057   AUDIT Total Score 48 Filed at: 11/24/2023 2057   GERARD: How many times in the past year have you. ..     Used an illegal drug or used a prescription medication for non-medical reasons? Never Filed at: 11/24/2023 2057                      Medical Decision Making  36-year-old male with anxiety depression, hearing impairment and alcohol abuse states he is here because he is lonely. He does endorse some vague chronic thoughts of suicide without any plan. He denies HI at this time. Denies hallucinations. Does not wish to be considered for detox. States he just needs someone to talk to. He does admit after long discussion that he has friends especially a best friend where he lives. He is not safe housing but he does not like living there. Will check for medical clearance and discussed with crisis. Amount and/or Complexity of Data Reviewed  Independent Historian: EMS  External Data Reviewed: notes. Labs: ordered. Disposition  Final diagnoses:   Alcohol abuse   Depression     Time reflects when diagnosis was documented in both MDM as applicable and the Disposition within this note       Time User Action Codes Description Comment    11/25/2023  9:20 AM Cristy King [F10.10] Alcohol abuse     11/25/2023  9:20 AM Cristy King [place]. A] Depression           ED Disposition       ED Disposition   Discharge    Condition   Stable    Date/Time   Sat Nov 25, 2023 [facility] discharge to home/self care. MD Documentation      Abi Gillespie Most Recent Value   Sending MD Dr. Leia Slaughter up With Specialties Details Why Contact Info Additional Information    Tommy Busby MD Internal Medicine   [facility].   [address] Emergency Department Emergency Medicine  As needed, If symptoms worsen [facility] [id number]  [phone] [address] [place] [address] Emergency Department, [facility], [facility], [address] [place] [address],[address]    [employer] [address]  [phone]               Discharge Medication List as of 11/25/2023  9:20 AM        CONTINUE these medications which have NOT CHANGED    Details   amLODIPine (NORVASC) 10 mg tablet Historical Med      docusate sodium (COLACE) 100 mg capsule Take 1 capsule (100 mg total) by mouth every 12 (twelve) hours, Starting Fri 10/29/2021, Normal      FLUoxetine (PROzac) 20 mg capsule Take 20 mg by mouth every morning, Starting Wed 6/15/2022, Historical Med      levETIRAcetam (KEPPRA) 500 mg tablet Take 500 mg by mouth 2 (two) times a day, Starting Mon 7/11/2022, Historical Med      lisinopril (ZESTRIL) 5 mg tablet Take 1 tablet (5 mg total) by mouth daily Do not start before October 27, 2022., Starting Thu 10/27/2022, Normal      pantoprazole (PROTONIX) 40 mg tablet Historical Med             No discharge procedures on file.     PDMP Review         Value Time User    PDMP Reviewed  Yes 10/25/2022  4:29 PM Dorsie Castleman, PA-C            ED Provider  Electronically Signed by             Joyce Hancock DO  11/26/23 3743

## 2023-11-25 NOTE — ED NOTES
Crisis met with patient to complete a crisis intake and safety risk assessment. Now sober, patient denies SI/HI/AVH/SIB. He reports that when he is intoxicated that he becomes lonely and then will state he is suicidal and thoughts to hurt an ex partner, however, has not engaged in harming himself or anyone else while under the influence. He states that he drinks from the time he wakes up to when he goes to bed. He states he is not interested in drinking until after the new year. He appears to be in the precontemplation stage of change in regards to his drinking. Patient denies the use of any other substances. Patient is currently connected to [facility] for medication management only and is on the waitlist for OP services. He reported that he has been in and out of rehab/detox his whole life. Patient currently lives at the [facility] where he says feels stressful due to people gossiping. He states that another stressor is his dental pain. Patient reports difficulty sleeping and eating due to his dental pain. He denies any access to weapons. Patient declined wanting to be referred to [facility]. Patient is interested in discharging back to his apartment and declining all resources.

## 2024-04-09 ENCOUNTER — TELEPHONE (OUTPATIENT)
Dept: PSYCHIATRY | Facility: CLINIC | Age: 69
End: 2024-04-09

## 2024-04-09 ENCOUNTER — HOSPITAL ENCOUNTER (EMERGENCY)
Facility: HOSPITAL | Age: 69
Discharge: HOME | End: 2024-04-09
Attending: EMERGENCY MEDICINE
Payer: COMMERCIAL

## 2024-04-09 VITALS
HEART RATE: 82 BPM | DIASTOLIC BLOOD PRESSURE: 75 MMHG | RESPIRATION RATE: 18 BRPM | OXYGEN SATURATION: 96 % | SYSTOLIC BLOOD PRESSURE: 145 MMHG | TEMPERATURE: 97.8 F

## 2024-04-09 DIAGNOSIS — F10.10 ETOH ABUSE: Primary | ICD-10-CM

## 2024-04-09 DIAGNOSIS — F32.A DEPRESSION: ICD-10-CM

## 2024-04-09 LAB
ALBUMIN SERPL BCP-MCNC: 4.6 G/DL (ref 3.5–5)
ALP SERPL-CCNC: 55 U/L (ref 34–104)
ALT SERPL W P-5'-P-CCNC: 50 U/L (ref 7–52)
AMPHETAMINES SERPL QL SCN: NEGATIVE
ANION GAP SERPL CALCULATED.3IONS-SCNC: 10 MMOL/L (ref 4–13)
AST SERPL W P-5'-P-CCNC: 66 U/L (ref 13–39)
BARBITURATES UR QL: NEGATIVE
BASOPHILS # BLD AUTO: 0.05 THOUSANDS/ÂΜL (ref 0–0.1)
BASOPHILS NFR BLD AUTO: 1 % (ref 0–1)
BENZODIAZ UR QL: NEGATIVE
BILIRUB SERPL-MCNC: 0.78 MG/DL (ref 0.2–1)
BUN SERPL-MCNC: 13 MG/DL (ref 5–25)
CALCIUM SERPL-MCNC: 9.7 MG/DL (ref 8.4–10.2)
CHLORIDE SERPL-SCNC: 103 MMOL/L (ref 96–108)
CO2 SERPL-SCNC: 24 MMOL/L (ref 21–32)
COCAINE UR QL: NEGATIVE
CREAT SERPL-MCNC: 1.04 MG/DL (ref 0.6–1.3)
EOSINOPHIL # BLD AUTO: 0.42 THOUSAND/ÂΜL (ref 0–0.61)
EOSINOPHIL NFR BLD AUTO: 6 % (ref 0–6)
ERYTHROCYTE [DISTWIDTH] IN BLOOD BY AUTOMATED COUNT: 14.8 % (ref 11.6–15.1)
ETHANOL EXG-MCNC: 0.14 MG/DL
ETHANOL EXG-MCNC: 0.15 MG/DL
ETHANOL EXG-MCNC: 0.22 MG/DL
ETHANOL EXG-MCNC: 0.72 MG/DL
FENTANYL UR QL SCN: NEGATIVE
GFR SERPL CREATININE-BSD FRML MDRD: 73 ML/MIN/1.73SQ M
GLUCOSE SERPL-MCNC: 115 MG/DL (ref 65–140)
HCT VFR BLD AUTO: 39.5 % (ref 36.5–49.3)
HGB BLD-MCNC: 13.5 G/DL (ref 12–17)
HYDROCODONE UR QL SCN: NEGATIVE
IMM GRANULOCYTES # BLD AUTO: 0.03 THOUSAND/UL (ref 0–0.2)
IMM GRANULOCYTES NFR BLD AUTO: 0 % (ref 0–2)
LYMPHOCYTES # BLD AUTO: 3.4 THOUSANDS/ÂΜL (ref 0.6–4.47)
LYMPHOCYTES NFR BLD AUTO: 49 % (ref 14–44)
MCH RBC QN AUTO: 31.5 PG (ref 26.8–34.3)
MCHC RBC AUTO-ENTMCNC: 34.2 G/DL (ref 31.4–37.4)
MCV RBC AUTO: 92 FL (ref 82–98)
METHADONE UR QL: NEGATIVE
MONOCYTES # BLD AUTO: 0.66 THOUSAND/ÂΜL (ref 0.17–1.22)
MONOCYTES NFR BLD AUTO: 10 % (ref 4–12)
NEUTROPHILS # BLD AUTO: 2.31 THOUSANDS/ÂΜL (ref 1.85–7.62)
NEUTS SEG NFR BLD AUTO: 34 % (ref 43–75)
NRBC BLD AUTO-RTO: 0 /100 WBCS
OPIATES UR QL SCN: NEGATIVE
OXYCODONE+OXYMORPHONE UR QL SCN: NEGATIVE
PCP UR QL: NEGATIVE
PLATELET # BLD AUTO: 296 THOUSANDS/UL (ref 149–390)
PMV BLD AUTO: 9.7 FL (ref 8.9–12.7)
POTASSIUM SERPL-SCNC: 3.9 MMOL/L (ref 3.5–5.3)
PROT SERPL-MCNC: 7.9 G/DL (ref 6.4–8.4)
RBC # BLD AUTO: 4.28 MILLION/UL (ref 3.88–5.62)
SODIUM SERPL-SCNC: 137 MMOL/L (ref 135–147)
THC UR QL: NEGATIVE
TSH SERPL DL<=0.05 MIU/L-ACNC: 1.54 UIU/ML (ref 0.45–4.5)
WBC # BLD AUTO: 6.87 THOUSAND/UL (ref 4.31–10.16)

## 2024-04-09 PROCEDURE — 80053 COMPREHEN METABOLIC PANEL: CPT | Performed by: EMERGENCY MEDICINE

## 2024-04-09 PROCEDURE — 85025 COMPLETE CBC W/AUTO DIFF WBC: CPT | Performed by: EMERGENCY MEDICINE

## 2024-04-09 PROCEDURE — 82075 ASSAY OF BREATH ETHANOL: CPT | Performed by: EMERGENCY MEDICINE

## 2024-04-09 PROCEDURE — 82075 ASSAY OF BREATH ETHANOL: CPT

## 2024-04-09 PROCEDURE — 36415 COLL VENOUS BLD VENIPUNCTURE: CPT | Performed by: EMERGENCY MEDICINE

## 2024-04-09 PROCEDURE — 84443 ASSAY THYROID STIM HORMONE: CPT | Performed by: EMERGENCY MEDICINE

## 2024-04-09 PROCEDURE — 80307 DRUG TEST PRSMV CHEM ANLYZR: CPT | Performed by: EMERGENCY MEDICINE

## 2024-04-09 PROCEDURE — 99285 EMERGENCY DEPT VISIT HI MDM: CPT | Performed by: EMERGENCY MEDICINE

## 2024-04-09 PROCEDURE — 93005 ELECTROCARDIOGRAM TRACING: CPT

## 2024-04-09 RX ORDER — LISINOPRIL 5 MG/1
5 TABLET ORAL DAILY
Status: DISCONTINUED | OUTPATIENT
Start: 2024-04-09 | End: 2024-04-10 | Stop reason: HOSPADM

## 2024-04-09 RX ORDER — DOCUSATE SODIUM 100 MG/1
100 CAPSULE, LIQUID FILLED ORAL 2 TIMES DAILY
Status: DISCONTINUED | OUTPATIENT
Start: 2024-04-09 | End: 2024-04-10 | Stop reason: HOSPADM

## 2024-04-09 RX ORDER — LANOLIN ALCOHOL/MO/W.PET/CERES
100 CREAM (GRAM) TOPICAL DAILY
Status: DISCONTINUED | OUTPATIENT
Start: 2024-04-09 | End: 2024-04-10 | Stop reason: HOSPADM

## 2024-04-09 RX ORDER — AMLODIPINE BESYLATE 5 MG/1
10 TABLET ORAL DAILY
Status: DISCONTINUED | OUTPATIENT
Start: 2024-04-09 | End: 2024-04-10 | Stop reason: HOSPADM

## 2024-04-09 RX ORDER — PANTOPRAZOLE SODIUM 40 MG/1
40 TABLET, DELAYED RELEASE ORAL
Status: DISCONTINUED | OUTPATIENT
Start: 2024-04-10 | End: 2024-04-10 | Stop reason: HOSPADM

## 2024-04-09 RX ORDER — FLUOXETINE HYDROCHLORIDE 20 MG/1
20 CAPSULE ORAL DAILY
Status: DISCONTINUED | OUTPATIENT
Start: 2024-04-10 | End: 2024-04-10 | Stop reason: HOSPADM

## 2024-04-09 RX ORDER — FOLIC ACID 1 MG/1
1 TABLET ORAL DAILY
Status: DISCONTINUED | OUTPATIENT
Start: 2024-04-09 | End: 2024-04-10 | Stop reason: HOSPADM

## 2024-04-09 RX ORDER — LEVETIRACETAM 500 MG/1
500 TABLET ORAL 2 TIMES DAILY
Status: DISCONTINUED | OUTPATIENT
Start: 2024-04-09 | End: 2024-04-10 | Stop reason: HOSPADM

## 2024-04-09 RX ADMIN — LEVETIRACETAM 500 MG: 500 TABLET, FILM COATED ORAL at 22:00

## 2024-04-09 RX ADMIN — FOLIC ACID 1 MG: 1 TABLET ORAL at 15:00

## 2024-04-09 RX ADMIN — MULTIPLE VITAMINS W/ MINERALS TAB 1 TABLET: TAB ORAL at 15:00

## 2024-04-09 RX ADMIN — AMLODIPINE BESYLATE 10 MG: 5 TABLET ORAL at 15:00

## 2024-04-09 RX ADMIN — Medication 100 MG: at 15:00

## 2024-04-09 RX ADMIN — LISINOPRIL 5 MG: 5 TABLET ORAL at 15:00

## 2024-04-09 NOTE — ED PROVIDER NOTES
History  Chief Complaint   Patient presents with    Depression     Pt to er via police from his home with reports that he has been feeling suicidal on and off for the past couple weeks. Not currently suicidal, no plan. Wanted to come check in because he wants to get better. Police report possible ETOH     This is a 68-year-old male presents for evaluation of depression suicidal ideations and alcohol abuse does not have any specific plan at this time.      History provided by:  Patient  Medical Problem  Location:  Generalized  Quality:  Depression with suicidal ideation  Severity:  Severe  Onset quality:  Gradual  Duration:  12 months  Timing:  Constant  Progression:  Waxing and waning  Chronicity:  Chronic  Context:  Alcohol abuse depression and suicidal ideation  Worsened by:  Alcohol abuse      Prior to Admission Medications   Prescriptions Last Dose Informant Patient Reported? Taking?   FLUoxetine (PROzac) 20 mg capsule   Yes No   Sig: Take 20 mg by mouth every morning   amLODIPine (NORVASC) 10 mg tablet  Self Yes No   docusate sodium (COLACE) 100 mg capsule  Self No No   Sig: Take 1 capsule (100 mg total) by mouth every 12 (twelve) hours   levETIRAcetam (KEPPRA) 500 mg tablet   Yes No   Sig: Take 500 mg by mouth 2 (two) times a day   lisinopril (ZESTRIL) 5 mg tablet   No No   Sig: Take 1 tablet (5 mg total) by mouth daily Do not start before October 27, 2022.   pantoprazole (PROTONIX) 40 mg tablet  Self Yes No      Facility-Administered Medications: None       Past Medical History:   Diagnosis Date    Alcohol abuse     Anxiety     Cancer (HCC)     kidney    Depression     GERD (gastroesophageal reflux disease)     Hypertension        Past Surgical History:   Procedure Laterality Date    NEPHRECTOMY Left     UMBILICAL HERNIA REPAIR         Family History   Problem Relation Age of Onset    Colon cancer Neg Hx     Colon polyps Neg Hx      I have reviewed and agree with the history as  documented.    E-Cigarette/Vaping    E-Cigarette Use Never User      E-Cigarette/Vaping Substances    Nicotine No     THC No     CBD No     Flavoring No     Other No     Unknown No      Social History     Tobacco Use    Smoking status: Never    Smokeless tobacco: Never   Vaping Use    Vaping status: Never Used   Substance Use Topics    Alcohol use: Yes     Comment: a gallon of gin a week    Drug use: Never       Review of Systems   Psychiatric/Behavioral:  Positive for dysphoric mood and suicidal ideas.    All other systems reviewed and are negative.      Physical Exam  Physical Exam  Vitals and nursing note reviewed.   Constitutional:       General: He is not in acute distress.     Appearance: He is not ill-appearing, toxic-appearing or diaphoretic.   HENT:      Head: Normocephalic and atraumatic.      Right Ear: External ear normal.      Left Ear: External ear normal.   Eyes:      Extraocular Movements: Extraocular movements intact.      Pupils: Pupils are equal, round, and reactive to light.   Cardiovascular:      Rate and Rhythm: Regular rhythm. Tachycardia present.      Pulses: Normal pulses.      Heart sounds: No murmur heard.     No friction rub. No gallop.   Pulmonary:      Effort: Pulmonary effort is normal. No respiratory distress.      Breath sounds: No stridor. No wheezing, rhonchi or rales.   Abdominal:      General: There is no distension.      Palpations: Abdomen is soft.      Tenderness: There is no abdominal tenderness. There is no guarding or rebound.   Musculoskeletal:         General: No swelling, tenderness, deformity or signs of injury. Normal range of motion.      Cervical back: Normal range of motion and neck supple. No rigidity.      Right lower leg: No edema.      Left lower leg: No edema.   Skin:     General: Skin is warm and dry.      Findings: No erythema or rash.   Neurological:      General: No focal deficit present.      Mental Status: He is alert and oriented to person, place, and  time.      Cranial Nerves: No cranial nerve deficit.      Sensory: No sensory deficit.      Coordination: Coordination normal.   Psychiatric:      Comments: Depressed affect with suicidal ideation with no specific plan         Vital Signs  ED Triage Vitals   Temperature Pulse Respirations Blood Pressure SpO2   04/09/24 1358 04/09/24 1358 04/09/24 1358 04/09/24 1358 04/09/24 1358   97.8 °F (36.6 °C) 103 18 168/95 95 %      Temp Source Heart Rate Source Patient Position - Orthostatic VS BP Location FiO2 (%)   04/09/24 1358 04/09/24 1358 04/09/24 1358 04/09/24 1358 --   Oral Monitor Sitting Right arm       Pain Score       04/09/24 1457       No Pain           Vitals:    04/09/24 1558 04/09/24 1700 04/09/24 1800 04/09/24 1900   BP: 154/76 160/75 150/76 145/75   Pulse: 97 84 86 82   Patient Position - Orthostatic VS: Lying Lying Lying Lying         Visual Acuity      ED Medications  Medications - No data to display      Diagnostic Studies  Results Reviewed       Procedure Component Value Units Date/Time    POCT alcohol breath test [[id number]]  (Normal) Resulted: 04/09/24 2212    Lab Status: Final result Updated: 04/09/24 2212     EXTBreath Alcohol 0.72    POCT alcohol breath test [[id number]]  (Normal) Resulted: 04/09/24 2022    Lab Status: Final result Updated: 04/09/24 2023     EXTBreath Alcohol 0.135    POCT alcohol breath test [[id number]]  (Normal) Resulted: 04/09/24 1847    Lab Status: Final result Updated: 04/09/24 1847     EXTBreath Alcohol 0.149    Rapid drug screen, urine [[id number]]  (Normal) Collected: 04/09/24 1455    Lab Status: Final result Specimen: Urine, Clean Catch Updated: 04/09/24 1518     Amph/Meth UR Negative     Barbiturate Ur Negative     Benzodiazepine Urine Negative     Cocaine Urine Negative     Methadone Urine Negative     Opiate Urine Negative     PCP Ur Negative     THC Urine Negative     Oxycodone Urine Negative     Fentanyl Urine Negative     HYDROCODONE URINE Negative    Narrative:       FOR MEDICAL PURPOSES ONLY.   IF CONFIRMATION NEEDED PLEASE CONTACT THE LAB WITHIN 5 DAYS.    Drug Screen Cutoff Levels:  AMPHETAMINE/METHAMPHETAMINES  1000 ng/mL  BARBITURATES     200 ng/mL  BENZODIAZEPINES     200 ng/mL  COCAINE      300 ng/mL  METHADONE      300 ng/mL  OPIATES      300 ng/mL  PHENCYCLIDINE     25 ng/mL  THC       50 ng/mL  OXYCODONE      100 ng/mL  FENTANYL      5 ng/mL  HYDROCODONE     300 ng/mL    TSH [[id number]]  (Normal) Collected: 04/09/24 1408    Lab Status: Final result Specimen: Blood from Arm, Right Updated: 04/09/24 1459     TSH 3RD GENERATON 1.536 uIU/mL     Comprehensive metabolic panel [[id number]]  (Abnormal) Collected: 04/09/24 1408    Lab Status: Final result Specimen: Blood from Arm, Right Updated: 04/09/24 1434     Sodium 137 mmol/L      Potassium 3.9 mmol/L      Chloride 103 mmol/L      CO2 24 mmol/L      ANION GAP 10 mmol/L      BUN 13 mg/dL      Creatinine 1.04 mg/dL      Glucose 115 mg/dL      Calcium 9.7 mg/dL      AST 66 U/L      ALT 50 U/L      Alkaline Phosphatase 55 U/L      Total Protein 7.9 g/dL      Albumin 4.6 g/dL      Total Bilirubin 0.78 mg/dL      eGFR 73 ml/min/1.73sq m     Narrative:      National Kidney Disease Foundation guidelines for Chronic Kidney Disease (CKD):     Stage 1 with normal or high GFR (GFR > 90 mL/min/1.73 square meters)    Stage 2 Mild CKD (GFR = 60-89 mL/min/1.73 square meters)    Stage 3A Moderate CKD (GFR = 45-59 mL/min/1.73 square meters)    Stage 3B Moderate CKD (GFR = 30-44 mL/min/1.73 square meters)    Stage 4 Severe CKD (GFR = 15-29 mL/min/1.73 square meters)    Stage 5 End Stage CKD (GFR <15 mL/min/1.73 square meters)  Note: GFR calculation is accurate only with a steady state creatinine    POCT alcohol breath test [[id number]]  (Normal) Resulted: 04/09/24 1414    Lab Status: Final result Updated: 04/09/24 1414     EXTBreath Alcohol 0.218    CBC and differential [[id number]]  (Abnormal) Collected: 04/09/24 1408    Lab Status: Final  result Specimen: Blood from Arm, Right Updated: 04/09/24 1413     WBC 6.87 Thousand/uL      RBC 4.28 Million/uL      Hemoglobin 13.5 g/dL      Hematocrit 39.5 %      MCV 92 fL      MCH 31.5 pg      MCHC 34.2 g/dL      RDW 14.8 %      MPV 9.7 fL      Platelets 296 Thousands/uL      nRBC 0 /100 WBCs      Segmented % 34 %      Immature Grans % 0 %      Lymphocytes % 49 %      Monocytes % 10 %      Eosinophils Relative 6 %      Basophils Relative 1 %      Absolute Neutrophils 2.31 Thousands/µL      Absolute Immature Grans 0.03 Thousand/uL      Absolute Lymphocytes 3.40 Thousands/µL      Absolute Monocytes 0.66 Thousand/µL      Eosinophils Absolute 0.42 Thousand/µL      Basophils Absolute 0.05 Thousands/µL                    No orders to display              Procedures  ECG 12 Lead Documentation Only    Date/Time: 4/9/2024 3:09 PM    Performed by: Marvin Bone DO  Authorized by: Marvin Bone DO    ECG reviewed by me, the ED Provider: yes    Patient location:  ED  Rate:     ECG rate:  86  Rhythm:     Rhythm: sinus rhythm    Conduction:     Conduction: normal    T waves:     T waves: normal             ED Course                               SBIRT 20yo+      Flowsheet Row Most Recent Value   Initial Alcohol Screen: US AUDIT-C     1. How often do you have a drink containing alcohol? 0 Filed at: 04/09/2024 1400   2. How many drinks containing alcohol do you have on a typical day you are drinking?  0 Filed at: 04/09/2024 1400   3a. Male UNDER 65: How often do you have five or more drinks on one occasion? 0 Filed at: 04/09/2024 1400   3b. FEMALE Any Age, or MALE 65+: How often do you have 4 or more drinks on one occassion? 0 Filed at: 04/09/2024 1400   Audit-C Score 0 Filed at: 04/09/2024 1400   GERARD: How many times in the past year have you...    Used an illegal drug or used a prescription medication for non-medical reasons? Never Filed at: 04/09/2024 1400                      Medical Decision Making  Depression  alcohol abuse and suicidal ideations will check labs for medical clearance and then will need to speak with crisis    Amount and/or Complexity of Data Reviewed  Labs: ordered.    Risk  OTC drugs.  Prescription drug management.             Disposition  Final diagnoses:   ETOH abuse   Depression     Time reflects when diagnosis was documented in both MDM as applicable and the Disposition within this note       Time User Action Codes Description Comment    4/9/2024  2:49 PM Marvin Bone Add [R45.851] Suicidal ideations     4/9/2024  2:50 PM Marvin Bone Add [F10.10] ETOH abuse     4/9/2024  9:55 PM Duyen Mcbride Modify [F10.10] ETOH abuse     4/9/2024  9:55 PM Duyen Mcbride Remove [R45.851] Suicidal ideations     4/9/2024  9:55 PM Duyen Mcbride Add [F32.A] Depression           ED Disposition       ED Disposition   Discharge    Condition   Stable    Date/Time   Tue Apr 9, 2024 2226    Comment   Marvin Christianson discharge to home/self care.                   Follow-up Information       Follow up With Specialties Details Why Contact Info Additional Information    Gigi Herndon MD Internal Medicine Schedule an appointment as soon as possible for a visit   [address]  [address]  [facility] [address]  [phone]        [facility] Emergency Department Emergency Medicine  If symptoms worsen [address] [facility] [address] [phone] [facility] Emergency Department, [address] [place], [place] [address]            Discharge Medication List as of 4/9/2024 10:28 PM        CONTINUE these medications which have NOT CHANGED    Details   amLODIPine (NORVASC) 10 mg tablet Historical Med      docusate sodium (COLACE) 100 mg capsule Take 1 capsule (100 mg total) by mouth every 12 (twelve) hours, Starting Fri 10/29/2021, Normal      FLUoxetine (PROzac) 20 mg capsule Take 20 mg by mouth every morning, Starting Wed 6/15/2022, Historical Med       levETIRAcetam (KEPPRA) 500 mg tablet Take 500 mg by mouth 2 (two) times a day, Starting Mon 7/11/2022, Historical Med      lisinopril (ZESTRIL) 5 mg tablet Take 1 tablet (5 mg total) by mouth daily Do not start before October 27, 2022., Starting Thu 10/27/2022, Normal      pantoprazole (PROTONIX) 40 mg tablet Historical Med             No discharge procedures on file.    PDMP Review         Value Time User    PDMP Reviewed  Yes 10/25/2022  4:29 PM Brii Aguayo PA-C            ED Provider  Electronically Signed by             Marvin Bone DO  04/13/24 6146

## 2024-04-09 NOTE — TELEPHONE ENCOUNTER
Intake received a voicemail from [facility] stating patient would like to be seen for therapy services. Patient has been added to the wait list. Valorie will reach out to patient to make him aware.

## 2024-04-10 NOTE — ED CARE HANDOFF
[facility] Warm Handoff Outcome Note    Patient name Marvin Christianson  Location Z1 H1/Z1 H1 MRN [id number]  Age: 68 y.o.          Plan Type:  Warm Handoff                                                                                    Plan Date: 4/9/2024  Service:  ED Warm Handoff      Substance Use History:  ETOH    Warm Handoff Update:  Cl accepted at [facility] w/ placement happening on 4/10/24    Warm Handoff Outcome: Residential  Inpatient

## 2024-04-10 NOTE — ED CARE HANDOFF
Emergency Department Sign Out Note        Sign out and transfer of care from Dr. Bone. See Separate Emergency Department note.     The patient, Marvin Christianson, was evaluated by the previous provider for alcohol abuse.                            ED Course as of 04/09/24 2227 Tue Apr 09, 2024 2112 Re-evaluate once sober   2153 Reevaluation patient resting comfortably, but not currently suicidal, states that he would like help with his alcohol use disorder and be cares is able to place him in [facility], stable for discharge with the [facility] resources at this time   2227 States that he would like to go home to get some close, per [facility] representative no beds at [facility] they will contact him in the morning for placement, he is currently sober, not suicidal, not homicidal,  does not wish to stay for psychiatric evaluation, stable for discharge at this time     Procedures  Medical Decision Making  Amount and/or Complexity of Data Reviewed  Labs: ordered.    Risk  OTC drugs.  Prescription drug management.            Disposition  Final diagnoses:   ETOH abuse   Depression     Time reflects when diagnosis was documented in both MDM as applicable and the Disposition within this note       Time User Action Codes Description Comment    4/9/2024  2:49 PM Marvin Bone Add [R45.851] Suicidal ideations     4/9/2024  2:50 PM Marvin Bone Add [F10.10] ETOH abuse     4/9/2024  9:55 PM Duyen Mcbride Modify [F10.10] ETOH abuse     4/9/2024  9:55 PM Duyen Mcbride Remove [R45.851] Suicidal ideations     4/9/2024  9:55 PM Duyen Mcbride Add [F32.A] Depression           ED Disposition       ED Disposition   Discharge    Condition   Stable    Date/Time   Tue Apr 9, 2024 10:26 PM    Comment   Marvin Christianson discharge to home/self care.                   Follow-up Information       Follow up With Specialties Details Why Contact Info Additional Information    Gigi Herndon MD Internal Medicine Schedule an  appointment as soon as possible for a visit   [address].  [address]  [facility] [address]  [phone]        [facility] Emergency Department Emergency Medicine  If symptoms worsen [address] [facility] [address] [phone] [facility] Emergency Department, [address] [place], [place] [address]          Patient's Medications   Discharge Prescriptions    No medications on file     No discharge procedures on file.       ED Provider  Electronically Signed by     Duyen Mcbride DO  04/09/24 0264     Satisfactory

## 2024-04-10 NOTE — DISCHARGE INSTRUCTIONS
Please follow-up with [facility] resources with plan to admit you to [facility] in the morning

## 2024-04-12 LAB
ATRIAL RATE: 86 BPM
P AXIS: 61 DEGREES
PR INTERVAL: 166 MS
QRS AXIS: -27 DEGREES
QRSD INTERVAL: 76 MS
QT INTERVAL: 352 MS
QTC INTERVAL: 421 MS
T WAVE AXIS: 44 DEGREES
VENTRICULAR RATE: 86 BPM

## 2024-05-06 ENCOUNTER — TELEPHONE (OUTPATIENT)
Dept: PSYCHIATRY | Facility: CLINIC | Age: 69
End: 2024-05-06

## 2024-06-16 ENCOUNTER — APPOINTMENT (EMERGENCY)
Dept: RADIOLOGY | Facility: HOSPITAL | Age: 69
End: 2024-06-16
Payer: COMMERCIAL

## 2024-06-16 ENCOUNTER — APPOINTMENT (EMERGENCY)
Dept: CT IMAGING | Facility: HOSPITAL | Age: 69
End: 2024-06-16
Payer: COMMERCIAL

## 2024-06-16 ENCOUNTER — HOSPITAL ENCOUNTER (EMERGENCY)
Facility: HOSPITAL | Age: 69
Discharge: HOME | End: 2024-06-17
Attending: EMERGENCY MEDICINE | Admitting: EMERGENCY MEDICINE
Payer: COMMERCIAL

## 2024-06-16 DIAGNOSIS — F10.929 ALCOHOL INTOXICATION (HCC): ICD-10-CM

## 2024-06-16 DIAGNOSIS — F10.10 ALCOHOL ABUSE: Primary | ICD-10-CM

## 2024-06-16 LAB
ALBUMIN SERPL BCP-MCNC: 4.9 G/DL (ref 3.5–5)
ALP SERPL-CCNC: 71 U/L (ref 34–104)
ALT SERPL W P-5'-P-CCNC: 39 U/L (ref 7–52)
ANION GAP SERPL CALCULATED.3IONS-SCNC: 10 MMOL/L (ref 4–13)
AST SERPL W P-5'-P-CCNC: 47 U/L (ref 13–39)
BASOPHILS # BLD AUTO: 0.06 THOUSANDS/ÂΜL (ref 0–0.1)
BASOPHILS NFR BLD AUTO: 1 % (ref 0–1)
BILIRUB SERPL-MCNC: 0.91 MG/DL (ref 0.2–1)
BUN SERPL-MCNC: 13 MG/DL (ref 5–25)
CALCIUM SERPL-MCNC: 9.6 MG/DL (ref 8.4–10.2)
CHLORIDE SERPL-SCNC: 101 MMOL/L (ref 96–108)
CO2 SERPL-SCNC: 23 MMOL/L (ref 21–32)
CREAT SERPL-MCNC: 1.2 MG/DL (ref 0.6–1.3)
EOSINOPHIL # BLD AUTO: 0.4 THOUSAND/ÂΜL (ref 0–0.61)
EOSINOPHIL NFR BLD AUTO: 6 % (ref 0–6)
ERYTHROCYTE [DISTWIDTH] IN BLOOD BY AUTOMATED COUNT: 14.1 % (ref 11.6–15.1)
ETHANOL SERPL-MCNC: 336 MG/DL
GFR SERPL CREATININE-BSD FRML MDRD: 62 ML/MIN/1.73SQ M
GLUCOSE SERPL-MCNC: 113 MG/DL (ref 65–140)
HCT VFR BLD AUTO: 41.6 % (ref 36.5–49.3)
HGB BLD-MCNC: 13.9 G/DL (ref 12–17)
IMM GRANULOCYTES # BLD AUTO: 0.03 THOUSAND/UL (ref 0–0.2)
IMM GRANULOCYTES NFR BLD AUTO: 0 % (ref 0–2)
LEVETIRACETAM SERPL-MCNC: <2 UG/ML (ref 12–46)
LIPASE SERPL-CCNC: 19 U/L (ref 11–82)
LYMPHOCYTES # BLD AUTO: 3.38 THOUSANDS/ÂΜL (ref 0.6–4.47)
LYMPHOCYTES NFR BLD AUTO: 47 % (ref 14–44)
MAGNESIUM SERPL-MCNC: 2.1 MG/DL (ref 1.9–2.7)
MCH RBC QN AUTO: 31.8 PG (ref 26.8–34.3)
MCHC RBC AUTO-ENTMCNC: 33.4 G/DL (ref 31.4–37.4)
MCV RBC AUTO: 95 FL (ref 82–98)
MONOCYTES # BLD AUTO: 0.55 THOUSAND/ÂΜL (ref 0.17–1.22)
MONOCYTES NFR BLD AUTO: 8 % (ref 4–12)
NEUTROPHILS # BLD AUTO: 2.66 THOUSANDS/ÂΜL (ref 1.85–7.62)
NEUTS SEG NFR BLD AUTO: 38 % (ref 43–75)
NRBC BLD AUTO-RTO: 0 /100 WBCS
PLATELET # BLD AUTO: 258 THOUSANDS/UL (ref 149–390)
PMV BLD AUTO: 10.1 FL (ref 8.9–12.7)
POTASSIUM SERPL-SCNC: 4.9 MMOL/L (ref 3.5–5.3)
PROT SERPL-MCNC: 8.2 G/DL (ref 6.4–8.4)
RBC # BLD AUTO: 4.37 MILLION/UL (ref 3.88–5.62)
SODIUM SERPL-SCNC: 134 MMOL/L (ref 135–147)
WBC # BLD AUTO: 7.08 THOUSAND/UL (ref 4.31–10.16)

## 2024-06-16 PROCEDURE — 82077 ASSAY SPEC XCP UR&BREATH IA: CPT | Performed by: EMERGENCY MEDICINE

## 2024-06-16 PROCEDURE — 74177 CT ABD & PELVIS W/CONTRAST: CPT

## 2024-06-16 PROCEDURE — 99284 EMERGENCY DEPT VISIT MOD MDM: CPT

## 2024-06-16 PROCEDURE — 83520 IMMUNOASSAY QUANT NOS NONAB: CPT | Performed by: EMERGENCY MEDICINE

## 2024-06-16 PROCEDURE — 70450 CT HEAD/BRAIN W/O DYE: CPT

## 2024-06-16 PROCEDURE — 99285 EMERGENCY DEPT VISIT HI MDM: CPT | Performed by: EMERGENCY MEDICINE

## 2024-06-16 PROCEDURE — 96361 HYDRATE IV INFUSION ADD-ON: CPT

## 2024-06-16 PROCEDURE — 80053 COMPREHEN METABOLIC PANEL: CPT | Performed by: EMERGENCY MEDICINE

## 2024-06-16 PROCEDURE — 96365 THER/PROPH/DIAG IV INF INIT: CPT

## 2024-06-16 PROCEDURE — 72170 X-RAY EXAM OF PELVIS: CPT

## 2024-06-16 PROCEDURE — 85025 COMPLETE CBC W/AUTO DIFF WBC: CPT | Performed by: EMERGENCY MEDICINE

## 2024-06-16 PROCEDURE — 72125 CT NECK SPINE W/O DYE: CPT

## 2024-06-16 PROCEDURE — 71045 X-RAY EXAM CHEST 1 VIEW: CPT

## 2024-06-16 PROCEDURE — 71260 CT THORAX DX C+: CPT

## 2024-06-16 PROCEDURE — 83690 ASSAY OF LIPASE: CPT | Performed by: EMERGENCY MEDICINE

## 2024-06-16 PROCEDURE — 36415 COLL VENOUS BLD VENIPUNCTURE: CPT | Performed by: EMERGENCY MEDICINE

## 2024-06-16 PROCEDURE — 83735 ASSAY OF MAGNESIUM: CPT | Performed by: EMERGENCY MEDICINE

## 2024-06-16 RX ADMIN — SODIUM CHLORIDE 1000 ML: 0.9 INJECTION, SOLUTION INTRAVENOUS at 20:48

## 2024-06-16 RX ADMIN — IOHEXOL 100 ML: 350 INJECTION, SOLUTION INTRAVENOUS at 19:51

## 2024-06-16 RX ADMIN — THIAMINE HYDROCHLORIDE: 100 INJECTION, SOLUTION INTRAMUSCULAR; INTRAVENOUS at 20:02

## 2024-06-16 NOTE — ED PROVIDER NOTES
Emergency Department Trauma Note  Marvin Christianson 68 y.o. male MRN: [id number]  Unit/Bed#: ED 03/ED 03 Encounter: [id number]      Trauma Alert: Trauma Acuity: Trauma Evaluation  Model of Arrival: Mode of Arrival: BLS via    Trauma Team: Current Providers  Attending Provider: Lubna Bettencourt MD  Registered Nurse: Main Rosenthal, RN  Registered Nurse: Rao Putnam, RN  Consultants:     None      History of Present Illness     Chief Complaint:   Chief Complaint   Patient presents with    Fall     Patient arrives to ED via EMS from home after falling in bathroom. Unwitnessed, unknown head strike. MIR blood thinners. EMS reports daily drinker. Denies pain.      HPI:  Marvin Christianson is a 68 y.o. male who presents with fall while intoxicated.  Mechanism:Details of Incident: unwitnessed fall Injury Date: 06/16/24 Injury Time: 1902      68 year old male brought by EMS for evaluation after falling after getting up to go to the bathroom.  Patient does not recall details of the fall and does not know if he struck his head.  He states that he was told to come to the hospital if anything happens which prompted him to call EMS.  He states he has had some abdominal pain for the past week.  No new pain since falling.  He is intoxicated on arrival and has been drinking gin and tonic.  Patient states he takes 6 medications, but does not know what they are.  He is unable to tell me the year.      Fall    Review of Systems    Historical Information     Immunizations:   Immunization History   Administered Date(s) Administered    COVID-19 Moderna mRNA Vaccine 12 Yr+ 50 mcg/0.5 mL (Spikevax) 10/30/2023    COVID-19 PFIZER VACCINE 0.3 ML IM 03/10/2021, 03/31/2021    COVID-19 Pfizer vac (Milton-sucrose, gray cap) 12 yr+ IM 05/11/2022    Tdap 10/24/2022       Past Medical History:   Diagnosis Date    Alcohol abuse     Anxiety     Cancer (HCC)     kidney    Depression     GERD (gastroesophageal reflux disease)     Hypertension         Family History   Problem Relation Age of Onset    Colon cancer Neg Hx     Colon polyps Neg Hx      Past Surgical History:   Procedure Laterality Date    NEPHRECTOMY Left     UMBILICAL HERNIA REPAIR       Social History     Tobacco Use    Smoking status: Never    Smokeless tobacco: Never   Vaping Use    Vaping status: Never Used   Substance Use Topics    Alcohol use: Yes     Comment: a gallon of gin a week    Drug use: Never     E-Cigarette/Vaping    E-Cigarette Use Never User      E-Cigarette/Vaping Substances    Nicotine No     THC No     CBD No     Flavoring No     Other No     Unknown No        Family History: non-contributory    Meds/Allergies   Prior to Admission Medications   Prescriptions Last Dose Informant Patient Reported? Taking?   FLUoxetine (PROzac) 20 mg capsule   Yes No   Sig: Take 20 mg by mouth every morning   amLODIPine (NORVASC) 10 mg tablet  Self Yes No   docusate sodium (COLACE) 100 mg capsule  Self No No   Sig: Take 1 capsule (100 mg total) by mouth every 12 (twelve) hours   levETIRAcetam (KEPPRA) 500 mg tablet   Yes No   Sig: Take 500 mg by mouth 2 (two) times a day   lisinopril (ZESTRIL) 5 mg tablet   No No   Sig: Take 1 tablet (5 mg total) by mouth daily Do not start before October 27, 2022.   pantoprazole (PROTONIX) 40 mg tablet  Self Yes No      Facility-Administered Medications: None       No Known Allergies    PHYSICAL EXAM    PE limited by: none    Objective   Vitals:   First set: Temperature: 97.9 °F (36.6 °C) (06/16/24 1932)  Pulse: 82 (06/16/24 1930)  Respirations: 18 (06/16/24 1931)  Blood Pressure: 158/96 (06/16/24 1930)  SpO2: 96 % (06/16/24 1931)    Primary Survey:   (A) Airway: patent  (B) Breathing: bilateral breath sounds  (C) Circulation: Pulses:   normal  (D) Disabliity:  GCS Total:  14, Eye Opening:   Spontaneous = 4, Motor Response: Obeys commands = 6, and Verbal Response:  Confused = 4  (E) Expose:  Completed    Secondary Survey: (Click on Physical Exam tab  "above)  Physical Exam  Vitals and nursing note reviewed.   HENT:      Head: Normocephalic and atraumatic.   Cardiovascular:      Rate and Rhythm: Normal rate and regular rhythm.      Pulses: Normal pulses.      Heart sounds: Normal heart sounds.   Pulmonary:      Effort: Pulmonary effort is normal. No respiratory distress.      Breath sounds: Normal breath sounds.   Abdominal:      General: There is no distension.      Palpations: Abdomen is soft.      Tenderness: There is abdominal tenderness in the periumbilical area. There is no guarding or rebound.   Musculoskeletal:         General: No deformity.      Right lower leg: No edema.      Left lower leg: No edema.      Comments: No midline C/T/L spine tenderness. No step offs or deformities.        Skin:     General: Skin is warm and dry.   Neurological:      Mental Status: He is alert.      Comments: Intoxicated, confused         Cervical spine cleared by clinical criteria? No (imaging required)      Invasive Devices       Peripheral Intravenous Line  Duration             Peripheral IV 06/16/24 Right Antecubital <1 day                    Lab Results:   Results Reviewed       Procedure Component Value Units Date/Time    POCT alcohol breath test [[id number]]  (Normal) Resulted: 06/17/24 0036    Lab Status: Final result Updated: 06/17/24 0036     EXTBreath Alcohol 0.145    Levetiracetam level [[id number]]  (Abnormal) Collected: 06/16/24 2004    Lab Status: Final result Specimen: Blood from Arm, Right Updated: 06/16/24 2034     Levetiracetam Lvl <2.0 ug/mL     Narrative:      \"Brivaracetam (Briviact) interferes with measurements of levetiracetam in the ARK Levetiracetam Assay. Patients undergoing a switch in drug therapy involving Keppra and Briviact should not be monitored for levetiracetam using the ARK assay. Serum levels of levetiracetam and or brivaracetam should be confirmed by a valid chromatographic method if there is a possibility these drugs are co-present in " "circulation.\"    Comprehensive metabolic panel [[id number]]  (Abnormal) Collected: 06/16/24 1943    Lab Status: Final result Specimen: Blood from Arm, Right Updated: 06/16/24 2003     Sodium 134 mmol/L      Potassium 4.9 mmol/L      Chloride 101 mmol/L      CO2 23 mmol/L      ANION GAP 10 mmol/L      BUN 13 mg/dL      Creatinine 1.20 mg/dL      Glucose 113 mg/dL      Calcium 9.6 mg/dL      AST 47 U/L      ALT 39 U/L      Alkaline Phosphatase 71 U/L      Total Protein 8.2 g/dL      Albumin 4.9 g/dL      Total Bilirubin 0.91 mg/dL      eGFR 62 ml/min/1.73sq m     Narrative:      National Kidney Disease Foundation guidelines for Chronic Kidney Disease (CKD):     Stage 1 with normal or high GFR (GFR > 90 mL/min/1.73 square meters)    Stage 2 Mild CKD (GFR = 60-89 mL/min/1.73 square meters)    Stage 3A Moderate CKD (GFR = 45-59 mL/min/1.73 square meters)    Stage 3B Moderate CKD (GFR = 30-44 mL/min/1.73 square meters)    Stage 4 Severe CKD (GFR = 15-29 mL/min/1.73 square meters)    Stage 5 End Stage CKD (GFR <15 mL/min/1.73 square meters)  Note: GFR calculation is accurate only with a steady state creatinine    Magnesium [[id number]]  (Normal) Collected: 06/16/24 1943    Lab Status: Final result Specimen: Blood from Arm, Right Updated: 06/16/24 2003     Magnesium 2.1 mg/dL     Lipase [[id number]]  (Normal) Collected: 06/16/24 1943    Lab Status: Final result Specimen: Blood from Arm, Right Updated: 06/16/24 2003     Lipase 19 u/L     Ethanol [[id number]]  (Abnormal) Collected: 06/16/24 1943    Lab Status: Final result Specimen: Blood from Arm, Right Updated: 06/16/24 2002     Ethanol Lvl 336 mg/dL     CBC and differential [[id number]]  (Abnormal) Collected: 06/16/24 1943    Lab Status: Final result Specimen: Blood from Arm, Right Updated: 06/16/24 1949     WBC 7.08 Thousand/uL      RBC 4.37 Million/uL      Hemoglobin 13.9 g/dL      Hematocrit 41.6 %      MCV 95 fL      MCH 31.8 pg      MCHC 33.4 g/dL      RDW 14.1 %      MPV " 10.1 fL      Platelets 258 Thousands/uL      nRBC 0 /100 WBCs      Segmented % 38 %      Immature Grans % 0 %      Lymphocytes % 47 %      Monocytes % 8 %      Eosinophils Relative 6 %      Basophils Relative 1 %      Absolute Neutrophils 2.66 Thousands/µL      Absolute Immature Grans 0.03 Thousand/uL      Absolute Lymphocytes 3.38 Thousands/µL      Absolute Monocytes 0.55 Thousand/µL      Eosinophils Absolute 0.40 Thousand/µL      Basophils Absolute 0.06 Thousands/µL                    Imaging Studies:   Direct to CT: No  TRAUMA - CT head wo contrast   Final Result by Ilya Grijalva MD (06/16 2012)      No acute intracranial abnormality.      Similar mild chronic microangiopathy.      The study was marked in EPIC for immediate notification.                  Workstation performed: [id number]         TRAUMA - CT spine cervical wo contrast   Final Result by Ilya Grijalva MD (06/16 2015)      No cervical spine fracture or traumatic malalignment.      Additional chronic/incidental findings as detailed above.      The study was marked in EPIC for immediate notification.                        Workstation performed: [id number]         TRAUMA - CT chest abdomen pelvis w contrast   Final Result by Ilya Grijalva MD (06/16 2029)      No acute traumatic injury of the chest, abdomen, or pelvis.      No recurrent disease in left nephrectomy bed.      Hepatic steatosis.      Additional chronic/incidental findings as detailed above.      The study was marked in EPIC for immediate notification.                     Workstation performed: [id number]         XR Trauma chest portable   ED Interpretation by Lubna Bettencourt MD (06/16 1955)   No acute pulmonary pathology.  No displaced rib fractures.  No hemo/pneumothorax      Final Result by Ilya Grijalva MD (06/16 2006)      No acute cardiopulmonary disease. Please see same-day CT chest abdomen pelvis with contrast for further evaluation.       The study was marked in EPIC for immediate notification.            Workstation performed: [id number]         XR Trauma pelvis ap only 1 or 2 vw   ED Interpretation by Lubna Bettencourt MD (06/16 1955)   No acute fractures or dislocations      Final Result by Ilya Grijalva MD (06/16 2008)      No acute osseous abnormality. Please see same-day CT chest abdomen pelvis with contrast for further evaluation      The study was marked in EPIC for immediate notification..      Workstation performed: [id number]               Procedures  Procedures         ED Course  ED Course as of 06/17/24 0040   Sun Jun 16, 2024 2006 ETHANOL(!): 336   2134 Patient denies history of DTs or seizure in the setting of alcohol withdrawal.  Recently at [facility] for rehab, but left.  Interested in discussing other options.  [facility] consult placed.   [facility].  Patient unlikely to be able to be placed tonight and may have to wait until tomorrow.   2202 Per [facility] patient has a  currently.  Patient declined inpatient rehab when asked by [facility].           Medical Decision Making  68 year old male with history of alcohol abuse presents after fall.  No acute traumatic pathology identified on imaging.  [facility] consulted; however, patient declined inpatient rehab at this time.  Patient asymptomatic while in the ED.  Monitored until sobriety in the ED.      Amount and/or Complexity of Data Reviewed  Labs: ordered. Decision-making details documented in ED Course.  Radiology: ordered and independent interpretation performed.    Risk  Prescription drug management.                Disposition  [facility] Transfer: No  Final diagnoses:   Alcohol intoxication (HCC)   Alcohol abuse     Time reflects when diagnosis was documented in both MDM as applicable and the Disposition within this note       Time User Action Codes Description Comment    6/16/2024  8:19 PM Lubna Bettencourt  Add [F10.929] Alcohol intoxication (HCC)     6/16/2024  8:20 PM Lubna Bettencourt Add [F10.10] Alcohol abuse     6/17/2024 12:09 AM Lubna Bettencourt Modify [F10.929] Alcohol intoxication (HCC)     6/17/2024 12:09 AM Lubna Bettencourt Modify [F10.10] Alcohol abuse           ED Disposition       ED Disposition   Discharge    Condition   Stable    Date/Time   Mon Jun 17, 2024 12:38 AM    Comment   Marvin Christianson discharge to home/self care.                   Follow-up Information       Follow up With Specialties Details Why Contact Info Additional Information    Gigi Herndon MD Internal Medicine  As needed [address]  [address]  [facility] [address]  [phone]       [facility] MAIN SITE  Call  for rehab options in [address] [address]  [phone]      [facility] Emergency Department Emergency Medicine Go to  If symptoms worsen [address] [address] [phone] [facility] Emergency Department, [address], [place], [place] [address]          Patient's Medications   Discharge Prescriptions    No medications on file     No discharge procedures on file.    PDMP Review         Value Time User    PDMP Reviewed  Yes 10/25/2022  4:29 PM Brii Aguayo PA-C            ED Provider  Electronically Signed by           Lubna Bettencourt MD  06/17/24 0040

## 2024-06-17 VITALS
TEMPERATURE: 97.9 F | SYSTOLIC BLOOD PRESSURE: 154 MMHG | DIASTOLIC BLOOD PRESSURE: 87 MMHG | OXYGEN SATURATION: 95 % | RESPIRATION RATE: 18 BRPM | HEART RATE: 98 BPM

## 2024-06-17 LAB — ETHANOL EXG-MCNC: 0.14 MG/DL

## 2024-06-17 PROCEDURE — 82075 ASSAY OF BREATH ETHANOL: CPT | Performed by: EMERGENCY MEDICINE

## 2024-06-17 NOTE — ED NOTES
RN followed up with Larry and he stated that pt did not want to do inpatient. Lalitha stated that pt is on the waiting list for [facility] and will follow-up for outpatient treatment      Suzanne Blood RN  06/16/24 7091

## 2024-10-10 ENCOUNTER — TELEPHONE (OUTPATIENT)
Age: 69
End: 2024-10-10

## 2024-10-10 NOTE — TELEPHONE ENCOUNTER
Contacted patient off of [employer]  to verify needs of services in attempts to offer patient an appointment. Unable to LVM the  states the subscriber is not in service and no other phone number to reach patient. 1st attempt.

## 2024-10-16 ENCOUNTER — TELEPHONE (OUTPATIENT)
Age: 69
End: 2024-10-16

## 2024-10-16 NOTE — TELEPHONE ENCOUNTER
Contacted patient off of [employer]  to verify needs of services in attempts to offer patient an appointment. Unable to LVM the subscriber is not in service. 2nd attempt. Removing off wait list.

## 2024-11-05 ENCOUNTER — APPOINTMENT (EMERGENCY)
Dept: CT IMAGING | Facility: HOSPITAL | Age: 69
End: 2024-11-05
Payer: COMMERCIAL

## 2024-11-05 ENCOUNTER — HOSPITAL ENCOUNTER (EMERGENCY)
Facility: HOSPITAL | Age: 69
Discharge: HOME | End: 2024-11-06
Attending: EMERGENCY MEDICINE
Payer: COMMERCIAL

## 2024-11-05 ENCOUNTER — APPOINTMENT (EMERGENCY)
Dept: RADIOLOGY | Facility: HOSPITAL | Age: 69
End: 2024-11-05
Payer: COMMERCIAL

## 2024-11-05 DIAGNOSIS — F10.929 ACUTE ALCOHOL INTOXICATION (HCC): ICD-10-CM

## 2024-11-05 DIAGNOSIS — S09.90XA HEAD INJURY: Primary | ICD-10-CM

## 2024-11-05 LAB
2HR DELTA HS TROPONIN: -3 NG/L
ALBUMIN SERPL BCG-MCNC: 4.6 G/DL (ref 3.5–5)
ALP SERPL-CCNC: 58 U/L (ref 34–104)
ALT SERPL W P-5'-P-CCNC: 26 U/L (ref 7–52)
ANION GAP SERPL CALCULATED.3IONS-SCNC: 13 MMOL/L (ref 4–13)
AST SERPL W P-5'-P-CCNC: 32 U/L (ref 13–39)
BASOPHILS # BLD AUTO: 0.05 THOUSANDS/ÂΜL (ref 0–0.1)
BASOPHILS NFR BLD AUTO: 1 % (ref 0–1)
BILIRUB SERPL-MCNC: 1.18 MG/DL (ref 0.2–1)
BNP SERPL-MCNC: 79 PG/ML (ref 0–100)
BUN SERPL-MCNC: 13 MG/DL (ref 5–25)
CALCIUM SERPL-MCNC: 8.8 MG/DL (ref 8.4–10.2)
CARDIAC TROPONIN I PNL SERPL HS: 10 NG/L
CARDIAC TROPONIN I PNL SERPL HS: 7 NG/L
CHLORIDE SERPL-SCNC: 101 MMOL/L (ref 96–108)
CO2 SERPL-SCNC: 19 MMOL/L (ref 21–32)
CREAT SERPL-MCNC: 1.15 MG/DL (ref 0.6–1.3)
EOSINOPHIL # BLD AUTO: 0.31 THOUSAND/ÂΜL (ref 0–0.61)
EOSINOPHIL NFR BLD AUTO: 4 % (ref 0–6)
ERYTHROCYTE [DISTWIDTH] IN BLOOD BY AUTOMATED COUNT: 14.5 % (ref 11.6–15.1)
ETHANOL EXG-MCNC: 0.18 MG/DL
GFR SERPL CREATININE-BSD FRML MDRD: 65 ML/MIN/1.73SQ M
GLUCOSE SERPL-MCNC: 100 MG/DL (ref 65–140)
HCT VFR BLD AUTO: 37.4 % (ref 36.5–49.3)
HGB BLD-MCNC: 13.5 G/DL (ref 12–17)
IMM GRANULOCYTES # BLD AUTO: 0.04 THOUSAND/UL (ref 0–0.2)
IMM GRANULOCYTES NFR BLD AUTO: 1 % (ref 0–2)
LYMPHOCYTES # BLD AUTO: 3.2 THOUSANDS/ÂΜL (ref 0.6–4.47)
LYMPHOCYTES NFR BLD AUTO: 44 % (ref 14–44)
MCH RBC QN AUTO: 32.5 PG (ref 26.8–34.3)
MCHC RBC AUTO-ENTMCNC: 36.1 G/DL (ref 31.4–37.4)
MCV RBC AUTO: 90 FL (ref 82–98)
MONOCYTES # BLD AUTO: 0.65 THOUSAND/ÂΜL (ref 0.17–1.22)
MONOCYTES NFR BLD AUTO: 9 % (ref 4–12)
NEUTROPHILS # BLD AUTO: 2.98 THOUSANDS/ÂΜL (ref 1.85–7.62)
NEUTS SEG NFR BLD AUTO: 41 % (ref 43–75)
NRBC BLD AUTO-RTO: 0 /100 WBCS
PLATELET # BLD AUTO: 309 THOUSANDS/UL (ref 149–390)
PMV BLD AUTO: 10.3 FL (ref 8.9–12.7)
POTASSIUM SERPL-SCNC: 3.9 MMOL/L (ref 3.5–5.3)
PROT SERPL-MCNC: 7.7 G/DL (ref 6.4–8.4)
RBC # BLD AUTO: 4.15 MILLION/UL (ref 3.88–5.62)
SODIUM SERPL-SCNC: 133 MMOL/L (ref 135–147)
WBC # BLD AUTO: 7.23 THOUSAND/UL (ref 4.31–10.16)

## 2024-11-05 PROCEDURE — 36415 COLL VENOUS BLD VENIPUNCTURE: CPT

## 2024-11-05 PROCEDURE — 72125 CT NECK SPINE W/O DYE: CPT

## 2024-11-05 PROCEDURE — 84484 ASSAY OF TROPONIN QUANT: CPT

## 2024-11-05 PROCEDURE — 85025 COMPLETE CBC W/AUTO DIFF WBC: CPT

## 2024-11-05 PROCEDURE — 93005 ELECTROCARDIOGRAM TRACING: CPT

## 2024-11-05 PROCEDURE — 71045 X-RAY EXAM CHEST 1 VIEW: CPT

## 2024-11-05 PROCEDURE — 82075 ASSAY OF BREATH ETHANOL: CPT | Performed by: EMERGENCY MEDICINE

## 2024-11-05 PROCEDURE — 72170 X-RAY EXAM OF PELVIS: CPT

## 2024-11-05 PROCEDURE — 80053 COMPREHEN METABOLIC PANEL: CPT

## 2024-11-05 PROCEDURE — 99285 EMERGENCY DEPT VISIT HI MDM: CPT

## 2024-11-05 PROCEDURE — 83880 ASSAY OF NATRIURETIC PEPTIDE: CPT

## 2024-11-05 PROCEDURE — 70450 CT HEAD/BRAIN W/O DYE: CPT

## 2024-11-05 PROCEDURE — 99284 EMERGENCY DEPT VISIT MOD MDM: CPT | Performed by: EMERGENCY MEDICINE

## 2024-11-06 VITALS
DIASTOLIC BLOOD PRESSURE: 65 MMHG | OXYGEN SATURATION: 96 % | SYSTOLIC BLOOD PRESSURE: 131 MMHG | RESPIRATION RATE: 17 BRPM | HEART RATE: 81 BPM | TEMPERATURE: 98 F

## 2024-11-06 LAB
ATRIAL RATE: 84 BPM
ETHANOL EXG-MCNC: 0.1 MG/DL
P AXIS: 32 DEGREES
PR INTERVAL: 166 MS
QRS AXIS: -22 DEGREES
QRSD INTERVAL: 68 MS
QT INTERVAL: 378 MS
QTC INTERVAL: 446 MS
T WAVE AXIS: 39 DEGREES
VENTRICULAR RATE: 84 BPM

## 2024-11-06 PROCEDURE — 93010 ELECTROCARDIOGRAM REPORT: CPT | Performed by: INTERNAL MEDICINE

## 2024-11-06 PROCEDURE — 82075 ASSAY OF BREATH ETHANOL: CPT | Performed by: EMERGENCY MEDICINE

## 2024-11-06 NOTE — ED NOTES
Ride back to [facility] via [employer] requested     Jennifer Novak RN  11/06/24 0119

## 2024-11-06 NOTE — ED PROVIDER NOTES
Emergency Department Trauma Note  Marvin Christianson 69 y.o. male MRN: [id number]  Unit/Bed#: ED 01/ED 01 Encounter: [id number]      Trauma Alert: Trauma Acuity: Trauma Evaluation  Model of Arrival: Mode of Arrival: BLS via    Trauma Team: Current Providers  Attending Provider: Chelsea Mckeon MD  Registered Nurse: Lizeth Whyte, RN  Registered Nurse: Jennifer Novak, RN  Consultants: { IP Trauma Consultants:[id number]}      History of Present Illness     Chief Complaint:   Chief Complaint   Patient presents with    Fall     Pt fell backwards from standing     HPI:  Marvin Christianson is a 69 y.o. male who presents with ***.  Mechanism:Details of Incident: fall from standing Injury Date: 11/05/24   Injury Occurence Location - Specify County: [place]    HPI  Review of Systems    Historical Information     Immunizations:   Immunization History   Administered Date(s) Administered    COVID-19 Moderna mRNA Vaccine 12 Yr+ 50 mcg/0.5 mL (Spikevax) 10/30/2023    COVID-19 PFIZER VACCINE 0.3 ML IM 03/10/2021, 03/31/2021    COVID-19 Pfizer vac (Milton-sucrose, gray cap) 12 yr+ IM 05/11/2022    Tdap 10/24/2022       Past Medical History:   Diagnosis Date    Alcohol abuse     Anxiety     Cancer (HCC)     kidney    Depression     GERD (gastroesophageal reflux disease)     Hypertension        Family History   Problem Relation Age of Onset    Colon cancer Neg Hx     Colon polyps Neg Hx      Past Surgical History:   Procedure Laterality Date    NEPHRECTOMY Left     UMBILICAL HERNIA REPAIR       Social History     Tobacco Use    Smoking status: Never    Smokeless tobacco: Never   Vaping Use    Vaping status: Never Used   Substance Use Topics    Alcohol use: Yes     Comment: a gallon of gin a week    Drug use: Never     E-Cigarette/Vaping    E-Cigarette Use Never User      E-Cigarette/Vaping Substances    Nicotine No     THC No     CBD No     Flavoring No     Other No     Unknown No        Family History: { IP Trauma Family  "History:70131::\"non-contributory\"}    Meds/Allergies   Prior to Admission Medications   Prescriptions Last Dose Informant Patient Reported? Taking?   FLUoxetine (PROzac) 20 mg capsule   Yes No   Sig: Take 20 mg by mouth every morning   amLODIPine (NORVASC) 10 mg tablet  Self Yes No   docusate sodium (COLACE) 100 mg capsule  Self No No   Sig: Take 1 capsule (100 mg total) by mouth every 12 (twelve) hours   levETIRAcetam (KEPPRA) 500 mg tablet   Yes No   Sig: Take 500 mg by mouth 2 (two) times a day   lisinopril (ZESTRIL) 5 mg tablet   No No   Sig: Take 1 tablet (5 mg total) by mouth daily Do not start before October 27, 2022.   pantoprazole (PROTONIX) 40 mg tablet  Self Yes No      Facility-Administered Medications: None       No Known Allergies    PHYSICAL EXAM    PE limited by: ***    Objective   Vitals:   First set: Temperature: 98 °F (36.7 °C) (11/05/24 1735)  Pulse: 85 (11/05/24 1735)  Respirations: 18 (11/05/24 1735)  Blood Pressure: 165/88 (11/05/24 1735)  SpO2: 95 % (11/05/24 1735)    Primary Survey:   (A) Airway: ***  (B) Breathing: ***  (C) Circulation: Pulses:   {Pulses:43005}  (D) Disabliity:  {GCS response:59347}  (E) Expose:  {Completed:50834}    Secondary Survey: (Click on Physical Exam tab above)  Physical Exam    Cervical spine cleared by clinical criteria? {YES/NO:75743}     Invasive Devices       Peripheral Intravenous Line  Duration             Peripheral IV 11/05/24 Left;Ventral (anterior) Forearm <1 day                    Lab Results:   Results Reviewed       Procedure Component Value Units Date/Time    POCT alcohol breath test [[id number]]  (Abnormal) Resulted: 11/05/24 2100    Lab Status: Final result Updated: 11/05/24 2100     EXTBreath Alcohol 0.183    HS Troponin I 2hr [[id number]]  (Normal) Collected: 11/05/24 2018    Lab Status: Final result Specimen: Blood from Arm, Left Updated: 11/05/24 2044     hs TnI 2hr 7 ng/L      Delta 2hr hsTnI -3 ng/L     HS Troponin I 4hr [[id number]]     Lab " Status: No result Specimen: Blood     B-Type Natriuretic Peptide(BNP) [[id number]]  (Normal) Collected: 11/05/24 1757    Lab Status: Final result Specimen: Blood from Arm, Left Updated: 11/05/24 1837     BNP 79 pg/mL     Comprehensive metabolic panel [[id number]]  (Abnormal) Collected: 11/05/24 1757    Lab Status: Final result Specimen: Blood from Arm, Left Updated: 11/05/24 1835     Sodium 133 mmol/L      Potassium 3.9 mmol/L      Chloride 101 mmol/L      CO2 19 mmol/L      ANION GAP 13 mmol/L      BUN 13 mg/dL      Creatinine 1.15 mg/dL      Glucose 100 mg/dL      Calcium 8.8 mg/dL      AST 32 U/L      ALT 26 U/L      Alkaline Phosphatase 58 U/L      Total Protein 7.7 g/dL      Albumin 4.6 g/dL      Total Bilirubin 1.18 mg/dL      eGFR 65 ml/min/1.73sq m     Narrative:      National Kidney Disease Foundation guidelines for Chronic Kidney Disease (CKD):     Stage 1 with normal or high GFR (GFR > 90 mL/min/1.73 square meters)    Stage 2 Mild CKD (GFR = 60-89 mL/min/1.73 square meters)    Stage 3A Moderate CKD (GFR = 45-59 mL/min/1.73 square meters)    Stage 3B Moderate CKD (GFR = 30-44 mL/min/1.73 square meters)    Stage 4 Severe CKD (GFR = 15-29 mL/min/1.73 square meters)    Stage 5 End Stage CKD (GFR <15 mL/min/1.73 square meters)  Note: GFR calculation is accurate only with a steady state creatinine    HS Troponin 0hr (reflex protocol) [[id number]]  (Normal) Collected: 11/05/24 1757    Lab Status: Final result Specimen: Blood from Arm, Left Updated: 11/05/24 1826     hs TnI 0hr 10 ng/L     CBC and differential [[id number]]  (Abnormal) Collected: 11/05/24 1757    Lab Status: Final result Specimen: Blood from Arm, Left Updated: 11/05/24 1803     WBC 7.23 Thousand/uL      RBC 4.15 Million/uL      Hemoglobin 13.5 g/dL      Hematocrit 37.4 %      MCV 90 fL      MCH 32.5 pg      MCHC 36.1 g/dL      RDW 14.5 %      MPV 10.3 fL      Platelets 309 Thousands/uL      nRBC 0 /100 WBCs      Segmented % 41 %      Immature Grans %  1 %      Lymphocytes % 44 %      Monocytes % 9 %      Eosinophils Relative 4 %      Basophils Relative 1 %      Absolute Neutrophils 2.98 Thousands/µL      Absolute Immature Grans 0.04 Thousand/uL      Absolute Lymphocytes 3.20 Thousands/µL      Absolute Monocytes 0.65 Thousand/µL      Eosinophils Absolute 0.31 Thousand/µL      Basophils Absolute 0.05 Thousands/µL                    Imaging Studies:   Direct to CT: {YES/NO:59840}  TRAUMA - CT head wo contrast   Final Result by E. Alec Schoenberger, MD (11/05 1859)      No acute intracranial abnormality.                  Workstation performed: [id number]         TRAUMA - CT spine cervical wo contrast   Final Result by E. Alec Schoenberger, MD (11/05 1855)      No cervical spine fracture or traumatic malalignment.                  Workstation performed: [id number]         XR Trauma chest portable   Final Result by E. Alec Schoenberger, MD (11/05 1857)      Left basilar atelectasis            Workstation performed: [id number]         XR Trauma pelvis ap only 1 or 2 vw   Final Result by E. Alec Schoenberger, MD (11/05 1859)      No acute osseous abnormality.         Computerized Assisted Algorithm (CAA) may have been used to analyze all applicable images.         Workstation performed: [id number]               Procedures  Procedures         ED Course           Medical Decision Making  Amount and/or Complexity of Data Reviewed  Labs: ordered.  Radiology: ordered.                Disposition  Priority One Transfer: {YES/NO:19935}  Final diagnoses:   None     ED Disposition       None          Follow-up Information    None       Patient's Medications   Discharge Prescriptions    No medications on file     No discharge procedures on file.    PDMP Review         Value Time User    PDMP Reviewed  Yes 10/25/2022  4:29 PM Brii Aguayo PA-C            ED Provider  Electronically Signed by         g/dL      Hematocrit 37.4 %      MCV 90 fL      MCH 32.5 pg      MCHC 36.1 g/dL      RDW 14.5 %      MPV 10.3 fL      Platelets 309 Thousands/uL      nRBC 0 /100 WBCs      Segmented % 41 %      Immature Grans % 1 %      Lymphocytes % 44 %      Monocytes % 9 %      Eosinophils Relative 4 %      Basophils Relative 1 %      Absolute Neutrophils 2.98 Thousands/µL      Absolute Immature Grans 0.04 Thousand/uL      Absolute Lymphocytes 3.20 Thousands/µL      Absolute Monocytes 0.65 Thousand/µL      Eosinophils Absolute 0.31 Thousand/µL      Basophils Absolute 0.05 Thousands/µL                    Imaging Studies:   Direct to CT: Yes  TRAUMA - CT head wo contrast   Final Result by E. Alec Schoenberger, MD (11/05 1859)      No acute intracranial abnormality.                  Workstation performed: [id number]         TRAUMA - CT spine cervical wo contrast   Final Result by E. Alec Schoenberger, MD (11/05 1855)      No cervical spine fracture or traumatic malalignment.                  Workstation performed: [id number]         XR Trauma chest portable   Final Result by E. Alec Schoenberger, MD (11/05 1857)      Left basilar atelectasis            Workstation performed: [id number]         XR Trauma pelvis ap only 1 or 2 vw   Final Result by E. Alec Schoenberger, MD (11/05 1859)      No acute osseous abnormality.         Computerized Assisted Algorithm (CAA) may have been used to analyze all applicable images.         Workstation performed: [id number]               Procedures  Procedures         ED Course           Medical Decision Making  Patient with fall from standing. + ETOH. Struck head. No other signs of trauma. CT head negative for ICH. C-spine nontender and negative CT cspine. CXR and xray pelvis negative. Patient was monitored in ED until no longer intoxicated and then discharged home.     Amount and/or Complexity of Data Reviewed  Independent Historian: EMS     Details: Hx per EMS as patient is intoxicated.   Labs:  ordered.     Details: Initial ETOH 180s repeat 99.  Radiology: ordered.                Disposition  Priority One Transfer: No  Final diagnoses:   Head injury   Acute alcohol intoxication (HCC)     Time reflects when diagnosis was documented in both MDM as applicable and the Disposition within this note       Time User Action Codes Description Comment    11/6/2024 12:59 AM Chelsea Mckeon [S09.90XA] Head injury     11/6/2024 12:59 AM Chelsea Mckeon [F10.929] Acute alcohol intoxication (HCC)           ED Disposition       ED Disposition   Discharge    Condition   Stable    Date/Time   Wed Nov 6, 2024 12:59 AM    Comment   Marvin Christianson discharge to home/self care.                   Follow-up Information       Follow up With Specialties Details Why Contact Info    Gigi Herndon MD Internal Medicine In 1 week  [address] [place] [address].  [address]  [facility] [address]  [phone]            Discharge Medication List as of 11/6/2024  1:01 AM        CONTINUE these medications which have NOT CHANGED    Details   amLODIPine (NORVASC) 10 mg tablet Historical Med      docusate sodium (COLACE) 100 mg capsule Take 1 capsule (100 mg total) by mouth every 12 (twelve) hours, Starting Fri 10/29/2021, Normal      FLUoxetine (PROzac) 20 mg capsule Take 20 mg by mouth every morning, Starting Wed 6/15/2022, Historical Med      levETIRAcetam (KEPPRA) 500 mg tablet Take 500 mg by mouth 2 (two) times a day, Starting Mon 7/11/2022, Historical Med      lisinopril (ZESTRIL) 5 mg tablet Take 1 tablet (5 mg total) by mouth daily Do not start before October 27, 2022., Starting Thu 10/27/2022, Normal      pantoprazole (PROTONIX) 40 mg tablet Historical Med           No discharge procedures on file.    PDMP Review         Value Time User    PDMP Reviewed  Yes 10/25/2022  4:29 PM Brii Aguayo PA-C            ED Provider  Electronically Signed by           Chelsea Mckeon MD  11/18/24 2493

## 2024-12-07 ENCOUNTER — APPOINTMENT (EMERGENCY)
Dept: RADIOLOGY | Facility: HOSPITAL | Age: 69
End: 2024-12-07
Payer: COMMERCIAL

## 2024-12-07 ENCOUNTER — APPOINTMENT (EMERGENCY)
Dept: CT IMAGING | Facility: HOSPITAL | Age: 69
End: 2024-12-07
Payer: COMMERCIAL

## 2024-12-07 ENCOUNTER — HOSPITAL ENCOUNTER (EMERGENCY)
Facility: HOSPITAL | Age: 69
Discharge: HOME | End: 2024-12-07
Attending: EMERGENCY MEDICINE | Admitting: EMERGENCY MEDICINE
Payer: COMMERCIAL

## 2024-12-07 VITALS
HEART RATE: 90 BPM | SYSTOLIC BLOOD PRESSURE: 164 MMHG | TEMPERATURE: 98.9 F | DIASTOLIC BLOOD PRESSURE: 81 MMHG | BODY MASS INDEX: 28.69 KG/M2 | OXYGEN SATURATION: 93 % | RESPIRATION RATE: 18 BRPM | WEIGHT: 188.71 LBS

## 2024-12-07 DIAGNOSIS — S20.219A CHEST WALL CONTUSION: Primary | ICD-10-CM

## 2024-12-07 DIAGNOSIS — F10.929 ALCOHOL INTOXICATION (HCC): ICD-10-CM

## 2024-12-07 LAB
ABO GROUP BLD: NORMAL
ANION GAP SERPL CALCULATED.3IONS-SCNC: 6 MMOL/L (ref 4–13)
ATRIAL RATE: 82 BPM
BASOPHILS # BLD AUTO: 0.05 THOUSANDS/ÂΜL (ref 0–0.1)
BASOPHILS NFR BLD AUTO: 1 % (ref 0–1)
BLD GP AB SCN SERPL QL: NEGATIVE
BUN SERPL-MCNC: 11 MG/DL (ref 5–25)
CALCIUM SERPL-MCNC: 9.4 MG/DL (ref 8.4–10.2)
CHLORIDE SERPL-SCNC: 107 MMOL/L (ref 96–108)
CO2 SERPL-SCNC: 26 MMOL/L (ref 21–32)
CREAT SERPL-MCNC: 0.92 MG/DL (ref 0.6–1.3)
EOSINOPHIL # BLD AUTO: 0.36 THOUSAND/ÂΜL (ref 0–0.61)
EOSINOPHIL NFR BLD AUTO: 6 % (ref 0–6)
ERYTHROCYTE [DISTWIDTH] IN BLOOD BY AUTOMATED COUNT: 15 % (ref 11.6–15.1)
ETHANOL SERPL-MCNC: 316 MG/DL
GFR SERPL CREATININE-BSD FRML MDRD: 85 ML/MIN/1.73SQ M
GLUCOSE SERPL-MCNC: 107 MG/DL (ref 65–140)
HCT VFR BLD AUTO: 36.7 % (ref 36.5–49.3)
HGB BLD-MCNC: 12.4 G/DL (ref 12–17)
IMM GRANULOCYTES # BLD AUTO: 0.03 THOUSAND/UL (ref 0–0.2)
IMM GRANULOCYTES NFR BLD AUTO: 1 % (ref 0–2)
LYMPHOCYTES # BLD AUTO: 2.55 THOUSANDS/ÂΜL (ref 0.6–4.47)
LYMPHOCYTES NFR BLD AUTO: 45 % (ref 14–44)
MCH RBC QN AUTO: 31.9 PG (ref 26.8–34.3)
MCHC RBC AUTO-ENTMCNC: 33.8 G/DL (ref 31.4–37.4)
MCV RBC AUTO: 94 FL (ref 82–98)
MONOCYTES # BLD AUTO: 0.57 THOUSAND/ÂΜL (ref 0.17–1.22)
MONOCYTES NFR BLD AUTO: 10 % (ref 4–12)
NEUTROPHILS # BLD AUTO: 2.08 THOUSANDS/ÂΜL (ref 1.85–7.62)
NEUTS SEG NFR BLD AUTO: 37 % (ref 43–75)
NRBC BLD AUTO-RTO: 0 /100 WBCS
P AXIS: 61 DEGREES
PLATELET # BLD AUTO: 230 THOUSANDS/UL (ref 149–390)
PMV BLD AUTO: 9.9 FL (ref 8.9–12.7)
POTASSIUM SERPL-SCNC: 4.1 MMOL/L (ref 3.5–5.3)
PR INTERVAL: 176 MS
QRS AXIS: -27 DEGREES
QRSD INTERVAL: 68 MS
QT INTERVAL: 368 MS
QTC INTERVAL: 429 MS
RBC # BLD AUTO: 3.89 MILLION/UL (ref 3.88–5.62)
RH BLD: POSITIVE
SODIUM SERPL-SCNC: 139 MMOL/L (ref 135–147)
SPECIMEN EXPIRATION DATE: NORMAL
T WAVE AXIS: 44 DEGREES
VENTRICULAR RATE: 82 BPM
WBC # BLD AUTO: 5.64 THOUSAND/UL (ref 4.31–10.16)

## 2024-12-07 PROCEDURE — 71260 CT THORAX DX C+: CPT

## 2024-12-07 PROCEDURE — 80048 BASIC METABOLIC PNL TOTAL CA: CPT | Performed by: EMERGENCY MEDICINE

## 2024-12-07 PROCEDURE — 86850 RBC ANTIBODY SCREEN: CPT | Performed by: EMERGENCY MEDICINE

## 2024-12-07 PROCEDURE — 72125 CT NECK SPINE W/O DYE: CPT

## 2024-12-07 PROCEDURE — 86901 BLOOD TYPING SEROLOGIC RH(D): CPT | Performed by: EMERGENCY MEDICINE

## 2024-12-07 PROCEDURE — 99285 EMERGENCY DEPT VISIT HI MDM: CPT | Performed by: EMERGENCY MEDICINE

## 2024-12-07 PROCEDURE — 70450 CT HEAD/BRAIN W/O DYE: CPT

## 2024-12-07 PROCEDURE — 72170 X-RAY EXAM OF PELVIS: CPT

## 2024-12-07 PROCEDURE — 85025 COMPLETE CBC W/AUTO DIFF WBC: CPT | Performed by: EMERGENCY MEDICINE

## 2024-12-07 PROCEDURE — 86900 BLOOD TYPING SEROLOGIC ABO: CPT | Performed by: EMERGENCY MEDICINE

## 2024-12-07 PROCEDURE — 82077 ASSAY SPEC XCP UR&BREATH IA: CPT | Performed by: EMERGENCY MEDICINE

## 2024-12-07 PROCEDURE — 36415 COLL VENOUS BLD VENIPUNCTURE: CPT | Performed by: EMERGENCY MEDICINE

## 2024-12-07 PROCEDURE — 93005 ELECTROCARDIOGRAM TRACING: CPT

## 2024-12-07 PROCEDURE — 99285 EMERGENCY DEPT VISIT HI MDM: CPT

## 2024-12-07 PROCEDURE — 93010 ELECTROCARDIOGRAM REPORT: CPT | Performed by: INTERNAL MEDICINE

## 2024-12-07 PROCEDURE — 74177 CT ABD & PELVIS W/CONTRAST: CPT

## 2024-12-07 PROCEDURE — 71045 X-RAY EXAM CHEST 1 VIEW: CPT

## 2024-12-07 RX ADMIN — IOHEXOL 100 ML: 350 INJECTION, SOLUTION INTRAVENOUS at 13:20

## 2024-12-07 NOTE — ED PROVIDER NOTES
"Emergency Department Trauma Note  Marvin Christianson 69 y.o. male MRN: [id number]  Unit/Bed#: ED TR13/TR13A Encounter: [id number]      Trauma Alert: Trauma Acuity: Trauma Evaluation  Model of Arrival: Mode of Arrival: BLS via Trauma Squad Name and Number: [id number]  Trauma Team: Current Providers  Attending Provider: Marvin Bone DO  Registered Nurse: Ester Watson RN  Consultants:     None      History of Present Illness     Chief Complaint:   Chief Complaint   Patient presents with    Fall     Pt to ED following fall. Fell last night and has pain to R side. Daily drinker, had \"a bunch of drinks today, couldn't tell you how many\"     HPI:  Marvin Christianson is a 69 y.o. male who presents with right rib pain after fall trauma evaluation called.  Mechanism:Details of Incident: pt fell last night after drinking all day. states he also fell this morning and is c/o pain to R rib cage. fell onto floor Injury Date: 12/07/24        This is a 69-year-old male who is a poor historian does admit to drinking daily.  States that he fell yesterday and today complains of right rib pain trauma evaluation was called.  Patient has been through alcohol rehab in the past and does not wish to speak with anybody      History provided by:  Patient  Medical Problem  Location:  Right rib  Quality:  Contusion  Severity:  Moderate  Onset quality:  Sudden  Duration:  1 day  Timing:  Intermittent  Progression:  Waxing and waning  Chronicity:  Recurrent  Context:  Right rib pain after fall  Associated symptoms: no abdominal pain      Review of Systems   Unable to perform ROS: Mental status change   Gastrointestinal:  Negative for abdominal pain.   Musculoskeletal:         Right rib pain   All other systems reviewed and are negative.      Historical Information     Immunizations:   Immunization History   Administered Date(s) Administered    COVID-19 Moderna mRNA Vaccine 12 Yr+ 50 mcg/0.5 mL (Spikevax) 10/30/2023    COVID-19 PFIZER VACCINE 0.3 " ML IM 03/10/2021, 03/31/2021    COVID-19 Pfizer vac (Milton-sucrose, gray cap) 12 yr+ IM 05/11/2022    Tdap 10/24/2022       Past Medical History:   Diagnosis Date    Alcohol abuse     Anxiety     Cancer (HCC)     kidney    Depression     GERD (gastroesophageal reflux disease)     Hypertension        Family History   Problem Relation Age of Onset    Colon cancer Neg Hx     Colon polyps Neg Hx      Past Surgical History:   Procedure Laterality Date    NEPHRECTOMY Left     UMBILICAL HERNIA REPAIR       Social History     Tobacco Use    Smoking status: Never    Smokeless tobacco: Never   Vaping Use    Vaping status: Never Used   Substance Use Topics    Alcohol use: Yes     Comment: a gallon of gin a week    Drug use: Never     E-Cigarette/Vaping    E-Cigarette Use Never User      E-Cigarette/Vaping Substances    Nicotine No     THC No     CBD No     Flavoring No     Other No     Unknown No        Family History: non-contributory    Meds/Allergies   Prior to Admission Medications   Prescriptions Last Dose Informant Patient Reported? Taking?   FLUoxetine (PROzac) 20 mg capsule   Yes No   Sig: Take 20 mg by mouth every morning   amLODIPine (NORVASC) 10 mg tablet  Self Yes No   docusate sodium (COLACE) 100 mg capsule  Self No No   Sig: Take 1 capsule (100 mg total) by mouth every 12 (twelve) hours   levETIRAcetam (KEPPRA) 500 mg tablet   Yes No   Sig: Take 500 mg by mouth 2 (two) times a day   lisinopril (ZESTRIL) 5 mg tablet   No No   Sig: Take 1 tablet (5 mg total) by mouth daily Do not start before October 27, 2022.   pantoprazole (PROTONIX) 40 mg tablet  Self Yes No      Facility-Administered Medications: None       No Known Allergies    PHYSICAL EXAM    PE limited by: Alcohol use    Objective   Vitals:   First set: Temperature: 98.9 °F (37.2 °C) (12/07/24 1247)  Pulse: 78 (12/07/24 1247)  Respirations: 18 (12/07/24 1247)  Blood Pressure: (!) 183/94 (12/07/24 1247)  SpO2: 97 % (12/07/24 1247)    Primary Survey:   (A)  Airway: Patent  (B) Breathing: Clear bilateral  (C) Circulation: Pulses:   normal  (D) Disabliity:  GCS Total:  15  (E) Expose:  Completed    Secondary Survey: (Click on Physical Exam tab above)  Physical Exam  Vitals and nursing note reviewed.   Constitutional:       Comments: Awake and alert but poor historian   HENT:      Head: Normocephalic and atraumatic.      Right Ear: Tympanic membrane, ear canal and external ear normal.      Left Ear: Tympanic membrane, ear canal and external ear normal.   Eyes:      General:         Right eye: No discharge.         Left eye: No discharge.      Extraocular Movements: Extraocular movements intact.      Comments: Pupils 3 mm bilateral and sluggish   Cardiovascular:      Rate and Rhythm: Normal rate and regular rhythm.      Pulses: Normal pulses.      Heart sounds: No murmur heard.     No friction rub. No gallop.   Pulmonary:      Effort: Pulmonary effort is normal. No respiratory distress.      Breath sounds: No stridor. No wheezing, rhonchi or rales.      Comments: Right lateral rib tenderness  Chest:      Chest wall: Tenderness present.   Abdominal:      General: There is no distension.      Palpations: Abdomen is soft.      Tenderness: There is no abdominal tenderness. There is no guarding or rebound.   Musculoskeletal:         General: No swelling, tenderness, deformity or signs of injury.      Cervical back: Neck supple. No rigidity or tenderness.      Right lower leg: No edema.      Left lower leg: No edema.   Skin:     General: Skin is warm and dry.      Findings: No erythema or rash.   Neurological:      Mental Status: He is oriented to person, place, and time.      Cranial Nerves: No cranial nerve deficit.      Sensory: No sensory deficit.   Psychiatric:         Behavior: Behavior normal.         Thought Content: Thought content normal.         Cervical spine cleared by clinical criteria? No (imaging required)      Invasive Devices       Peripheral Intravenous Line   Duration             Peripheral IV 12/07/24 Right Antecubital <1 day                    Lab Results:   Results Reviewed       Procedure Component Value Units Date/Time    Basic metabolic panel [[id number]] Collected: 12/07/24 1308    Lab Status: Final result Specimen: Blood from Arm, Right Updated: 12/07/24 1336     Sodium 139 mmol/L      Potassium 4.1 mmol/L      Chloride 107 mmol/L      CO2 26 mmol/L      ANION GAP 6 mmol/L      BUN 11 mg/dL      Creatinine 0.92 mg/dL      Glucose 107 mg/dL      Calcium 9.4 mg/dL      eGFR 85 ml/min/1.73sq m     Narrative:      National Kidney Disease Foundation guidelines for Chronic Kidney Disease (CKD):     Stage 1 with normal or high GFR (GFR > 90 mL/min/1.73 square meters)    Stage 2 Mild CKD (GFR = 60-89 mL/min/1.73 square meters)    Stage 3A Moderate CKD (GFR = 45-59 mL/min/1.73 square meters)    Stage 3B Moderate CKD (GFR = 30-44 mL/min/1.73 square meters)    Stage 4 Severe CKD (GFR = 15-29 mL/min/1.73 square meters)    Stage 5 End Stage CKD (GFR <15 mL/min/1.73 square meters)  Note: GFR calculation is accurate only with a steady state creatinine    Ethanol [[id number]]  (Abnormal) Collected: 12/07/24 1308    Lab Status: Final result Specimen: Blood from Arm, Right Updated: 12/07/24 1336     Ethanol Lvl 316 mg/dL     CBC and differential [[id number]]  (Abnormal) Collected: 12/07/24 1308    Lab Status: Final result Specimen: Blood from Arm, Right Updated: 12/07/24 1321     WBC 5.64 Thousand/uL      RBC 3.89 Million/uL      Hemoglobin 12.4 g/dL      Hematocrit 36.7 %      MCV 94 fL      MCH 31.9 pg      MCHC 33.8 g/dL      RDW 15.0 %      MPV 9.9 fL      Platelets 230 Thousands/uL      nRBC 0 /100 WBCs      Segmented % 37 %      Immature Grans % 1 %      Lymphocytes % 45 %      Monocytes % 10 %      Eosinophils Relative 6 %      Basophils Relative 1 %      Absolute Neutrophils 2.08 Thousands/µL      Absolute Immature Grans 0.03 Thousand/uL      Absolute Lymphocytes 2.55  Thousands/µL      Absolute Monocytes 0.57 Thousand/µL      Eosinophils Absolute 0.36 Thousand/µL      Basophils Absolute 0.05 Thousands/µL                    Imaging Studies:   Direct to CT: Yes  TRAUMA - CT head wo contrast   Final Result by Ilya Grijalva MD (12/07 1345)      No acute intracranial abnormality.      Similar mild chronic microangiopathy.      The study was marked in EPIC for immediate notification.                  Workstation performed: [id number]         TRAUMA - CT spine cervical wo contrast   Final Result by Ilya Grijalva MD (12/07 1351)      No acute cervical spine fracture or traumatic malalignment.      Additional chronic/incidental findings as detailed above.      Please see same day CT chest abdomen pelvis with contrast for further evaluation.      The study was marked in EPIC for immediate notification.                        Workstation performed: [id number]         TRAUMA - CT chest abdomen pelvis w contrast   Final Result by Rachel Rice MD (12/07 1404)         1. No acute posttraumatic injury visualized.   2. Stable chronic incidental findings described in the body of the report.               Workstation performed: [id number]         XR Trauma chest portable   Final Result by Archana Nugent MD (12/07 1312)      Mild left basilar atelectasis.            Workstation performed: [id number]         XR Trauma pelvis ap only 1 or 2 vw   Final Result by Archana Nugent MD (12/07 1312)      No acute osseous abnormality.         Computerized Assisted Algorithm (CAA) may have been used to analyze all applicable images.         Workstation performed: [id number]               Procedures  ECG 12 Lead Documentation Only    Date/Time: 12/7/2024 12:52 PM    Performed by: Marvin Bone DO  Authorized by: Marvin Bone DO    ECG reviewed by me, the ED Provider: yes    Patient location:  ED  Rate:     ECG rate:  82  Rhythm:     Rhythm: sinus rhythm    Conduction:      Conduction: normal    T waves:     T waves: normal             ED Course  ED Course as of 12/07/24 1417   Sat Dec 07, 2024   1414 Patient is awake alert [place] x 3 will ambulate prior to discharge           Medical Decision Making  Fall with right rib pain alcohol abuse trauma evaluation:    Amount and/or Complexity of Data Reviewed  Labs: ordered.  Radiology: ordered.                Disposition  Priority One Transfer: No  Final diagnoses:   Chest wall contusion   Alcohol intoxication (HCC)     Time reflects when diagnosis was documented in both MDM as applicable and the Disposition within this note       Time User Action Codes Description Comment    12/7/2024  2:09 PM Marvin Bone [S20.219A] Chest wall contusion     12/7/2024  2:09 PM Marvin Bone [F10.929] Alcohol intoxication (HCC)           ED Disposition       ED Disposition   Discharge    Condition   Stable    Date/Time   Sat Dec 7, 2024  2:15 PM    Comment   Marvin Christianson discharge to home/self care.                   Follow-up Information    None       Patient's Medications   Discharge Prescriptions    No medications on file     No discharge procedures on file.    PDMP Review         Value Time User    PDMP Reviewed  Yes 10/25/2022  4:29 PM Brii Aguayo PA-C            ED Provider  Electronically Signed by           Marvin Bone,   12/07/24 1416       Marvin Bone,   12/07/24 1417

## 2025-01-31 ENCOUNTER — HOSPITAL ENCOUNTER (EMERGENCY)
Facility: HOSPITAL | Age: 70
Discharge: HOME | End: 2025-01-31
Attending: EMERGENCY MEDICINE
Payer: COMMERCIAL

## 2025-01-31 ENCOUNTER — APPOINTMENT (EMERGENCY)
Dept: CT IMAGING | Facility: HOSPITAL | Age: 70
End: 2025-01-31
Payer: COMMERCIAL

## 2025-01-31 ENCOUNTER — APPOINTMENT (EMERGENCY)
Dept: RADIOLOGY | Facility: HOSPITAL | Age: 70
End: 2025-01-31
Payer: COMMERCIAL

## 2025-01-31 ENCOUNTER — TELEPHONE (OUTPATIENT)
Age: 70
End: 2025-01-31

## 2025-01-31 VITALS
OXYGEN SATURATION: 94 % | HEART RATE: 112 BPM | WEIGHT: 194.45 LBS | DIASTOLIC BLOOD PRESSURE: 103 MMHG | SYSTOLIC BLOOD PRESSURE: 135 MMHG | BODY MASS INDEX: 29.57 KG/M2 | RESPIRATION RATE: 20 BRPM | TEMPERATURE: 98.1 F
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
## 2025-01-31 DIAGNOSIS — F10.929 ALCOHOL INTOXICATION (HCC): ICD-10-CM

## 2025-01-31 LAB
ALBUMIN SERPL BCG-MCNC: 4.7 G/DL (ref 3.5–5)
ALP SERPL-CCNC: 58 U/L (ref 34–104)
ALT SERPL W P-5'-P-CCNC: 17 U/L (ref 7–52)
ANION GAP SERPL CALCULATED.3IONS-SCNC: 9 MMOL/L (ref 4–13)
AST SERPL W P-5'-P-CCNC: 22 U/L (ref 13–39)
BASOPHILS # BLD AUTO: 0.03 THOUSANDS/ΜL (ref 0–0.1)
BASOPHILS NFR BLD AUTO: 0 % (ref 0–1)
BILIRUB SERPL-MCNC: 0.72 MG/DL (ref 0.2–1)
BUN SERPL-MCNC: 19 MG/DL (ref 5–25)
CALCIUM SERPL-MCNC: 9.5 MG/DL (ref 8.4–10.2)
CHLORIDE SERPL-SCNC: 104 MMOL/L (ref 96–108)
CO2 SERPL-SCNC: 23 MMOL/L (ref 21–32)
CREAT SERPL-MCNC: 1.21 MG/DL (ref 0.6–1.3)
EOSINOPHIL # BLD AUTO: 0.43 THOUSAND/ΜL (ref 0–0.61)
EOSINOPHIL NFR BLD AUTO: 5 % (ref 0–6)
ERYTHROCYTE [DISTWIDTH] IN BLOOD BY AUTOMATED COUNT: 13 % (ref 11.6–15.1)
ETHANOL SERPL-MCNC: 355 MG/DL
GFR SERPL CREATININE-BSD FRML MDRD: 61 ML/MIN/1.73SQ M
GLUCOSE SERPL-MCNC: 101 MG/DL (ref 65–140)
HCT VFR BLD AUTO: 38.7 % (ref 36.5–49.3)
HGB BLD-MCNC: 13.3 G/DL (ref 12–17)
IMM GRANULOCYTES # BLD AUTO: 0.04 THOUSAND/UL (ref 0–0.2)
IMM GRANULOCYTES NFR BLD AUTO: 1 % (ref 0–2)
INR PPP: 0.95 (ref 0.85–1.19)
LYMPHOCYTES # BLD AUTO: 4.07 THOUSANDS/ΜL (ref 0.6–4.47)
LYMPHOCYTES NFR BLD AUTO: 45 % (ref 14–44)
MCH RBC QN AUTO: 31.3 PG (ref 26.8–34.3)
MCHC RBC AUTO-ENTMCNC: 34.4 G/DL (ref 31.4–37.4)
MCV RBC AUTO: 91 FL (ref 82–98)
MONOCYTES # BLD AUTO: 0.93 THOUSAND/ΜL (ref 0.17–1.22)
MONOCYTES NFR BLD AUTO: 11 % (ref 4–12)
NEUTROPHILS # BLD AUTO: 3.37 THOUSANDS/ΜL (ref 1.85–7.62)
NEUTS SEG NFR BLD AUTO: 38 % (ref 43–75)
NRBC BLD AUTO-RTO: 0 /100 WBCS
PLATELET # BLD AUTO: 273 THOUSANDS/UL (ref 149–390)
PMV BLD AUTO: 10.1 FL (ref 8.9–12.7)
POTASSIUM SERPL-SCNC: 4.6 MMOL/L (ref 3.5–5.3)
PROT SERPL-MCNC: 7.7 G/DL (ref 6.4–8.4)
PROTHROMBIN TIME: 13.1 SECONDS (ref 12.3–15)
RBC # BLD AUTO: 4.25 MILLION/UL (ref 3.88–5.62)
SODIUM SERPL-SCNC: 136 MMOL/L (ref 135–147)
WBC # BLD AUTO: 8.87 THOUSAND/UL (ref 4.31–10.16)

## 2025-01-31 PROCEDURE — 80053 COMPREHEN METABOLIC PANEL: CPT | Performed by: EMERGENCY MEDICINE

## 2025-01-31 PROCEDURE — 93005 ELECTROCARDIOGRAM TRACING: CPT

## 2025-01-31 PROCEDURE — 82077 ASSAY SPEC XCP UR&BREATH IA: CPT | Performed by: EMERGENCY MEDICINE

## 2025-01-31 PROCEDURE — 36415 COLL VENOUS BLD VENIPUNCTURE: CPT | Performed by: EMERGENCY MEDICINE

## 2025-01-31 PROCEDURE — 70450 CT HEAD/BRAIN W/O DYE: CPT

## 2025-01-31 PROCEDURE — 72125 CT NECK SPINE W/O DYE: CPT

## 2025-01-31 PROCEDURE — 71045 X-RAY EXAM CHEST 1 VIEW: CPT

## 2025-01-31 PROCEDURE — 99285 EMERGENCY DEPT VISIT HI MDM: CPT

## 2025-01-31 PROCEDURE — 99284 EMERGENCY DEPT VISIT MOD MDM: CPT | Performed by: EMERGENCY MEDICINE

## 2025-01-31 PROCEDURE — 85610 PROTHROMBIN TIME: CPT | Performed by: EMERGENCY MEDICINE

## 2025-01-31 PROCEDURE — 85025 COMPLETE CBC W/AUTO DIFF WBC: CPT | Performed by: EMERGENCY MEDICINE

## 2025-01-31 NOTE — TELEPHONE ENCOUNTER
Patient has been added to the Medication Management wait list without a referral.    Insurance: [facility]  Insurance Type:    Commercial []   Medicaid []   County (if applicable)   Medicare [x]  Location Preference: any  Provider Preference: any  Virtual: Yes [x] No []  Were outside resources sent: Yes [] No [x]

## 2025-02-01 LAB
ATRIAL RATE: 75 BPM
P AXIS: 62 DEGREES
PR INTERVAL: 170 MS
QRS AXIS: -39 DEGREES
QRSD INTERVAL: 70 MS
QT INTERVAL: 386 MS
QTC INTERVAL: 431 MS
T WAVE AXIS: 39 DEGREES
VENTRICULAR RATE: 75 BPM

## 2025-02-01 PROCEDURE — 93010 ELECTROCARDIOGRAM REPORT: CPT | Performed by: INTERNAL MEDICINE

## 2025-02-01 NOTE — DISCHARGE INSTRUCTIONS
A list of [facility] [place] resources are included below:    Housing Assistance  [facility] Housing Link 1-486.911.3062    Mental Health Crisis  [facility] ([facility]):  429.468.1868  Suicide Prevention Lifeline:  Call, Text or Chat - 988 or call 5-840-057-TALK (5952)  National de Prevencion del Suicidio:  5-990-693-1432  [facility] Crisis Intervention Services: 1-861.977.9313  ’s Crisis Line:  1-988.913.6296 press 1    Drug and Alcohol Services  [place] Drug and Alcohol Helpline:  2-973-126-HELP (6183)  Alcoholics Anonymous:  1-896.346.1660  [place]’s Crisis Line:  1-393.696.3711 press 1    Victim Services  [place] Domestic Abuse Hotline:  6-532-826-SAFE (9141)  [facility] ([facility]):  1-916.153.2431  Elder Abuse Hotline ([facility] on Aging):  1-268.919.2389    A full list of services can be found at: [email or web address]

## 2025-02-01 NOTE — ED CARE HANDOFF
Emergency Department Sign Out Note        Sign out and transfer of care from Dr. Harper. See Separate Emergency Department note.     The patient, Marvin Christianson, was evaluated by the previous provider for fall, alcohol intoxication.    Workup Completed:  Labs.  CT.    ED Course / Workup Pending (followup):  Pending Sherronres eval.                                     Procedures  Medical Decision Making    69 y.o. male presenting for evaluation after fall and alcohol intoxication.  Labs and CT completed, no evidence of traumatic injury.  Currently be evaluated by Larry.    Bcares evaluated in ED, patient accepted to detox facility tomorrow morning.  Patient does not wish to wait in ED until placed.  Ambulatory in ED with steady gait.    Patient prefers to return home and will be contacted in AM for detox placement.    Disposition: I have discussed with the patient our plan to discharge them from the ED and the patient is in agreement with this plan.     Discharge Plan: Patient provided with list of outpatient resources.     Followup: patient provided with AVS with RTED precautions and plan to f/u with PCP. Contact information provided in AVS.    Amount and/or Complexity of Data Reviewed  Labs: ordered.  Radiology: ordered.            Disposition  Final diagnoses:   Fall from standing, initial encounter   Alcohol intoxication (HCC)     Time reflects when diagnosis was documented in both MDM as applicable and the Disposition within this note       Time User Action Codes Description Comment    1/31/2025 10:07 PM Sukh Dewitt [W19.XXXA] Fall from standing, initial encounter     1/31/2025 10:07 PM Sukh Dewitt [F10.921] Alcohol intoxication delirium without use disorder (HCC)     1/31/2025 10:08 PM Sukh Dewitt Remove [F10.921] Alcohol intoxication delirium without use disorder (HCC)     1/31/2025 10:08 PM uSkh Dewitt [F10.929] Alcohol intoxication (HCC)           ED Disposition       ED  Disposition   Transfer to Another Facility - Out of Network    Condition   --    Date/Time   Fri Jan 31, 2025  5:34 PM    Comment   Marvin Christianson should be transferred out to [facility].               Follow-up Information       Follow up With Specialties Details Why Contact Info    Gigi Herndon MD Internal Medicine Schedule an appointment as soon as possible for a visit  To make appointment for reevaluation in 3-5 days. [address]  [address]  [facility] [address]  [phone]            Discharge Medication List as of 1/31/2025 10:09 PM        CONTINUE these medications which have NOT CHANGED    Details   amLODIPine (NORVASC) 10 mg tablet Historical Med      docusate sodium (COLACE) 100 mg capsule Take 1 capsule (100 mg total) by mouth every 12 (twelve) hours, Starting Fri 10/29/2021, Normal      FLUoxetine (PROzac) 20 mg capsule Take 20 mg by mouth every morning, Starting Wed 6/15/2022, Historical Med      levETIRAcetam (KEPPRA) 500 mg tablet Take 500 mg by mouth 2 (two) times a day, Starting Mon 7/11/2022, Historical Med      lisinopril (ZESTRIL) 5 mg tablet Take 1 tablet (5 mg total) by mouth daily Do not start before October 27, 2022., Starting Thu 10/27/2022, Normal      pantoprazole (PROTONIX) 40 mg tablet Historical Med           No discharge procedures on file.       ED Provider  Electronically Signed by     Sukh Dewitt,   01/31/25 7582

## 2025-02-01 NOTE — ED PROVIDER NOTES
ED Disposition       ED Disposition   Transfer to Another Facility - Out of Network    Condition   --    Date/Time   Fri Jan 31, 2025  5:34 PM    Comment   Marvin Christianson should be transferred out to [facility].               Assessment & Plan       Medical Decision Making  Patient is a 69-year-old male who presents for 5 patient of alcohol abuse/fall.  Traumatic workup.  Negative.  Noted to be significantly intoxicated.  Discussed with the [facility] and attempted to be placed in to rehab.  If unable to be placed by morning of 2/1, plan to discharge.    Amount and/or Complexity of Data Reviewed  Labs: ordered. Decision-making details documented in ED Course.  Radiology: ordered.        ED Course as of 01/31/25 2154   Fri Jan 31, 2025   1634 ETHANOL(!): 355   2147 Signout: Patient with significant alcohol intoxication and seeking rehab.  [facility] is working on placement.  Patient fell at home with negative traumatic workup.       Medications - No data to display    ED Risk Strat Scores                                              History of Present Illness       Chief Complaint   Patient presents with    Fall     Ems stated that pt was drinking gin. Ems states that pt was just discharged from [facility]. Pt states that he fell in the bathroom and felt dizzy.        Past Medical History:   Diagnosis Date    Alcohol abuse     Anxiety     Cancer (HCC)     kidney    Depression     GERD (gastroesophageal reflux disease)     Hypertension       Past Surgical History:   Procedure Laterality Date    NEPHRECTOMY Left     UMBILICAL HERNIA REPAIR        Family History   Problem Relation Age of Onset    Colon cancer Neg Hx     Colon polyps Neg Hx       Social History     Tobacco Use    Smoking status: Never    Smokeless tobacco: Never   Vaping Use    Vaping status: Never Used   Substance Use Topics    Alcohol use: Yes     Comment: a gallon of gin daily    Drug use: Never      E-Cigarette/Vaping    E-Cigarette Use Never User        E-Cigarette/Vaping Substances    Nicotine No     THC No     CBD No     Flavoring No     Other No     Unknown No       I have reviewed and agree with the history as documented.     Patient is a 69-year-old male with a history of alcoholism that presents for evaluation after a fall.  Has been drinking excessively at home over the past couple days.  Patient says that he fell while urinating.  Otherwise he denies complaints.  No blood thinners or any platelet agents noted.  He denies headache nausea vomit chest pain dyspnea abdominal pain.  He is seeking rehab for alcohol abuse.        Review of Systems   Constitutional:  Negative for fever.   HENT:  Negative for sore throat.    Eyes:  Negative for photophobia.   Respiratory:  Negative for shortness of breath.    Cardiovascular:  Negative for chest pain.   Gastrointestinal:  Negative for abdominal pain.   Genitourinary:  Negative for dysuria.   Musculoskeletal:  Negative for back pain.   Skin:  Negative for rash.   Neurological:  Negative for light-headedness.   Hematological:  Negative for adenopathy.   Psychiatric/Behavioral:  Negative for agitation.    All other systems reviewed and are negative.          Objective       ED Triage Vitals   Temperature Pulse Blood Pressure Respirations SpO2 Patient Position - Orthostatic VS   01/31/25 1552 01/31/25 1552 01/31/25 1552 01/31/25 1552 01/31/25 1552 01/31/25 1630   97.5 °F (36.4 °C) 74 (!) 180/86 18 95 % Lying      Temp Source Heart Rate Source BP Location FiO2 (%) Pain Score    01/31/25 1552 01/31/25 1630 01/31/25 1630 -- 01/31/25 2037    Oral Monitor Right arm  No Pain      Vitals      Date and Time Temp Pulse SpO2 Resp BP Pain Score FACES Pain Rating User   01/31/25 2111 98.5 °F (36.9 °C) 93 93 % 20 173/83 No Pain -- AM   01/31/25 2053 -- -- -- -- -- No Pain -- AM   01/31/25 2037 -- 84 98 % 20 154/74 No Pain -- [place]   01/31/25 2030 -- 103 95 % 18 154/74 -- -- [place]   01/31/25 1900 -- 80 97 % 20 119/74 -- -- [place]   01/31/25  1830 -- 75 96 % 19 122/69 -- -- RN   01/31/25 1800 -- 83 98 % 18 132/93 -- -- RN   01/31/25 1730 -- 73 97 % 19 131/69 -- -- RN   01/31/25 1700 -- 74 97 % 19 131/68 -- -- RN   01/31/25 1630 -- 73 95 % 18 135/73 -- -- ELF   01/31/25 1552 97.5 °F (36.4 °C) 74 95 % 18 180/86 -- -- LD            Physical Exam  Vitals reviewed.   Constitutional:       General: He is not in acute distress.     Appearance: He is well-developed.      Comments: Intoxicated   HENT:      Head: Normocephalic.   Eyes:      Pupils: Pupils are equal, round, and reactive to light.   Cardiovascular:      Rate and Rhythm: Normal rate and regular rhythm.      Heart sounds: Normal heart sounds. No murmur heard.     No friction rub. No gallop.   Pulmonary:      Effort: Pulmonary effort is normal.      Breath sounds: Normal breath sounds.   Abdominal:      General: Bowel sounds are normal. There is no distension.      Palpations: Abdomen is soft.      Tenderness: There is no abdominal tenderness. There is no guarding.   Musculoskeletal:         General: Normal range of motion.      Cervical back: Normal range of motion and neck supple.      Comments: Abrasion left elbow   Skin:     Capillary Refill: Capillary refill takes less than 2 seconds.   Neurological:      Mental Status: He is alert and oriented to person, place, and time.      Cranial Nerves: No cranial nerve deficit.      Sensory: No sensory deficit.      Motor: No abnormal muscle tone.   Psychiatric:         Behavior: Behavior normal.         Thought Content: Thought content normal.         Judgment: Judgment normal.         Results Reviewed       Procedure Component Value Units Date/Time    Protime-INR [[id number]]  (Normal) Collected: 01/31/25 1604    Lab Status: Final result Specimen: Blood from Arm, Right Updated: 01/31/25 1633     Protime 13.1 seconds      INR 0.95    Narrative:      INR Therapeutic Range    Indication                                             INR Range      Atrial  Fibrillation                                               2.0-3.0  Hypercoagulable State                                    2.0.2.3  Left Ventricular Asist Device                            2.0-3.0  Mechanical Heart Valve                                  -    Aortic(with afib, MI, embolism, HF, LA enlargement,    and/or coagulopathy)                                     2.0-3.0 (2.5-3.5)     Mitral                                                             2.5-3.5  Prosthetic/Bioprosthetic Heart Valve               2.0-3.0  Venous thromboembolism (VTE: VT, PE        2.0-3.0    Ethanol [[id number]]  (Abnormal) Collected: 01/31/25 1604    Lab Status: Final result Specimen: Blood from Arm, Right Updated: 01/31/25 1628     Ethanol Lvl 355 mg/dL     Comprehensive metabolic panel [[id number]] Collected: 01/31/25 1604    Lab Status: Final result Specimen: Blood from Arm, Right Updated: 01/31/25 1628     Sodium 136 mmol/L      Potassium 4.6 mmol/L      Chloride 104 mmol/L      CO2 23 mmol/L      ANION GAP 9 mmol/L      BUN 19 mg/dL      Creatinine 1.21 mg/dL      Glucose 101 mg/dL      Calcium 9.5 mg/dL      AST 22 U/L      ALT 17 U/L      Alkaline Phosphatase 58 U/L      Total Protein 7.7 g/dL      Albumin 4.7 g/dL      Total Bilirubin 0.72 mg/dL      eGFR 61 ml/min/1.73sq m     Narrative:      National Kidney Disease Foundation guidelines for Chronic Kidney Disease (CKD):     Stage 1 with normal or high GFR (GFR > 90 mL/min/1.73 square meters)    Stage 2 Mild CKD (GFR = 60-89 mL/min/1.73 square meters)    Stage 3A Moderate CKD (GFR = 45-59 mL/min/1.73 square meters)    Stage 3B Moderate CKD (GFR = 30-44 mL/min/1.73 square meters)    Stage 4 Severe CKD (GFR = 15-29 mL/min/1.73 square meters)    Stage 5 End Stage CKD (GFR <15 mL/min/1.73 square meters)  Note: GFR calculation is accurate only with a steady state creatinine    CBC and differential [[id number]]  (Abnormal) Collected: 01/31/25 1604    Lab Status: Final result  Specimen: Blood from Arm, Right Updated: 01/31/25 1619     WBC 8.87 Thousand/uL      RBC 4.25 Million/uL      Hemoglobin 13.3 g/dL      Hematocrit 38.7 %      MCV 91 fL      MCH 31.3 pg      MCHC 34.4 g/dL      RDW 13.0 %      MPV 10.1 fL      Platelets 273 Thousands/uL      nRBC 0 /100 WBCs      Segmented % 38 %      Immature Grans % 1 %      Lymphocytes % 45 %      Monocytes % 11 %      Eosinophils Relative 5 %      Basophils Relative 0 %      Absolute Neutrophils 3.37 Thousands/µL      Absolute Immature Grans 0.04 Thousand/uL      Absolute Lymphocytes 4.07 Thousands/µL      Absolute Monocytes 0.93 Thousand/µL      Eosinophils Absolute 0.43 Thousand/µL      Basophils Absolute 0.03 Thousands/µL             CT head without contrast   Final Interpretation by Cholo Duckworth MD (01/31 1644)      No acute intracranial abnormality.  Stable chronic microangiopathic changes within the brain.                  Workstation performed: [id number]         CT spine cervical without contrast   Final Interpretation by Cholo Duckworth MD (01/31 1646)      No cervical spine fracture or traumatic malalignment.                  Workstation performed: [id number]         XR chest 1 view portable   Final Interpretation by Zakia Alejo MD (01/31 2026)      No acute cardiopulmonary disease.      No acute displaced fractures.      Workstation performed: [id number]             Procedures    ED Medication and Procedure Management   Prior to Admission Medications   Prescriptions Last Dose Informant Patient Reported? Taking?   FLUoxetine (PROzac) 20 mg capsule   Yes No   Sig: Take 20 mg by mouth every morning   amLODIPine (NORVASC) 10 mg tablet  Self Yes No   docusate sodium (COLACE) 100 mg capsule  Self No No   Sig: Take 1 capsule (100 mg total) by mouth every 12 (twelve) hours   levETIRAcetam (KEPPRA) 500 mg tablet   Yes No   Sig: Take 500 mg by mouth 2 (two) times a day   lisinopril (ZESTRIL) 5 mg tablet   No No   Sig: Take 1  tablet (5 mg total) by mouth daily Do not start before October 27, 2022.   pantoprazole (PROTONIX) 40 mg tablet  Self Yes No      Facility-Administered Medications: None     Patient's Medications   Discharge Prescriptions    No medications on file     No discharge procedures on file.  ED SEPSIS DOCUMENTATION            Landon Harper MD  01/31/25 4665

## 2025-03-25 ENCOUNTER — APPOINTMENT (EMERGENCY)
Dept: RADIOLOGY | Facility: HOSPITAL | Age: 70
End: 2025-03-25
Payer: COMMERCIAL

## 2025-03-25 ENCOUNTER — APPOINTMENT (EMERGENCY)
Dept: CT IMAGING | Facility: HOSPITAL | Age: 70
End: 2025-03-25
Payer: COMMERCIAL

## 2025-03-25 ENCOUNTER — HOSPITAL ENCOUNTER (EMERGENCY)
Facility: HOSPITAL | Age: 70
Discharge: HOME | End: 2025-03-25
Attending: EMERGENCY MEDICINE
Payer: COMMERCIAL

## 2025-03-25 VITALS
SYSTOLIC BLOOD PRESSURE: 147 MMHG | HEART RATE: 89 BPM | OXYGEN SATURATION: 93 % | TEMPERATURE: 98 F | DIASTOLIC BLOOD PRESSURE: 74 MMHG | RESPIRATION RATE: 18 BRPM
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
## 2025-03-25 DIAGNOSIS — S16.1XXA CERVICAL STRAIN: ICD-10-CM

## 2025-03-25 DIAGNOSIS — S09.90XA HEAD INJURY: ICD-10-CM

## 2025-03-25 DIAGNOSIS — F10.10 ALCOHOL ABUSE: ICD-10-CM

## 2025-03-25 DIAGNOSIS — F10.929 ALCOHOL INTOXICATION (HCC): ICD-10-CM

## 2025-03-25 LAB
2HR DELTA HS TROPONIN: -1 NG/L
ALBUMIN SERPL BCG-MCNC: 4.3 G/DL (ref 3.5–5)
ALP SERPL-CCNC: 62 U/L (ref 34–104)
ALT SERPL W P-5'-P-CCNC: 10 U/L (ref 7–52)
ANION GAP SERPL CALCULATED.3IONS-SCNC: 7 MMOL/L (ref 4–13)
APTT PPP: 28 SECONDS (ref 23–34)
AST SERPL W P-5'-P-CCNC: 15 U/L (ref 13–39)
BASOPHILS # BLD AUTO: 0.05 THOUSANDS/ÂΜL (ref 0–0.1)
BASOPHILS NFR BLD AUTO: 1 % (ref 0–1)
BILIRUB SERPL-MCNC: 0.46 MG/DL (ref 0.2–1)
BILIRUB UR QL STRIP: NEGATIVE
BUN SERPL-MCNC: 14 MG/DL (ref 5–25)
CALCIUM SERPL-MCNC: 9.4 MG/DL (ref 8.4–10.2)
CARDIAC TROPONIN I PNL SERPL HS: 7 NG/L (ref ?–50)
CARDIAC TROPONIN I PNL SERPL HS: 8 NG/L (ref ?–50)
CHLORIDE SERPL-SCNC: 103 MMOL/L (ref 96–108)
CLARITY UR: CLEAR
CO2 SERPL-SCNC: 24 MMOL/L (ref 21–32)
COLOR UR: ABNORMAL
CREAT SERPL-MCNC: 1.02 MG/DL (ref 0.6–1.3)
EOSINOPHIL # BLD AUTO: 0.47 THOUSAND/ÂΜL (ref 0–0.61)
EOSINOPHIL NFR BLD AUTO: 7 % (ref 0–6)
ERYTHROCYTE [DISTWIDTH] IN BLOOD BY AUTOMATED COUNT: 14.6 % (ref 11.6–15.1)
ETHANOL EXG-MCNC: 0.1 MG/DL
ETHANOL EXG-MCNC: 0.17 MG/DL
ETHANOL EXG-MCNC: 0.78 MG/DL
ETHANOL SERPL-MCNC: 315 MG/DL
GFR SERPL CREATININE-BSD FRML MDRD: 75 ML/MIN/1.73SQ M
GLUCOSE SERPL-MCNC: 110 MG/DL (ref 65–140)
GLUCOSE UR STRIP-MCNC: NEGATIVE MG/DL
HCT VFR BLD AUTO: 39.3 % (ref 36.5–49.3)
HGB BLD-MCNC: 13.2 G/DL (ref 12–17)
HGB UR QL STRIP.AUTO: NEGATIVE
IMM GRANULOCYTES # BLD AUTO: 0.03 THOUSAND/UL (ref 0–0.2)
IMM GRANULOCYTES NFR BLD AUTO: 1 % (ref 0–2)
INR PPP: 0.98 (ref 0.85–1.19)
KETONES UR STRIP-MCNC: NEGATIVE MG/DL
LEUKOCYTE ESTERASE UR QL STRIP: NEGATIVE
LYMPHOCYTES # BLD AUTO: 3.09 THOUSANDS/ÂΜL (ref 0.6–4.47)
LYMPHOCYTES NFR BLD AUTO: 48 % (ref 14–44)
MCH RBC QN AUTO: 30.5 PG (ref 26.8–34.3)
MCHC RBC AUTO-ENTMCNC: 33.6 G/DL (ref 31.4–37.4)
MCV RBC AUTO: 91 FL (ref 82–98)
MONOCYTES # BLD AUTO: 0.57 THOUSAND/ÂΜL (ref 0.17–1.22)
MONOCYTES NFR BLD AUTO: 9 % (ref 4–12)
NEUTROPHILS # BLD AUTO: 2.21 THOUSANDS/ÂΜL (ref 1.85–7.62)
NEUTS SEG NFR BLD AUTO: 34 % (ref 43–75)
NITRITE UR QL STRIP: NEGATIVE
NRBC BLD AUTO-RTO: 0 /100 WBCS
PH UR STRIP.AUTO: 5 [PH]
PLATELET # BLD AUTO: 284 THOUSANDS/UL (ref 149–390)
PMV BLD AUTO: 9.7 FL (ref 8.9–12.7)
POTASSIUM SERPL-SCNC: 4.2 MMOL/L (ref 3.5–5.3)
PROT SERPL-MCNC: 7.8 G/DL (ref 6.4–8.4)
PROT UR STRIP-MCNC: NEGATIVE MG/DL
PROTHROMBIN TIME: 13.5 SECONDS (ref 12.3–15)
RBC # BLD AUTO: 4.33 MILLION/UL (ref 3.88–5.62)
SODIUM SERPL-SCNC: 134 MMOL/L (ref 135–147)
SP GR UR STRIP.AUTO: <1.005 (ref 1–1.03)
UROBILINOGEN UR STRIP-ACNC: <2 MG/DL
WBC # BLD AUTO: 6.42 THOUSAND/UL (ref 4.31–10.16)

## 2025-03-25 PROCEDURE — 80053 COMPREHEN METABOLIC PANEL: CPT | Performed by: PHYSICIAN ASSISTANT

## 2025-03-25 PROCEDURE — 93005 ELECTROCARDIOGRAM TRACING: CPT

## 2025-03-25 PROCEDURE — 85025 COMPLETE CBC W/AUTO DIFF WBC: CPT | Performed by: PHYSICIAN ASSISTANT

## 2025-03-25 PROCEDURE — 71045 X-RAY EXAM CHEST 1 VIEW: CPT

## 2025-03-25 PROCEDURE — 70450 CT HEAD/BRAIN W/O DYE: CPT

## 2025-03-25 PROCEDURE — 99285 EMERGENCY DEPT VISIT HI MDM: CPT | Performed by: PHYSICIAN ASSISTANT

## 2025-03-25 PROCEDURE — 85730 THROMBOPLASTIN TIME PARTIAL: CPT | Performed by: PHYSICIAN ASSISTANT

## 2025-03-25 PROCEDURE — 96360 HYDRATION IV INFUSION INIT: CPT

## 2025-03-25 PROCEDURE — 82075 ASSAY OF BREATH ETHANOL: CPT | Performed by: PHYSICIAN ASSISTANT

## 2025-03-25 PROCEDURE — 72125 CT NECK SPINE W/O DYE: CPT

## 2025-03-25 PROCEDURE — 85610 PROTHROMBIN TIME: CPT | Performed by: PHYSICIAN ASSISTANT

## 2025-03-25 PROCEDURE — 84484 ASSAY OF TROPONIN QUANT: CPT | Performed by: PHYSICIAN ASSISTANT

## 2025-03-25 PROCEDURE — 36415 COLL VENOUS BLD VENIPUNCTURE: CPT | Performed by: PHYSICIAN ASSISTANT

## 2025-03-25 PROCEDURE — 82077 ASSAY SPEC XCP UR&BREATH IA: CPT | Performed by: PHYSICIAN ASSISTANT

## 2025-03-25 PROCEDURE — 99285 EMERGENCY DEPT VISIT HI MDM: CPT

## 2025-03-25 PROCEDURE — 81003 URINALYSIS AUTO W/O SCOPE: CPT | Performed by: PHYSICIAN ASSISTANT

## 2025-03-25 RX ADMIN — SODIUM CHLORIDE 1000 ML: 0.9 INJECTION, SOLUTION INTRAVENOUS at 13:15

## 2025-03-25 NOTE — ED PROVIDER NOTES
Emergency Department Trauma Note  Marvin Christianson 69 y.o. male MRN: [id number]  Unit/Bed#: ED TR13/TR13B Encounter: [id number]      Trauma Alert: Trauma Acuity: Trauma Evaluation  Model of Arrival: Mode of Arrival: BLS via    Trauma Team: Current Providers  Attending Provider: Rico Bateman DO  Physician Assistant: Jackie Jansen PA-C  Registered Nurse: Linus Morris RN  Consultants:     None      History of Present Illness     Chief Complaint:   Chief Complaint   Patient presents with    Fall     Pt presents to ED via EMS from [facility]. EMS states that he has fallen three times today; EMS states he drinks at least a gallon of gin a day; patient denies any pain or injuries at this time; states he doesn't know if he hit his head     HPI:  Marvin Christianson is a 69 y.o. male who presents with multiple falls that occurred today. Patient states he fell 3 times in his house today.  He has been drinking gin all day.  He states his PCP told him to go to the ED if he falls.  He is alert and oriented x 3.  He denies any injuries.  No anticoagulants, but appears moderately intoxicated. Patient has cervical collar in place. He denies numbness or weakness.  Patient offered [facility], states he was just discharged from [facility].   Mechanism:Details of Incident: fall Injury Date: 03/25/25        HPI  Review of Systems   Unable to perform ROS: Other (intoxicated)   Constitutional:  Negative for chills and fever.   HENT: Negative.     Musculoskeletal:  Negative for back pain and neck pain.   Skin:  Negative for color change, pallor and rash.   Neurological:  Negative for dizziness, weakness, numbness and headaches.   Psychiatric/Behavioral:  Negative for confusion.        Historical Information     Immunizations:   Immunization History   Administered Date(s) Administered    COVID-19 Moderna mRNA Vaccine 12 Yr+ 50 mcg/0.5 mL (Spikevax) 10/30/2023    COVID-19 PFIZER VACCINE 0.3 ML IM 03/10/2021, 03/31/2021     COVID-19 Pfizer vac ([place]ilton-sucrose, gray cap) 12 yr+ IM 05/11/2022    Tdap 10/24/2022       Past Medical History:   Diagnosis Date    Alcohol abuse     Anxiety     Cancer (HCC)     kidney    Depression     GERD (gastroesophageal reflux disease)     Hypertension        Family History   Problem Relation Age of Onset    Colon cancer Neg Hx     Colon polyps Neg Hx      Past Surgical History:   Procedure Laterality Date    NEPHRECTOMY Left     UMBILICAL HERNIA REPAIR       Social History     Tobacco Use    Smoking status: Never    Smokeless tobacco: Never   Vaping Use    Vaping status: Never Used   Substance Use Topics    Alcohol use: Yes     Comment: a gallon of gin daily    Drug use: Never     E-Cigarette/Vaping    E-Cigarette Use Never User      E-Cigarette/Vaping Substances    Nicotine No     THC No     CBD No     Flavoring No     Other No     Unknown No        Family History: non-contributory    Meds/Allergies   Prior to Admission Medications   Prescriptions Last Dose Informant Patient Reported? Taking?   FLUoxetine (PROzac) 20 mg capsule   Yes No   Sig: Take 20 mg by mouth every morning   amLODIPine (NORVASC) 10 mg tablet  Self Yes No   docusate sodium (COLACE) 100 mg capsule  Self No No   Sig: Take 1 capsule (100 mg total) by mouth every 12 (twelve) hours   levETIRAcetam (KEPPRA) 500 mg tablet   Yes No   Sig: Take 500 mg by mouth 2 (two) times a day   lisinopril (ZESTRIL) 5 mg tablet   No No   Sig: Take 1 tablet (5 mg total) by mouth daily Do not start before October 27, 2022.   pantoprazole (PROTONIX) 40 mg tablet  Self Yes No      Facility-Administered Medications: None       No Known Allergies    PHYSICAL EXAM    PE limited by: nothing    Objective   Vitals:   First set: Temperature: 98 °F (36.7 °C) (03/25/25 1307)  Pulse: 95 (03/25/25 1307)  Respirations: 20 (03/25/25 1307)  Blood Pressure: 142/81 (03/25/25 1307)  SpO2: 95 % (03/25/25 1307)    Primary Survey:   (A) Airway: patent  (B) Breathing: normal  (C)  Circulation: Pulses:   normal  (D) Disabliity:  GCS Total:  15  (E) Expose:  Completed    Secondary Survey: (Click on Physical Exam tab above)  Physical Exam  Vitals and nursing note reviewed.   Constitutional:       General: He is not in acute distress.     Appearance: Normal appearance. He is well-developed and well-groomed. He is not ill-appearing.      Interventions: Cervical collar in place.   HENT:      Head: Normocephalic and atraumatic.      Ears:      Comments: Patient [place]     Nose: Nose normal.      Mouth/Throat:      Mouth: Mucous membranes are moist.      Pharynx: Oropharynx is clear.   Eyes:      Conjunctiva/sclera: Conjunctivae normal.   Cardiovascular:      Rate and Rhythm: Normal rate and regular rhythm.      Heart sounds: Normal heart sounds.   Pulmonary:      Effort: Pulmonary effort is normal.      Breath sounds: Normal breath sounds. No wheezing, rhonchi or rales.   Chest:      Chest wall: No swelling or tenderness.   Abdominal:      General: Abdomen is flat. Bowel sounds are normal.      Palpations: Abdomen is soft.      Tenderness: There is no abdominal tenderness.   Musculoskeletal:      Cervical back: Normal, normal range of motion and neck supple.      Thoracic back: Normal.      Lumbar back: Normal.      Comments: B/L UE and LE FROM, non tender to palpation.    Skin:     General: Skin is warm and dry.      Coloration: Skin is not jaundiced or pale.      Findings: No bruising or wound.   Neurological:      Mental Status: He is alert and oriented to person, place, and time.      GCS: GCS eye subscore is 4. GCS verbal subscore is 5. GCS motor subscore is 6.      Cranial Nerves: Cranial nerves 2-12 are intact.      Sensory: Sensation is intact.      Motor: Motor function is intact.      Coordination: Coordination is intact.         Cervical spine cleared by clinical criteria? No (imaging required)      Invasive Devices       Peripheral Intravenous Line  Duration             Peripheral IV  03/25/25 Left Antecubital <1 day                    Lab Results:   Results Reviewed       Procedure Component Value Units Date/Time    POCT alcohol breath test [[id number]]  (Normal) Resulted: 03/25/25 1859    Lab Status: Final result Updated: 03/25/25 1859     EXTBreath Alcohol 0.100    HS Troponin I 2hr [[id number]]  (Normal) Collected: 03/25/25 1723    Lab Status: Final result Specimen: Blood from Arm, Left Updated: 03/25/25 1748     hs TnI 2hr 7 ng/L      Delta 2hr hsTnI -1 ng/L     POCT alcohol breath test [[id number]]  (Normal) Resulted: 03/25/25 1544    Lab Status: Final result Updated: 03/25/25 1544     EXTBreath Alcohol 0.175    UA w Reflex to Microscopic w Reflex to Culture [[id number]]  (Abnormal) Collected: 03/25/25 1344    Lab Status: Final result Specimen: Urine, Other Updated: 03/25/25 1413     Color, UA Light Yellow     Clarity, UA Clear     Specific Gravity, UA <1.005     pH, UA 5.0     Leukocytes, UA Negative     Nitrite, UA Negative     Protein, UA Negative mg/dl      Glucose, UA Negative mg/dl      Ketones, UA Negative mg/dl      Urobilinogen, UA <2.0 mg/dl      Bilirubin, UA Negative     Occult Blood, UA Negative    Protime-INR [[id number]]  (Normal) Collected: 03/25/25 1314    Lab Status: Final result Specimen: Blood from Arm, Left Updated: 03/25/25 1400     Protime 13.5 seconds      INR 0.98    Narrative:      INR Therapeutic Range    Indication                                             INR Range      Atrial Fibrillation                                               2.0-3.0  Hypercoagulable State                                    2.0.2.3  Left Ventricular Asist Device                            2.0-3.0  Mechanical Heart Valve                                  -    Aortic(with afib, MI, embolism, HF, LA enlargement,    and/or coagulopathy)                                     2.0-3.0 (2.5-3.5)     Mitral                                                             2.5-3.5  Prosthetic/Bioprosthetic  Heart Valve               2.0-3.0  Venous thromboembolism (VTE: VT, PE        2.0-3.0    APTT [[id number]]  (Normal) Collected: 03/25/25 1314    Lab Status: Final result Specimen: Blood from Arm, Left Updated: 03/25/25 1400     PTT 28 seconds     HS Troponin 0hr (reflex protocol) [[id number]]  (Normal) Collected: 03/25/25 1314    Lab Status: Final result Specimen: Blood from Arm, Left Updated: 03/25/25 1349     hs TnI 0hr 8 ng/L     Ethanol [[id number]]  (Abnormal) Collected: 03/25/25 1314    Lab Status: Final result Specimen: Blood from Arm, Left Updated: 03/25/25 1344     Ethanol Lvl 315 mg/dL     Comprehensive metabolic panel [[id number]]  (Abnormal) Collected: 03/25/25 1314    Lab Status: Final result Specimen: Blood from Arm, Left Updated: 03/25/25 1342     Sodium 134 mmol/L      Potassium 4.2 mmol/L      Chloride 103 mmol/L      CO2 24 mmol/L      ANION GAP 7 mmol/L      BUN 14 mg/dL      Creatinine 1.02 mg/dL      Glucose 110 mg/dL      Calcium 9.4 mg/dL      AST 15 U/L      ALT 10 U/L      Alkaline Phosphatase 62 U/L      Total Protein 7.8 g/dL      Albumin 4.3 g/dL      Total Bilirubin 0.46 mg/dL      eGFR 75 ml/min/1.73sq m     Narrative:      National Kidney Disease Foundation guidelines for Chronic Kidney Disease (CKD):     Stage 1 with normal or high GFR (GFR > 90 mL/min/1.73 square meters)    Stage 2 Mild CKD (GFR = 60-89 mL/min/1.73 square meters)    Stage 3A Moderate CKD (GFR = 45-59 mL/min/1.73 square meters)    Stage 3B Moderate CKD (GFR = 30-44 mL/min/1.73 square meters)    Stage 4 Severe CKD (GFR = 15-29 mL/min/1.73 square meters)    Stage 5 End Stage CKD (GFR <15 mL/min/1.73 square meters)  Note: GFR calculation is accurate only with a steady state creatinine    CBC and differential [[id number]]  (Abnormal) Collected: 03/25/25 1314    Lab Status: Final result Specimen: Blood from Arm, Left Updated: 03/25/25 1327     WBC 6.42 Thousand/uL      RBC 4.33 Million/uL      Hemoglobin 13.2 g/dL       Hematocrit 39.3 %      MCV 91 fL      MCH 30.5 pg      MCHC 33.6 g/dL      RDW 14.6 %      MPV 9.7 fL      Platelets 284 Thousands/uL      nRBC 0 /100 WBCs      Segmented % 34 %      Immature Grans % 1 %      Lymphocytes % 48 %      Monocytes % 9 %      Eosinophils Relative 7 %      Basophils Relative 1 %      Absolute Neutrophils 2.21 Thousands/µL      Absolute Immature Grans 0.03 Thousand/uL      Absolute Lymphocytes 3.09 Thousands/µL      Absolute Monocytes 0.57 Thousand/µL      Eosinophils Absolute 0.47 Thousand/µL      Basophils Absolute 0.05 Thousands/µL                    Imaging Studies:   Direct to CT: No  TRAUMA - CT head wo contrast   Final Result by Syed Hylton MD (03/25 1353)      No intracranial hemorrhage or calvarial fracture.      The study was marked in EPIC for immediate notification.                  Workstation performed: [id number]         TRAUMA - CT spine cervical wo contrast   Final Result by Syed Hylton MD (03/25 1355)      No cervical spine fracture or traumatic malalignment.      The study was marked in EPIC for immediate notification.            Workstation performed: [id number]         XR Trauma chest portable   Final Result by Syed Hylton MD (03/25 1359)      No acute cardiopulmonary disease.            Workstation performed: [id number]               Procedures  ECG 12 Lead Documentation Only    Date/Time: 3/25/2025 1:31 PM    Performed by: Jackie Jansen PA-C  Authorized by: Jackie Jansen PA-C    Indications / Diagnosis:  Fall  ECG reviewed by me, the ED Provider: yes    Patient location:  ED  Previous ECG:     Previous ECG:  Compared to current    Similarity:  No change  Rate:     ECG rate:  73  Rhythm:     Rhythm: sinus rhythm    Conduction:     Conduction: normal    ST segments:     ST segments:  Normal  T waves:     T waves: normal             ED Course  ED Course as of 03/25/25 1914 Tue Mar 25, 2025   1507  Patient evaluated by [facility], patient declines treatment.            Medical Decision Making  Patient with multiple falls today, alcohol intoxication, will order CT head and neck to r/o cervical fracture or brain hemorrhage.  Will order labs to r/o anemia, electrolyte abnormality.  No acute abnormalities on imaging or labs.  [facility] consulted, patient declined treatment, he was monitored until BAT legal limit.    Patient stable for D/c.  Head injury instruction with strict return precautions given.     Amount and/or Complexity of Data Reviewed  External Data Reviewed: labs and ECG.  Labs: ordered.  Radiology: ordered.  ECG/medicine tests: ordered.  Discussion of management or test interpretation with external provider(s): [facility]OT                Disposition  Priority One Transfer: No  Final diagnoses:   Fall   Head injury   Cervical strain   Alcohol intoxication (HCC)   Alcohol abuse     Time reflects when diagnosis was documented in both MDM as applicable and the Disposition within this note       Time User Action Codes Description Comment    3/25/2025  5:59 PM Jackie Jansen [W19.XXXA] Fall     3/25/2025  5:59 PM Jackie Jansen Add [S09.90XA] Head injury     3/25/2025  5:59 PM Jackie Jansen Add [S16.1XXA] Cervical strain     3/25/2025  5:59 PM Jackie Jansen Add [F10.929] Alcohol intoxication (HCC)     3/25/2025  6:00 PM Jackie Jansen Add [F10.10] Alcohol abuse           ED Disposition       ED Disposition   Discharge    Condition   Stable    Date/Time   Tue Mar 25, 2025  7:05 PM    Comment   Marvin Christianson discharge to home/self care.                   Follow-up Information       Follow up With Specialties Details Why Contact Info Additional Information    Gigi Herndon MD Internal Medicine Schedule an appointment as soon as possible for a visit in 2 days For recheck [address] [facility] [facility].  [address]  [facility] [address]  [phone]        [facility] Emergency  Department Emergency Medicine Go to  If symptoms worsen [address] [place] [id number] [phone] [facility] Emergency Department, [address] [place], [place] [address]          Patient's Medications   Discharge Prescriptions    No medications on file     No discharge procedures on file.    PDMP Review         Value Time User    PDMP Reviewed  Yes 10/25/2022  4:29 PM Brii Aguayo PA-C            ED Provider  Electronically Signed by           Jackie Jansen PA-C  03/25/25 7059

## 2025-03-25 NOTE — DISCHARGE INSTRUCTIONS
Follow up with PCP for recheck in 1-2 days.  Return to ER if change in behavior or vomiting.  Follow up with [facility] for alcohol abuse.

## 2025-03-26 NOTE — ED CARE HANDOFF
[facility] Warm Handoff Outcome Note    Patient name Marvin Christianson  Location ED TR13/TR13B MRN [id number]  Age: 69 y.o.          Plan Type:  Warm Handoff                                                                                    Plan Date: 3/25/2025  Service:  ED Warm Handoff      Substance Use History:  ETOH    Warm Handoff Update:  Pt declined [facility] services due to just being released from an IP program.    Warm Handoff Outcome: Treatment Related Resources

## 2025-03-27 LAB
ATRIAL RATE: 73 BPM
P AXIS: 59 DEGREES
PR INTERVAL: 174 MS
QRS AXIS: -43 DEGREES
QRSD INTERVAL: 70 MS
QT INTERVAL: 394 MS
QTC INTERVAL: 434 MS
T WAVE AXIS: 46 DEGREES
VENTRICULAR RATE: 73 BPM

## 2025-03-27 PROCEDURE — 93010 ELECTROCARDIOGRAM REPORT: CPT | Performed by: INTERNAL MEDICINE

## 2025-04-18 ENCOUNTER — HOSPITAL ENCOUNTER (EMERGENCY)
Facility: HOSPITAL | Age: 70
Discharge: HOME | End: 2025-04-18
Attending: EMERGENCY MEDICINE
Payer: COMMERCIAL

## 2025-04-18 ENCOUNTER — APPOINTMENT (EMERGENCY)
Dept: RADIOLOGY | Facility: HOSPITAL | Age: 70
End: 2025-04-18
Payer: COMMERCIAL

## 2025-04-18 ENCOUNTER — APPOINTMENT (EMERGENCY)
Dept: CT IMAGING | Facility: HOSPITAL | Age: 70
End: 2025-04-18
Payer: COMMERCIAL

## 2025-04-18 VITALS
HEART RATE: 75 BPM | SYSTOLIC BLOOD PRESSURE: 141 MMHG | BODY MASS INDEX: 27.63 KG/M2 | RESPIRATION RATE: 15 BRPM | TEMPERATURE: 97.6 F | DIASTOLIC BLOOD PRESSURE: 73 MMHG | OXYGEN SATURATION: 93 % | WEIGHT: 182.32 LBS | HEIGHT: 68 IN

## 2025-04-18 DIAGNOSIS — F10.929 ALCOHOL INTOXICATION (HCC): ICD-10-CM
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
## 2025-04-18 LAB
ALBUMIN SERPL BCG-MCNC: 4.6 G/DL (ref 3.5–5)
ALP SERPL-CCNC: 64 U/L (ref 34–104)
ALT SERPL W P-5'-P-CCNC: 12 U/L (ref 7–52)
ANION GAP SERPL CALCULATED.3IONS-SCNC: 12 MMOL/L (ref 4–13)
APTT PPP: 29 SECONDS (ref 23–34)
AST SERPL W P-5'-P-CCNC: 19 U/L (ref 13–39)
ATRIAL RATE: 82 BPM
BASOPHILS # BLD AUTO: 0.06 THOUSANDS/ÂΜL (ref 0–0.1)
BASOPHILS NFR BLD AUTO: 1 % (ref 0–1)
BILIRUB SERPL-MCNC: 0.89 MG/DL (ref 0.2–1)
BUN SERPL-MCNC: 21 MG/DL (ref 5–25)
CALCIUM SERPL-MCNC: 9.8 MG/DL (ref 8.4–10.2)
CHLORIDE SERPL-SCNC: 102 MMOL/L (ref 96–108)
CO2 SERPL-SCNC: 20 MMOL/L (ref 21–32)
CREAT SERPL-MCNC: 1.26 MG/DL (ref 0.6–1.3)
EOSINOPHIL # BLD AUTO: 0.41 THOUSAND/ÂΜL (ref 0–0.61)
EOSINOPHIL NFR BLD AUTO: 5 % (ref 0–6)
ERYTHROCYTE [DISTWIDTH] IN BLOOD BY AUTOMATED COUNT: 15.3 % (ref 11.6–15.1)
ETHANOL EXG-MCNC: 0.14 MG/DL
ETHANOL SERPL-MCNC: 316 MG/DL
GFR SERPL CREATININE-BSD FRML MDRD: 58 ML/MIN/1.73SQ M
GLUCOSE SERPL-MCNC: 102 MG/DL (ref 65–140)
HCT VFR BLD AUTO: 39.8 % (ref 36.5–49.3)
HGB BLD-MCNC: 13.4 G/DL (ref 12–17)
IMM GRANULOCYTES # BLD AUTO: 0.02 THOUSAND/UL (ref 0–0.2)
IMM GRANULOCYTES NFR BLD AUTO: 0 % (ref 0–2)
INR PPP: 1 (ref 0.85–1.19)
LYMPHOCYTES # BLD AUTO: 4.34 THOUSANDS/ÂΜL (ref 0.6–4.47)
LYMPHOCYTES NFR BLD AUTO: 48 % (ref 14–44)
MAGNESIUM SERPL-MCNC: 2 MG/DL (ref 1.9–2.7)
MCH RBC QN AUTO: 30.7 PG (ref 26.8–34.3)
MCHC RBC AUTO-ENTMCNC: 33.7 G/DL (ref 31.4–37.4)
MCV RBC AUTO: 91 FL (ref 82–98)
MONOCYTES # BLD AUTO: 0.8 THOUSAND/ÂΜL (ref 0.17–1.22)
MONOCYTES NFR BLD AUTO: 9 % (ref 4–12)
NEUTROPHILS # BLD AUTO: 3.38 THOUSANDS/ÂΜL (ref 1.85–7.62)
NEUTS SEG NFR BLD AUTO: 37 % (ref 43–75)
NRBC BLD AUTO-RTO: 0 /100 WBCS
P AXIS: 44 DEGREES
PLATELET # BLD AUTO: 263 THOUSANDS/UL (ref 149–390)
PMV BLD AUTO: 10.1 FL (ref 8.9–12.7)
POTASSIUM SERPL-SCNC: 3.9 MMOL/L (ref 3.5–5.3)
PR INTERVAL: 174 MS
PROT SERPL-MCNC: 7.9 G/DL (ref 6.4–8.4)
PROTHROMBIN TIME: 13.7 SECONDS (ref 12.3–15)
QRS AXIS: -34 DEGREES
QRSD INTERVAL: 72 MS
QT INTERVAL: 384 MS
QTC INTERVAL: 448 MS
RBC # BLD AUTO: 4.37 MILLION/UL (ref 3.88–5.62)
SODIUM SERPL-SCNC: 134 MMOL/L (ref 135–147)
T WAVE AXIS: 62 DEGREES
VENTRICULAR RATE: 82 BPM
WBC # BLD AUTO: 9.01 THOUSAND/UL (ref 4.31–10.16)

## 2025-04-18 PROCEDURE — 82075 ASSAY OF BREATH ETHANOL: CPT | Performed by: EMERGENCY MEDICINE

## 2025-04-18 PROCEDURE — 96365 THER/PROPH/DIAG IV INF INIT: CPT

## 2025-04-18 PROCEDURE — 71045 X-RAY EXAM CHEST 1 VIEW: CPT

## 2025-04-18 PROCEDURE — 93010 ELECTROCARDIOGRAM REPORT: CPT | Performed by: INTERNAL MEDICINE

## 2025-04-18 PROCEDURE — 85025 COMPLETE CBC W/AUTO DIFF WBC: CPT | Performed by: EMERGENCY MEDICINE

## 2025-04-18 PROCEDURE — 80053 COMPREHEN METABOLIC PANEL: CPT | Performed by: EMERGENCY MEDICINE

## 2025-04-18 PROCEDURE — 72125 CT NECK SPINE W/O DYE: CPT

## 2025-04-18 PROCEDURE — 99285 EMERGENCY DEPT VISIT HI MDM: CPT | Performed by: EMERGENCY MEDICINE

## 2025-04-18 PROCEDURE — 85610 PROTHROMBIN TIME: CPT | Performed by: EMERGENCY MEDICINE

## 2025-04-18 PROCEDURE — 93005 ELECTROCARDIOGRAM TRACING: CPT

## 2025-04-18 PROCEDURE — 96366 THER/PROPH/DIAG IV INF ADDON: CPT

## 2025-04-18 PROCEDURE — 85730 THROMBOPLASTIN TIME PARTIAL: CPT | Performed by: EMERGENCY MEDICINE

## 2025-04-18 PROCEDURE — 70450 CT HEAD/BRAIN W/O DYE: CPT

## 2025-04-18 PROCEDURE — 83735 ASSAY OF MAGNESIUM: CPT | Performed by: EMERGENCY MEDICINE

## 2025-04-18 PROCEDURE — 36415 COLL VENOUS BLD VENIPUNCTURE: CPT | Performed by: EMERGENCY MEDICINE

## 2025-04-18 PROCEDURE — 99285 EMERGENCY DEPT VISIT HI MDM: CPT

## 2025-04-18 PROCEDURE — 82077 ASSAY SPEC XCP UR&BREATH IA: CPT | Performed by: EMERGENCY MEDICINE

## 2025-04-18 RX ADMIN — THIAMINE HYDROCHLORIDE: 100 INJECTION, SOLUTION INTRAMUSCULAR; INTRAVENOUS at 00:56

## 2025-04-18 RX ADMIN — SODIUM CHLORIDE 1000 ML: 0.9 INJECTION, SOLUTION INTRAVENOUS at 00:55

## 2025-04-18 NOTE — ED PROVIDER NOTES
Emergency Department Trauma Note  Marvin Christianson 69 y.o. male MRN: [id number]  Unit/Bed#: ED TR13/TR13B Encounter: [id number]      Trauma Alert:    Model of Arrival:   via    Trauma Team: Current Providers  Attending Provider: Lubna Bettencourt MD  Consultants:     None      History of Present Illness     Chief Complaint:   Chief Complaint   Patient presents with    Fall     Pt coming from [facility] via EMS. Pt fell today, denies any injury at this time. Pt currently intoxicated. Pt drinks Gin and Tonics daily.      HPI:  Marvin Christianson is a 69 y.o. male who presents with fall.  Mechanism:           69 year old male brought by EMS for evaluation after falling.  Patient has been drinking gin and tonic and fell onto his back today.  He denies head strike or LOC.  He states he has no current pain, but was told by his doctor that he should go to the hospital any time that he falls.        Fall    Review of Systems    Historical Information     Immunizations:   Immunization History   Administered Date(s) Administered    COVID-19 Moderna mRNA Vaccine 12 Yr+ 50 mcg/0.5 mL (Spikevax) 10/30/2023    COVID-19 PFIZER VACCINE 0.3 ML IM 03/10/2021, 03/31/2021    COVID-19 Pfizer vac (Milton-sucrose, gray cap) 12 yr+ IM 05/11/2022    Tdap 10/24/2022       Past Medical History:   Diagnosis Date    Alcohol abuse     Anxiety     Cancer (HCC)     kidney    Depression     GERD (gastroesophageal reflux disease)     Hypertension        Family History   Problem Relation Age of Onset    Colon cancer Neg Hx     Colon polyps Neg Hx      Past Surgical History:   Procedure Laterality Date    NEPHRECTOMY Left     UMBILICAL HERNIA REPAIR       Social History     Tobacco Use    Smoking status: Never    Smokeless tobacco: Never   Vaping Use    Vaping status: Never Used   Substance Use Topics    Alcohol use: Yes     Comment: a gallon of gin daily    Drug use: Never     E-Cigarette/Vaping    E-Cigarette Use Never User       E-Cigarette/Vaping Substances    Nicotine No     THC No     CBD No     Flavoring No     Other No     Unknown No        Family History: non-contributory    Meds/Allergies   Prior to Admission Medications   Prescriptions Last Dose Informant Patient Reported? Taking?   FLUoxetine (PROzac) 20 mg capsule   Yes No   Sig: Take 20 mg by mouth every morning   amLODIPine (NORVASC) 10 mg tablet  Self Yes No   docusate sodium (COLACE) 100 mg capsule  Self No No   Sig: Take 1 capsule (100 mg total) by mouth every 12 (twelve) hours   levETIRAcetam (KEPPRA) 500 mg tablet   Yes No   Sig: Take 500 mg by mouth 2 (two) times a day   lisinopril (ZESTRIL) 5 mg tablet   No No   Sig: Take 1 tablet (5 mg total) by mouth daily Do not start before October 27, 2022.   pantoprazole (PROTONIX) 40 mg tablet  Self Yes No      Facility-Administered Medications: None       No Known Allergies    PHYSICAL EXAM    PE limited by: none    Objective   Vitals:   First set: Temperature: 97.6 °F (36.4 °C) (04/18/25 0023)  Pulse: 86 (04/18/25 0019)  Respirations: 18 (04/18/25 0019)  Blood Pressure: 153/72 (04/18/25 0023)  SpO2: 95 % (04/18/25 0019)    Primary Survey:   (A) Airway: patent  (B) Breathing: bilateral breath sounds  (C) Circulation: Pulses:   normal  (D) Disabliity:  GCS Total:  15  (E) Expose:  Completed    Secondary Survey: (Click on Physical Exam tab above)  Physical Exam  Vitals and nursing note reviewed.   HENT:      Head: Normocephalic and atraumatic.   Cardiovascular:      Rate and Rhythm: Normal rate and regular rhythm.      Pulses: Normal pulses.      Heart sounds: Normal heart sounds.   Pulmonary:      Effort: Pulmonary effort is normal. No respiratory distress.      Breath sounds: Normal breath sounds.   Abdominal:      General: There is no distension.      Palpations: Abdomen is soft.      Tenderness: There is no abdominal tenderness.   Musculoskeletal:         General: No deformity.      Comments: No midline C/T/L spine  tenderness. No step offs or deformities.   No pelvic tenderness on instability  Full ROM throughout   Skin:     General: Skin is warm and dry.   Neurological:      Mental Status: He is alert.      Comments: intoxicated         Cervical spine cleared by clinical criteria? No (imaging required)      Invasive Devices       None                   Lab Results:   Results Reviewed       Procedure Component Value Units Date/Time    Comprehensive metabolic panel [[id number]]  (Abnormal) Collected: 04/18/25 0028    Lab Status: Final result Specimen: Blood from Arm, Right Updated: 04/18/25 0048     Sodium 134 mmol/L      Potassium 3.9 mmol/L      Chloride 102 mmol/L      CO2 20 mmol/L      ANION GAP 12 mmol/L      BUN 21 mg/dL      Creatinine 1.26 mg/dL      Glucose 102 mg/dL      Calcium 9.8 mg/dL      AST 19 U/L      ALT 12 U/L      Alkaline Phosphatase 64 U/L      Total Protein 7.9 g/dL      Albumin 4.6 g/dL      Total Bilirubin 0.89 mg/dL      eGFR 58 ml/min/1.73sq m     Narrative:      National Kidney Disease Foundation guidelines for Chronic Kidney Disease (CKD):     Stage 1 with normal or high GFR (GFR > 90 mL/min/1.73 square meters)    Stage 2 Mild CKD (GFR = 60-89 mL/min/1.73 square meters)    Stage 3A Moderate CKD (GFR = 45-59 mL/min/1.73 square meters)    Stage 3B Moderate CKD (GFR = 30-44 mL/min/1.73 square meters)    Stage 4 Severe CKD (GFR = 15-29 mL/min/1.73 square meters)    Stage 5 End Stage CKD (GFR <15 mL/min/1.73 square meters)  Note: GFR calculation is accurate only with a steady state creatinine    Magnesium [[id number]]  (Normal) Collected: 04/18/25 0028    Lab Status: Final result Specimen: Blood from Arm, Right Updated: 04/18/25 0048     Magnesium 2.0 mg/dL     Ethanol [[id number]]  (Abnormal) Collected: 04/18/25 0028    Lab Status: Final result Specimen: Blood from Arm, Right Updated: 04/18/25 0048     Ethanol Lvl 316 mg/dL     Protime-INR [[id number]]  (Normal) Collected: 04/18/25 0028    Lab Status:  Final result Specimen: Blood from Arm, Right Updated: 04/18/25 0047     Protime 13.7 seconds      INR 1.00    Narrative:      INR Therapeutic Range    Indication                                             INR Range      Atrial Fibrillation                                               2.0-3.0  Hypercoagulable State                                    2.0.2.3  Left Ventricular Asist Device                            2.0-3.0  Mechanical Heart Valve                                  -    Aortic(with afib, MI, embolism, HF, LA enlargement,    and/or coagulopathy)                                     2.0-3.0 (2.5-3.5)     Mitral                                                             2.5-3.5  Prosthetic/Bioprosthetic Heart Valve               2.0-3.0  Venous thromboembolism (VTE: VT, PE        2.0-3.0    APTT [[id number]]  (Normal) Collected: 04/18/25 0028    Lab Status: Final result Specimen: Blood from Arm, Right Updated: 04/18/25 0047     PTT 29 seconds     CBC and differential [[id number]]  (Abnormal) Collected: 04/18/25 0028    Lab Status: Final result Specimen: Blood from Arm, Right Updated: 04/18/25 0034     WBC 9.01 Thousand/uL      RBC 4.37 Million/uL      Hemoglobin 13.4 g/dL      Hematocrit 39.8 %      MCV 91 fL      MCH 30.7 pg      MCHC 33.7 g/dL      RDW 15.3 %      MPV 10.1 fL      Platelets 263 Thousands/uL      nRBC 0 /100 WBCs      Segmented % 37 %      Immature Grans % 0 %      Lymphocytes % 48 %      Monocytes % 9 %      Eosinophils Relative 5 %      Basophils Relative 1 %      Absolute Neutrophils 3.38 Thousands/µL      Absolute Immature Grans 0.02 Thousand/uL      Absolute Lymphocytes 4.34 Thousands/µL      Absolute Monocytes 0.80 Thousand/µL      Eosinophils Absolute 0.41 Thousand/µL      Basophils Absolute 0.06 Thousands/µL                    Imaging Studies:   Direct to CT: No  XR Trauma chest portable   ED Interpretation by Lubna Bettencourt MD (04/18 0043)   No acute pulmonary  pathology.  No displaced rib fracture.  No hemo/pneumothorax      TRAUMA - CT head wo contrast    (Results Pending)   TRAUMA - CT spine cervical wo contrast    (Results Pending)         Procedures  ECG 12 Lead Documentation Only    Date/Time: 4/18/2025 12:51 AM    Performed by: Lubna Bettencourt MD  Authorized by: Lubna Bettencourt MD    Indications / Diagnosis:  Fall  ECG reviewed by me, the ED Provider: yes    Patient location:  ED  Previous ECG:     Previous ECG:  Compared to current    Comparison ECG info:  3/25/25 sinus rhythm with left axis and poor rwave progression    Similarity:  Changes noted  Interpretation:     Interpretation: normal    Rate:     ECG rate:  82    ECG rate assessment: normal    Rhythm:     Rhythm: sinus rhythm    Ectopy:     Ectopy: none    QRS:     QRS axis:  Normal    QRS intervals:  Normal  Conduction:     Conduction: normal    ST segments:     ST segments:  Normal  T waves:     T waves: flattening      Flattening:  AVL and V2           ED Course           Medical Decision Making  69 year old male presents for evaluation after falling onto his back while intoxicated.  Patient has no current complaints.      Amount and/or Complexity of Data Reviewed  Labs: ordered.  Radiology: ordered and independent interpretation performed.                Disposition  [facility] Transfer: No  Final diagnoses:   None     ED Disposition       None          Follow-up Information    None       Patient's Medications   Discharge Prescriptions    No medications on file     No discharge procedures on file.    PDMP Review         Value Time User    PDMP Reviewed  Yes 10/25/2022  4:29 PM Brii Aguayo PA-C            ED Provider  Electronically Signed by         Making  69 year old male presents for evaluation after falling onto his back while intoxicated.  Patient has no current complaints.  No injuries identified.  Vague suicidal ideation while intoxicated.  Patient monitored until sobriety.  No criteria for involuntary psychiatric admission.    Amount and/or Complexity of Data Reviewed  Labs: ordered.  Radiology: ordered and independent interpretation performed.                Disposition  [facility] Transfer: No  Final diagnoses:   Fall, initial encounter   Alcohol intoxication (HCC)     Time reflects when diagnosis was documented in both MDM as applicable and the Disposition within this note       Time User Action Codes Description Comment    4/18/2025 11:04 AM Nando Lambert [W19.XXXA] Fall, initial encounter     4/18/2025 11:05 AM Nando Lambert [F10.929] Alcohol intoxication (HCC)           ED Disposition       ED Disposition   Discharge    Condition   Stable    Date/Time   Fri Apr 18, 2025 11:04 AM    Comment   Marvin Christianson discharge to home/self care.                   Follow-up Information       Follow up With Specialties Details Why Contact Info Additional Information    Gigi Herndon MD Internal Medicine   [address] [place] [address].  [address]  [facility] [address]  [phone]        [facility] Emergency Department Emergency Medicine  If symptoms worsen [address] [facility] [address] [phone] [facility] Emergency Department, [address], [place] [address]          Discharge Medication List as of 4/18/2025 11:05 AM        CONTINUE these medications which have NOT CHANGED    Details   amLODIPine (NORVASC) 10 mg tablet Historical Med      docusate sodium (COLACE) 100 mg capsule Take 1 capsule (100 mg total) by mouth every 12 (twelve) hours, Starting Fri 10/29/2021, Normal      FLUoxetine (PROzac) 20 mg capsule Take 20 mg by mouth every morning, Starting Wed  6/15/2022, Historical Med      levETIRAcetam (KEPPRA) 500 mg tablet Take 500 mg by mouth 2 (two) times a day, Starting Mon 7/11/2022, Historical Med      lisinopril (ZESTRIL) 5 mg tablet Take 1 tablet (5 mg total) by mouth daily Do not start before October 27, 2022., Starting Thu 10/27/2022, Normal      pantoprazole (PROTONIX) 40 mg tablet Historical Med           No discharge procedures on file.    PDMP Review         Value Time User    PDMP Reviewed  Yes 10/25/2022  4:29 PM Brii Aguayo PA-C            ED Provider  Electronically Signed by           Lubna Bettencourt MD  04/21/25 7243

## 2025-04-21 ENCOUNTER — HOSPITAL ENCOUNTER (EMERGENCY)
Facility: HOSPITAL | Age: 70
Discharge: HOME | End: 2025-04-22
Attending: EMERGENCY MEDICINE
Payer: COMMERCIAL

## 2025-04-21 DIAGNOSIS — F10.920 ALCOHOLIC INTOXICATION WITHOUT COMPLICATION (HCC): Primary | ICD-10-CM

## 2025-04-21 LAB
ALBUMIN SERPL BCG-MCNC: 4.3 G/DL (ref 3.5–5)
ALP SERPL-CCNC: 50 U/L (ref 34–104)
ALT SERPL W P-5'-P-CCNC: 12 U/L (ref 7–52)
ANION GAP SERPL CALCULATED.3IONS-SCNC: 11 MMOL/L (ref 4–13)
AST SERPL W P-5'-P-CCNC: 17 U/L (ref 13–39)
BASOPHILS # BLD AUTO: 0.03 THOUSANDS/ÂΜL (ref 0–0.1)
BASOPHILS NFR BLD AUTO: 1 % (ref 0–1)
BILIRUB SERPL-MCNC: 0.44 MG/DL (ref 0.2–1)
BUN SERPL-MCNC: 19 MG/DL (ref 5–25)
CALCIUM SERPL-MCNC: 9.1 MG/DL (ref 8.4–10.2)
CHLORIDE SERPL-SCNC: 108 MMOL/L (ref 96–108)
CO2 SERPL-SCNC: 24 MMOL/L (ref 21–32)
CREAT SERPL-MCNC: 1.16 MG/DL (ref 0.6–1.3)
EOSINOPHIL # BLD AUTO: 0.28 THOUSAND/ÂΜL (ref 0–0.61)
EOSINOPHIL NFR BLD AUTO: 5 % (ref 0–6)
ERYTHROCYTE [DISTWIDTH] IN BLOOD BY AUTOMATED COUNT: 15.1 % (ref 11.6–15.1)
GFR SERPL CREATININE-BSD FRML MDRD: 64 ML/MIN/1.73SQ M
GLUCOSE SERPL-MCNC: 112 MG/DL (ref 65–140)
HCT VFR BLD AUTO: 38.4 % (ref 36.5–49.3)
HGB BLD-MCNC: 12.8 G/DL (ref 12–17)
IMM GRANULOCYTES # BLD AUTO: 0.02 THOUSAND/UL (ref 0–0.2)
IMM GRANULOCYTES NFR BLD AUTO: 0 % (ref 0–2)
LYMPHOCYTES # BLD AUTO: 2.43 THOUSANDS/ÂΜL (ref 0.6–4.47)
LYMPHOCYTES NFR BLD AUTO: 42 % (ref 14–44)
MCH RBC QN AUTO: 30.8 PG (ref 26.8–34.3)
MCHC RBC AUTO-ENTMCNC: 33.3 G/DL (ref 31.4–37.4)
MCV RBC AUTO: 92 FL (ref 82–98)
MONOCYTES # BLD AUTO: 0.51 THOUSAND/ÂΜL (ref 0.17–1.22)
MONOCYTES NFR BLD AUTO: 9 % (ref 4–12)
NEUTROPHILS # BLD AUTO: 2.59 THOUSANDS/ÂΜL (ref 1.85–7.62)
NEUTS SEG NFR BLD AUTO: 43 % (ref 43–75)
NRBC BLD AUTO-RTO: 0 /100 WBCS
PLATELET # BLD AUTO: 277 THOUSANDS/UL (ref 149–390)
PMV BLD AUTO: 9.8 FL (ref 8.9–12.7)
POTASSIUM SERPL-SCNC: 3.8 MMOL/L (ref 3.5–5.3)
PROT SERPL-MCNC: 7.5 G/DL (ref 6.4–8.4)
RBC # BLD AUTO: 4.16 MILLION/UL (ref 3.88–5.62)
SODIUM SERPL-SCNC: 143 MMOL/L (ref 135–147)
WBC # BLD AUTO: 5.86 THOUSAND/UL (ref 4.31–10.16)

## 2025-04-21 PROCEDURE — 85025 COMPLETE CBC W/AUTO DIFF WBC: CPT | Performed by: EMERGENCY MEDICINE

## 2025-04-21 PROCEDURE — 99284 EMERGENCY DEPT VISIT MOD MDM: CPT | Performed by: EMERGENCY MEDICINE

## 2025-04-21 PROCEDURE — 80053 COMPREHEN METABOLIC PANEL: CPT | Performed by: EMERGENCY MEDICINE

## 2025-04-21 PROCEDURE — 99284 EMERGENCY DEPT VISIT MOD MDM: CPT

## 2025-04-21 PROCEDURE — 36415 COLL VENOUS BLD VENIPUNCTURE: CPT | Performed by: EMERGENCY MEDICINE

## 2025-04-22 VITALS
TEMPERATURE: 98.1 F | RESPIRATION RATE: 16 BRPM | OXYGEN SATURATION: 92 % | DIASTOLIC BLOOD PRESSURE: 81 MMHG | HEART RATE: 84 BPM | SYSTOLIC BLOOD PRESSURE: 168 MMHG

## 2025-04-22 NOTE — ED NOTES
Patient sleeping, 87% on room air, 1.5L NC applied. Provider notified.     SPO2 at 94% on 1.5L of NC.     Chencho Valencia RN  04/21/25 9340       Chencho Valencia RN  04/22/25 0012

## 2025-04-22 NOTE — ED PROVIDER NOTES
Time reflects when diagnosis was documented in both MDM as applicable and the Disposition within this note       Time User Action Codes Description Comment    4/22/2025  2:41 AM Dewitt Sukh Add [F10.920] Alcoholic intoxication without complication (HCC)           ED Disposition       ED Disposition   Discharge    Condition   Stable    Date/Time   Tue Apr 22, 2025  2:41 AM    Comment   Marvin Christianson discharge to home/self care.                   Assessment & Plan       Medical Decision Making    69 y.o. male presenting for alcohol intoxication.  GCS 15, vital stable.  He denies any complaints.  He denies any fall or traumatic injury.  He is currently intoxication, no evidence of alcohol withdrawal this time.  Will obtain labs to evaluate for anemia, electrolyte abnormality or MELODY.  Patient was offered assistance with alcohol detox/rehab however declines at this time.  Will observe until clinically sober.    Reassessment: Vital stable during ED course, well-appearing and ambulatory with steady gait.  Will arrange for transport home.    Disposition: I have discussed with the patient our plan to discharge them from the ED and the patient is in agreement with this plan.     Discharge Plan: Provided with resources for alcohol detox/rehab. RTED precautions emphasized. The patient was provided a written after visit summary with strict RTED precautions.     Followup: I have discussed with the patient plan to follow up with their PCP. Contact information provided in AVS.    Amount and/or Complexity of Data Reviewed  Labs: ordered.        ED Course as of 04/22/25 0300   Tue Apr 22, 2025   0241 Ambulatory with steady gait.       Medications - No data to display    ED Risk Strat Scores                    No data recorded                            History of Present Illness       Chief Complaint   Patient presents with    Alcohol Intoxication     Pt drinking gin and tonic all day and states he was stumbling into the  "walls and decided to call 911 because the hospital is safer than his home.        Past Medical History:   Diagnosis Date    Alcohol abuse     Anxiety     Cancer (HCC)     kidney    Depression     GERD (gastroesophageal reflux disease)     Hypertension       Past Surgical History:   Procedure Laterality Date    NEPHRECTOMY Left     UMBILICAL HERNIA REPAIR        Family History   Problem Relation Age of Onset    Colon cancer Neg Hx     Colon polyps Neg Hx       Social History     Tobacco Use    Smoking status: Never    Smokeless tobacco: Never   Vaping Use    Vaping status: Never Used   Substance Use Topics    Alcohol use: Yes     Comment: a gallon of gin daily    Drug use: Never      E-Cigarette/Vaping    E-Cigarette Use Never User       E-Cigarette/Vaping Substances    Nicotine No     THC No     CBD No     Flavoring No     Other No     Unknown No       I have reviewed and agree with the history as documented.     Marvin Christianson is a 69 y.o. year old male with PMH of alcohol dependence presenting to the [facility] ED for evaluation of alcohol intoxication. Patient states he had numerous gin and tonic throughout the day today. He was upset that the [place] . This evening he felt unsteady on his feet and walked to his neighbor's house and asked that she call an ambulance. Patient states \"I was afraid I was going to fall so I figured it was better to come to the emergency department\". Patient denies any fall or traumatic injury. He denies chest pain, dyspnea or abdominal pain. He states he drinks alcohol daily however drink more today after the death of the Ruiz. He denies suicidal ideation.  His last drink was just before calling ambulance. Patient states he has been to rehab for alcohol dependence many times however is not interested in seeking rehab/detox placement at this time.      History provided by:  Medical records, patient and EMS personnel   used: No    Alcohol Intoxication  Associated " symptoms: no abdominal pain, no headaches, no nausea, no shortness of breath, no vomiting and no weakness        Review of Systems   Respiratory:  Negative for shortness of breath.    Cardiovascular:  Negative for chest pain.   Gastrointestinal:  Negative for abdominal pain, nausea and vomiting.   Genitourinary:  Negative for flank pain.   Musculoskeletal:  Negative for back pain and neck pain.   Neurological:  Negative for weakness, numbness and headaches.   Psychiatric/Behavioral:  Negative for sleep disturbance.    All other systems reviewed and are negative.          Objective       ED Triage Vitals [04/21/25 2249]   Temperature Pulse Blood Pressure Respirations SpO2 Patient Position - Orthostatic VS   98.1 °F (36.7 °C) 100 164/85 18 98 % Lying      Temp Source Heart Rate Source BP Location FiO2 (%) Pain Score    Oral Monitor Right arm -- --      Vitals      Date and Time Temp Pulse SpO2 Resp BP Pain Score FACES Pain Rating User   04/22/25 0230 -- 86 96 % 16 151/81 -- --    04/22/25 0145 -- 87 96 % 16 152/79 -- --    04/22/25 0100 -- 89 95 % 16 147/75 -- --    04/22/25 0030 -- 87 94 % 16 135/71 -- --    04/21/25 2300 -- 104 93 % 18 175/80 -- --    04/21/25 2249 98.1 °F (36.7 °C) 100 98 % 18 164/85 -- -- KM            Physical Exam  Vitals and nursing note reviewed.   Constitutional:       General: He is not in acute distress.     Appearance: Normal appearance. He is well-developed. He is not ill-appearing, toxic-appearing or diaphoretic.   HENT:      Head: Normocephalic and atraumatic.      Nose: No congestion or rhinorrhea.   Eyes:      General:         Right eye: No discharge.         Left eye: No discharge.   Cardiovascular:      Rate and Rhythm: Normal rate and regular rhythm.   Pulmonary:      Effort: Pulmonary effort is normal. No respiratory distress.      Breath sounds: Normal breath sounds. No wheezing or rales.   Abdominal:      Palpations: Abdomen is soft.      Tenderness: There is no  abdominal tenderness. There is no guarding or rebound.   Skin:     General: Skin is warm.      Capillary Refill: Capillary refill takes less than 2 seconds.      Coloration: Skin is not jaundiced.   Neurological:      Mental Status: He is alert and oriented to person, place, and time.      GCS: GCS eye subscore is 4. GCS verbal subscore is 5. GCS motor subscore is 6.      Cranial Nerves: No dysarthria or facial asymmetry.      Sensory: Sensation is intact.      Motor: Motor function is intact.      Gait: Gait normal.      Comments: Slow, delayed speech.  Appears intoxicated.  5/5 strength b/l UE/LE.  Sensation grossly intact throughout.     Psychiatric:         Mood and Affect: Mood normal.         Behavior: Behavior normal.         Results Reviewed       Procedure Component Value Units Date/Time    Comprehensive metabolic panel [[id number]] Collected: 04/21/25 8855    Lab Status: Final result Specimen: Blood from Arm, Left Updated: 04/21/25 8662     Sodium 143 mmol/L      Potassium 3.8 mmol/L      Chloride 108 mmol/L      CO2 24 mmol/L      ANION GAP 11 mmol/L      BUN 19 mg/dL      Creatinine 1.16 mg/dL      Glucose 112 mg/dL      Calcium 9.1 mg/dL      AST 17 U/L      ALT 12 U/L      Alkaline Phosphatase 50 U/L      Total Protein 7.5 g/dL      Albumin 4.3 g/dL      Total Bilirubin 0.44 mg/dL      eGFR 64 ml/min/1.73sq m     Narrative:      National Kidney Disease Foundation guidelines for Chronic Kidney Disease (CKD):     Stage 1 with normal or high GFR (GFR > 90 mL/min/1.73 square meters)    Stage 2 Mild CKD (GFR = 60-89 mL/min/1.73 square meters)    Stage 3A Moderate CKD (GFR = 45-59 mL/min/1.73 square meters)    Stage 3B Moderate CKD (GFR = 30-44 mL/min/1.73 square meters)    Stage 4 Severe CKD (GFR = 15-29 mL/min/1.73 square meters)    Stage 5 End Stage CKD (GFR <15 mL/min/1.73 square meters)  Note: GFR calculation is accurate only with a steady state creatinine    CBC and differential [[id number]] Collected:  04/21/25 2314    Lab Status: Final result Specimen: Blood from Arm, Left Updated: 04/21/25 2322     WBC 5.86 Thousand/uL      RBC 4.16 Million/uL      Hemoglobin 12.8 g/dL      Hematocrit 38.4 %      MCV 92 fL      MCH 30.8 pg      MCHC 33.3 g/dL      RDW 15.1 %      MPV 9.8 fL      Platelets 277 Thousands/uL      nRBC 0 /100 WBCs      Segmented % 43 %      Immature Grans % 0 %      Lymphocytes % 42 %      Monocytes % 9 %      Eosinophils Relative 5 %      Basophils Relative 1 %      Absolute Neutrophils 2.59 Thousands/µL      Absolute Immature Grans 0.02 Thousand/uL      Absolute Lymphocytes 2.43 Thousands/µL      Absolute Monocytes 0.51 Thousand/µL      Eosinophils Absolute 0.28 Thousand/µL      Basophils Absolute 0.03 Thousands/µL             No orders to display       Procedures    ED Medication and Procedure Management   Prior to Admission Medications   Prescriptions Last Dose Informant Patient Reported? Taking?   FLUoxetine (PROzac) 20 mg capsule   Yes No   Sig: Take 20 mg by mouth every morning   amLODIPine (NORVASC) 10 mg tablet  Self Yes No   docusate sodium (COLACE) 100 mg capsule  Self No No   Sig: Take 1 capsule (100 mg total) by mouth every 12 (twelve) hours   levETIRAcetam (KEPPRA) 500 mg tablet   Yes No   Sig: Take 500 mg by mouth 2 (two) times a day   lisinopril (ZESTRIL) 5 mg tablet   No No   Sig: Take 1 tablet (5 mg total) by mouth daily Do not start before October 27, 2022.   pantoprazole (PROTONIX) 40 mg tablet  Self Yes No      Facility-Administered Medications: None     Patient's Medications   Discharge Prescriptions    No medications on file     No discharge procedures on file.  ED SEPSIS DOCUMENTATION   Time reflects when diagnosis was documented in both MDM as applicable and the Disposition within this note       Time User Action Codes Description Comment    4/22/2025  2:41 AM Sukh Dewitt Add [F10.920] Alcoholic intoxication without complication (HCC)                  Sukh Dewitt  DO  04/22/25 0309

## 2025-04-22 NOTE — ED NOTES
Patient ambulated 30 feet with steady gait.      Chencho Valencia RN  04/22/25 0245       Chencho Valencia RN  04/22/25 0248

## 2025-04-22 NOTE — DISCHARGE INSTRUCTIONS
A list of [facility] [place] resources are included below:    Housing Assistance  [facility] Housing Link 1-412.752.5164    Mental Health Crisis  [facility] ([facility]):  [phone]  Suicide Prevention Lifeline:  Call, Text or Chat - 988 or call 2-908-975-TALK (4745)  [employer]:  7-371-862-9210  [facility] Crisis Intervention Services: 1-489.117.9527  ’s Crisis Line:  1-644.555.7636 press 1    Drug and Alcohol Services  [place] Drug and Alcohol Helpline:  0-199-227-HELP (4428)  Alcoholics Anonymous:  1-105.350.6785  [place] Crisis Line:  1-436.903.4961 press 1    Victim Services  [place] Domestic Abuse Hotline:  4-928-697-SAFE (1338)  [facility] ([facility]):  1-773.736.6126  Elder Abuse Hotline ([facility] on Aging):  1-684.307.3045    A full list of services can be found at: [email or web address]

## 2025-08-01 ENCOUNTER — TELEPHONE (OUTPATIENT)
Age: 70
End: 2025-08-01

## 2025-08-05 ENCOUNTER — HOSPITAL ENCOUNTER (EMERGENCY)
Facility: HOSPITAL | Age: 70
Discharge: HOME | End: 2025-08-05
Attending: EMERGENCY MEDICINE
Payer: COMMERCIAL

## 2025-08-10 PROBLEM — R26.2 AMBULATORY DYSFUNCTION: Status: RESOLVED | Noted: 2022-10-24 | Resolved: 2025-08-10

## 2025-08-10 PROBLEM — G40.909 SEIZURE DISORDER (HCC): Status: ACTIVE | Noted: 2025-08-10

## 2025-08-10 PROBLEM — F33.1 MDD (MAJOR DEPRESSIVE DISORDER), RECURRENT EPISODE, MODERATE (HCC): Status: ACTIVE | Noted: 2025-08-10

## 2025-08-10 PROBLEM — N52.9 ED (ERECTILE DYSFUNCTION): Status: ACTIVE | Noted: 2025-08-10

## 2025-08-10 PROBLEM — H91.10 PRESBYCUSIS: Status: ACTIVE | Noted: 2025-08-10
